# Patient Record
Sex: FEMALE | Race: WHITE | Employment: OTHER | URBAN - METROPOLITAN AREA
[De-identification: names, ages, dates, MRNs, and addresses within clinical notes are randomized per-mention and may not be internally consistent; named-entity substitution may affect disease eponyms.]

---

## 2017-06-19 PROBLEM — Z79.01 ON CONTINUOUS ORAL ANTICOAGULATION: Chronic | Status: ACTIVE | Noted: 2017-06-19

## 2017-06-19 PROBLEM — E55.9 HYPOVITAMINOSIS D: Status: ACTIVE | Noted: 2017-06-19

## 2017-06-19 PROBLEM — H81.10 BPPV (BENIGN PAROXYSMAL POSITIONAL VERTIGO): Chronic | Status: ACTIVE | Noted: 2017-06-19

## 2017-09-30 PROBLEM — J45.40 MODERATE PERSISTENT ASTHMA WITHOUT COMPLICATION: Chronic | Status: ACTIVE | Noted: 2017-09-30

## 2017-09-30 PROBLEM — Z91.09 ENVIRONMENTAL ALLERGIES: Chronic | Status: ACTIVE | Noted: 2017-09-30

## 2017-10-03 ENCOUNTER — HOSPITAL ENCOUNTER (OUTPATIENT)
Dept: MRI IMAGING | Age: 72
Discharge: HOME | End: 2017-10-03
Attending: PSYCHIATRY & NEUROLOGY

## 2017-10-03 DIAGNOSIS — R51.9 NONINTRACTABLE HEADACHE, UNSPECIFIED CHRONICITY PATTERN, UNSPECIFIED HEADACHE TYPE: ICD-10-CM

## 2017-10-03 NOTE — PROGRESS NOTES
Patient tried MRi. Unable to complete exam due to claustro. Patient has medication but could not take it because she did not have a .   She will reschedule for when she is able to take the medication

## 2017-12-09 PROBLEM — E66.9 OBESITY (BMI 30.0-34.9): Chronic | Status: ACTIVE | Noted: 2017-12-09

## 2017-12-26 ENCOUNTER — TELEPHONE (OUTPATIENT)
Dept: NUTRITION | Age: 72
End: 2017-12-26

## 2017-12-26 NOTE — TELEPHONE ENCOUNTER
Nutrition Counseling: Called pt and left voicemail with contact information regarding [facility]'s referral for nutrition counseling.     Jeovany Monet MS, [address] [place] [address], [address], [place]  W: [phone]  C: [phone]

## 2017-12-27 ENCOUNTER — TELEPHONE (OUTPATIENT)
Dept: NUTRITION | Age: 72
End: 2017-12-27

## 2017-12-27 NOTE — TELEPHONE ENCOUNTER
Nutrition Counseling: Pt referred by practitioner, Tolu Rivers. Spoke with pt regarding referral for nutrition counseling. Pt is interested and is advised that an insurance pre-cert request will be sent to registration on January 1st. Benefits information will be determined in 7-10 days after which pt will be contacted to attempt to schedule an appt.     Ridge Kearney MS, [address] [place] [address], [address] [place] [address] [place], [place]  W: [phone]  C: [phone]

## 2017-12-29 PROBLEM — F33.9 RECURRENT DEPRESSION (HCC): Status: ACTIVE | Noted: 2017-12-29

## 2018-01-09 ENCOUNTER — TELEPHONE (OUTPATIENT)
Dept: NUTRITION | Age: 73
End: 2018-01-09

## 2018-01-09 NOTE — TELEPHONE ENCOUNTER
Nutrition Counseling:  Pt referred by Dr. Luiz Mac's office. Spoke with pt regarding non-coverage by insurance. Gave self-pay rates and free group education class information. Pt states that she has recently lost her  and finds that she is coping some with the grief by eating more. Advised pt of the grief management class at the end of the month. Mailed class information to pt. Advised pt to call with questions and to f/u with MD regarding plans for any therapeutic counseling that may be needed.     Edouard Soria MS, [address] [place] [address], [address], [place]  W: [phone]  C: [phone]

## 2018-01-20 ENCOUNTER — HOSPITAL ENCOUNTER (OUTPATIENT)
Dept: MAMMOGRAPHY | Age: 73
Discharge: HOME | End: 2018-01-20
Attending: INTERNAL MEDICINE
Payer: COMMERCIAL

## 2018-01-20 DIAGNOSIS — Z12.31 VISIT FOR SCREENING MAMMOGRAM: ICD-10-CM

## 2018-01-20 PROCEDURE — 77067 SCR MAMMO BI INCL CAD: CPT

## 2018-07-06 ENCOUNTER — HOSPITAL ENCOUNTER (OUTPATIENT)
Dept: PHYSICAL THERAPY | Age: 73
Discharge: HOME | End: 2018-07-06
Payer: COMMERCIAL

## 2018-07-06 PROCEDURE — G8978 MOBILITY CURRENT STATUS: HCPCS

## 2018-07-06 PROCEDURE — G8979 MOBILITY GOAL STATUS: HCPCS

## 2018-07-06 PROCEDURE — 97110 THERAPEUTIC EXERCISES: CPT

## 2018-07-06 PROCEDURE — 97162 PT EVAL MOD COMPLEX 30 MIN: CPT

## 2018-07-06 NOTE — THERAPY EVALUATION
Mary Cordova  : 1945  Primary: Robin Linaresricardodaniela Of Venu Cardenas*  [place] [facility], [address], [facility], [address] [facility]  [phone]   [phone]         OUTPATIENT PHYSICAL THERAPY:Initial Assessment 2018    ICD-10: Treatment Diagnosis: Pain in right knee (M25.561); Pain in left knee (M25.562); Difficulty in walking, not elsewhere classified (R26.2)  Precautions/Allergies:   Betamethasone; Hydrochlorothiazide; Albuterol; Allegra-d 12 hour [fexofenadine-pseudoephedrine]; Bactrim [sulfamethoprim ds]; Ciprofloxacin; Claritin-d 12 hour [loratadine-pseudoephedrine]; Cortisone; Cymbalta [duloxetine]; Effexor [venlafaxine]; Lexapro [escitalopram]; Pristiq [desvenlafaxine]; Rozerem [ramelteon]; Zoloft [sertraline]; and Zyrtec-d [cetirizine-pseudoephedrine]   Fall Risk Score: 0 (? 5 = High Risk)  MD Orders: PT eval and treat MEDICAL/REFERRING DIAGNOSIS:  Unilateral primary osteoarthritis, right knee [M17.11]  Unilateral primary osteoarthritis, left knee [M17.12]   DATE OF ONSET: Progressive over the last several months   REFERRING PHYSICIAN: Jackeline Gerard MD  RETURN PHYSICIAN APPOINTMENT: As needed     ASSESSMENT (Date: 18):  Ms. Zell Meigs presents with B knee pain R>L due to progressive osteoarthritis. She has decreased B LE strength, decreased balance, reduced ambulation tolerance, and significant pain with mobility. According to her LEFS, she perceives great impairment to her quality of life. She requires skilled PT of strength/endurance training, balance training, and functional mobility training in order to address the goals and problems listed below. Discussed the option and benefits of aquatic therapy with the patient. She expressed interest in the idea, but stated that she was too busy with work at the moment. Will try and add to treatment plan as patient's work schedule frees up.  Patient is only able to attend therapy once a week due to having a $30 co-pay. PROBLEM LIST (Impacting functional limitations):  1. Decreased Strength  2. Decreased ADL/Functional Activities  3. Decreased Transfer Abilities  4. Decreased Ambulation Ability/Technique  5. Decreased Balance  6. Increased Pain  7. Decreased Activity Tolerance  8. Decreased [place] with Home Exercise Program INTERVENTIONS PLANNED:  1. Balance Exercise  2. Gait Training  3. Home Exercise Program (HEP)  4. Manual Therapy  5. Therapeutic Activites  6. Therapeutic Exercise/Strengthening  7. Aquatic Therapy   TREATMENT PLAN:  Effective Dates: 7/6/2018 TO 9/5/2018 (60 days). Frequency/Duration: 1 time a week for 60 Days  GOALS: (Goals have been discussed and agreed upon with patient.)  Discharge Goals: Time Frame: 60 days  1. Patient will be independent with HEP. 2. Patient will have an increase on the LEFS to 42/80 in order to demonstrate improved quality of life and reduced pain with activity. 3. Patient will demonstrate sit to stand transfer on first attempt without use of hands with <2/10 pain in B knees in order to demonstrate improved functional independence. Rehabilitation Potential For Stated Goals: Good  Regarding Daisy Chung's therapy, I certify that the treatment plan above will be carried out by a therapist or under their direction. Thank you for this referral,  PRASANTH Brown  Referring Physician Signature: Steven Watson MD              Date                    HISTORY:   Patient Stated Goal:        Patient states she wants to be able to get back to doing the things she was able to do before such as active walking for exercise. History of Present Injury/Illness (Reason for Referral):  Patient presents with B knee osteoarthritis. She reports that her L knee is \"bone on bone\", but she has had 5 rounds of gel shots in the L which has improved her pain. Her last shot was 2 weeks ago.  Her R knee pain is now worse than her L because she thinks that she has been compensating with it for so long. The plan is to eventually have a L TKA, but she would like to wait until next February because she is thinking about retiring from work at that point. Past Medical History/Comorbidities:   Ms. 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 (8/16/2011); Anemia (10/13/2010); Anticoagulant long-term use (6/8/2016); Anxiety and depression (6/23/2009); Atrial fibrillation ([facility] [address] Atrophic vaginitis (5/2/2012); B12 deficiency (11/17/2010); Blood glucose elevated (2/3/2009); Chest pain; Dysfunctional uterine bleeding (5/2/2012); Generalized anxiety disorder (10/21/2016); GERD (gastroesophageal reflux disease) (11/8/2012); High risk medication use (10/21/2016); HLD (hyperlipidemia) (2/3/2009); HTN (hypertension); Hypothyroidism (10/12/2009); IBS (irritable bowel syndrome) (7/5/2011); Insomnia (3/17/2010); Leukocytosis (1/15/2014); LVH (left ventricular hypertrophy) (1/13/2009); Microscopic hematuria (4/16/2014); Obesity; Osteopenia (1/15/2014); Pernicious anemia (1/15/2014); Pneumonia (12/30/2009); Positive occult stool blood test (10/13/2010); Skin lesion (8/16/2011); Thrombocytopenia ([facility] [address] (2/28/2013); Unspecified sleep apnea; Urinary frequency (3/28/2013); and Vertigo (12/9/2010). Ms. Getachew Givens  has a past surgical history that includes pr cardiac surg procedure unlist; hx colonoscopy (2010); us guided core breast biopsy (Right, 1966); and hx breast biopsy (1972). Social History/Living Environment:    Patient lives in a house with her . Prior Level of Function/Work/Activity:  Still works for the city of [place]. Previously independent with all mobility. Current Medications:    Current Outpatient Prescriptions:     fluticasone (FLONASE) 50 mcg/actuation nasal spray, 2 Sprays by Both Nostrils route daily. , Disp: 1 Bottle, Rfl: 5    flecainide (TAMBOCOR) 100 mg tablet, Take 200 mg by mouth., Disp: , Rfl:     zolpidem CR (AMBIEN CR) 6.25 mg tablet, Take 1 Tab by mouth nightly as needed for Sleep. Max Daily Amount: 6.25 mg., Disp: 30 Tab, Rfl: 5    potassium chloride (K-DUR, KLOR-CON) 20 mEq tablet, Take 1 Tab by mouth daily. , Disp: 90 Tab, Rfl: 3    omeprazole (PRILOSEC) 20 mg capsule, Take 1 Cap by mouth two (2) times a day., Disp: 180 Cap, Rfl: 3    cyanocobalamin (VITAMIN B-12) 1,000 mcg tablet, Take 1,000 mcg by mouth daily. , Disp: , Rfl:     OTHER, Please provide patient with custom orthotic and extra depth shoes  Dx.M20.11, M20.5x1, Disp: 1 Each, Rfl: 0    levothyroxine (SYNTHROID) 125 mcg tablet, Take 1 Tab by mouth Daily (before breakfast). , Disp: 90 Tab, Rfl: 3    dilTIAZem XR (DILACOR XR) 240 mg XR capsule, Take 1 Cap by mouth daily. Indications: hypertension, VENTRICULAR RATE CONTROL IN ATRIAL FIBRILLATION, Disp: 90 Cap, Rfl: 3    lisinopril (PRINIVIL, ZESTRIL) 20 mg tablet, Take 1 Tab by mouth two (2) times a day., Disp: 180 Tab, Rfl: 3    vitamin e 600 unit capsule, Take 400 Units by mouth daily. , Disp: , Rfl:     cholecalciferol (VITAMIN D3) 1,000 unit tablet, Take 1,000 Units by mouth daily. , Disp: , Rfl:     montelukast (SINGULAIR) 10 mg tablet, Take 1 Tab by mouth daily. , Disp: 90 Tab, Rfl: 3    rivaroxaban (XARELTO) 20 mg tab tablet, Take 1 Tab by mouth daily. , Disp: 90 Tab, Rfl: 3    furosemide (LASIX) 20 mg tablet, Furosemide 20 Mg, one PO daily, Disp: 90 Tab, Rfl: 3    CALCIUM CARBONATE/VITAMIN D3 (CALCIUM 500 + D, D3, PO), Take 1 Tab by mouth two (2) times a day., Disp: , Rfl:     albuterol (PROVENTIL HFA, VENTOLIN HFA, PROAIR HFA) 90 mcg/actuation inhaler, Take 2 Puffs by inhalation as needed. , Disp: , Rfl:     loperamide (IMMODIUM) 2 mg tablet, Take 2 mg by mouth four (4) times daily as needed for Diarrhea., Disp: , Rfl:     budesonide-formoterol (SYMBICORT) 160-4.5 mcg/actuation HFA inhaler, Take 2 Puffs by inhalation two (2) times a day., Disp: , Rfl:      Date Last Reviewed:  7/6/2018   Number of Personal Factors/Comorbidities that affect the Plan of Care: 1-2: MODERATE COMPLEXITY   EXAMINATION:   Observation/Orthostatic Postural Assessment:          Observed L foot arch collapse which placed her foot into excessive pronation during standing; genu valgus of L knee in standing. Patient has orthopedic shoes with custom insets made by [employer]. Mental Status:          Intact  Sensation:        Intact  Skin Integrity:           Intact   Balance:          Decreased dynamic balance. Lower Extremity: L LE AROM: 0-125    R LE AROM: 0-120   Strength PROM   Action R L R  L    Hip Flexion 4 4     Hip Extension       Hip Abduction 3+ 3+     Hip Adduction       Knee Flexion 5 5     Knee Extension 4 4     Dorsi Flexion 5 5     Plantar Flexion 5 5     Inversion       Eversion                   Functional Mobility:         Gait/Ambulation:  Ambulated with wide CATIA, L foot collapsed into eversion         Transfers:  Modified independent; occasionally required multiple attempts to stand. Bed Mobility:  Modified Independent        Stairs:  NT               Body Structures Involved:  1. Bones  2. Joints  3. Muscles Body Functions Affected:  1. Sensory/Pain  2. Neuromusculoskeletal  3. Movement Related Activities and Participation Affected:  1. Mobility  2. Domestic Life  3. Community, Social and [place]   Number of elements that affect the Plan of Care: 4+: HIGH COMPLEXITY   CLINICAL PRESENTATION:   Presentation: Evolving clinical presentation with changing clinical characteristics: MODERATE COMPLEXITY   CLINICAL DECISION MAKING:   Outcome Measure: Tool Used: Lower Extremity Functional Scale (LEFS)  Score:  Initial: 32/80 Most Recent: X/80 (Date: -- )   Interpretation of Score: 20 questions each scored on a 5 point scale with 0 representing \"extreme difficulty or unable to perform\" and 4 representing \"no difficulty\".   The lower the score, the greater the functional disability. 80/80 represents no disability. Minimal detectable change is 9 points. Score 80 79-63 62-48 47-32 31-16 15-1 0   Modifier CH CI CJ CK CL CM CN         Tool Used: Timed Up and Go (TUG)  Score:  Initial: 11.2 seconds Most Recent: X seconds (Date: -- )   Interpretation of Score: The test measures, in seconds, the time taken by an individual to stand up from a standard arm chair (seat height 46 cm [18 in], arm height 65 cm [25.6 in]), walk a distance of 3 meters (118 in, approx 10 ft), turn, walk back to the chair and sit down. If the individual takes longer than 14 seconds to complete TUG, this indicates risk for falls. Score 7 7.5-10.5 11-14 14.5-17.5 18-21 21.5-24.5 25+   Modifier CH CI CJ CK CL CM CN       Medical Necessity:   · Patient is expected to demonstrate progress in strength and balance to increase independence with functional mobility. · Patient demonstrates good rehab potential due to higher previous functional level. Reason for Services/Other Comments:  · Patient continues to require modification of therapeutic interventions to increase complexity of exercises. Use of outcome tool(s) and clinical judgement create a POC that gives a: Questionable prediction of patient's progress: MODERATE COMPLEXITY   TREATMENT:   (In addition to Assessment/Re-Assessment sessions the following treatments were rendered)  Pre Treatment Symptoms: See Assessment above    Evaluation: 0870-1956  THERAPEUTIC EXERCISE: (8 minutes):  Exercises per grid below to improve mobility, strength and balance. Required moderate verbal cues to promote proper body alignment and promote proper body mechanics. Progressed resistance, repetitions and complexity of movement as indicated.    Date:  7/6/18 Date:   Date:     Activity/Exercise Parameters Parameters Parameters   Supine bridges X 10 reps     Clamshells X 10 reps B                                         Treatment/Session Assessment:  See initial assessment above. · Pain/ Symptoms: Initial:   6/10 Post Session:  6/10 ·   Compliance with Program/Exercises: Will assess as treatment progresses. · Recommendations/Intent for next treatment session: \"Next visit will focus on advancements to more challenging activities\".   Total Treatment Duration:  PT Patient Time In/Time Out  Time In: 1115  Time Out: [address] [facility]

## 2018-07-07 NOTE — PROGRESS NOTES
Ambulatory/Rehab Services H2 Model Falls Risk Assessment    Risk Factor Pts. ·   Confusion/Disorientation/Impulsivity  []    4 ·   Symptomatic Depression  []   2 ·   Altered Elimination  []   1 ·   Dizziness/Vertigo  []   1 ·   Gender (Male)  []   1 ·   Any administered antiepileptics (anticonvulsants):  []   2 ·   Any administered benzodiazepines:  []   1 ·   Visual Impairment (specify):  []   1 ·   Portable Oxygen Use  []   1 ·   Orthostatic ? BP  []   1 ·   History of Recent Falls (within 3 mos.)  []   5     Ability to Rise from Chair (choose one) Pts. ·   Ability to rise in a single movement  []   0 ·   Pushes up, successful in one attempt  []   1 ·   Multiple attempts, but successful  []   3 ·   Unable to rise without assistance  []   4   Total: (5 or greater = High Risk) 0     Falls Prevention Plan:   []                Physical Limitations to Exercise (specify):   []                Mobility Assistance Device (type):   []                Exercise/Equipment Adaptation (specify):    ©2010 [facility] of [address] Patent #[id number].  Federal Law prohibits the replication, distribution or use without written permission from [facility] [employer]

## 2018-07-11 ENCOUNTER — HOSPITAL ENCOUNTER (OUTPATIENT)
Dept: PHYSICAL THERAPY | Age: 73
Discharge: HOME | End: 2018-07-11
Payer: COMMERCIAL

## 2018-07-16 ENCOUNTER — APPOINTMENT (OUTPATIENT)
Dept: PHYSICAL THERAPY | Age: 73
End: 2018-07-16
Payer: COMMERCIAL

## 2018-07-26 ENCOUNTER — HOSPITAL ENCOUNTER (OUTPATIENT)
Dept: PHYSICAL THERAPY | Age: 73
Discharge: HOME | End: 2018-07-26
Payer: COMMERCIAL

## 2018-07-26 NOTE — PROGRESS NOTES
Stephanie Santana  : 1945 [facility], [address], [address] [place] [address], [facility]  [phone]   [phone]          OUTPATIENT DAILY NOTE    NAME/AGE/GENDER: Stephanie Santana is a 67 y.o. female. DATE: 2018    Patient cancelled for appointment today due to getting an injection. Stated she would call to follow up. Will plan to follow up on next scheduled visit.     KAREL SalcidoT

## 2018-09-19 PROBLEM — Z79.899 ON POTASSIUM WASTING DIURETIC THERAPY: Chronic | Status: ACTIVE | Noted: 2018-09-19

## 2018-09-19 PROBLEM — E78.00 ELEVATED LDL CHOLESTEROL LEVEL: Chronic | Status: ACTIVE | Noted: 2018-09-19

## 2018-09-19 PROBLEM — R73.01 FASTING HYPERGLYCEMIA: Chronic | Status: ACTIVE | Noted: 2018-09-19

## 2019-01-16 PROBLEM — K58.2 IRRITABLE BOWEL SYNDROME WITH BOTH CONSTIPATION AND DIARRHEA: Status: ACTIVE | Noted: 2019-01-16

## 2019-04-13 ENCOUNTER — HOSPITAL ENCOUNTER (OUTPATIENT)
Dept: MAMMOGRAPHY | Age: 74
Discharge: HOME | End: 2019-04-13
Attending: INTERNAL MEDICINE
Payer: COMMERCIAL

## 2019-04-13 DIAGNOSIS — Z12.39 ENCOUNTER FOR SCREENING BREAST EXAMINATION: ICD-10-CM

## 2019-04-13 PROCEDURE — 77067 SCR MAMMO BI INCL CAD: CPT

## 2019-07-15 ENCOUNTER — HOSPITAL ENCOUNTER (OUTPATIENT)
Dept: SURGERY | Age: 74
Discharge: HOME | End: 2019-07-15
Payer: MEDICARE

## 2019-07-15 ENCOUNTER — HOSPITAL ENCOUNTER (OUTPATIENT)
Dept: PHYSICAL THERAPY | Age: 74
Discharge: HOME | End: 2019-07-15
Payer: MEDICARE

## 2019-07-15 LAB
ANION GAP SERPL CALC-SCNC: 10 MMOL/L (ref 7–16)
APPEARANCE UR: CLEAR
APTT PPP: 40.4 SEC (ref 24.7–39.8)
BACTERIA SPEC CULT: NORMAL
BASOPHILS # BLD: 0 K/UL (ref 0–0.2)
BASOPHILS NFR BLD: 0 % (ref 0–2)
BILIRUB UR QL: NEGATIVE
BUN SERPL-MCNC: 17 MG/DL (ref 8–23)
CALCIUM SERPL-MCNC: 9.3 MG/DL (ref 8.3–10.4)
CHLORIDE SERPL-SCNC: 103 MMOL/L (ref 98–107)
CO2 SERPL-SCNC: 27 MMOL/L (ref 21–32)
COLOR UR: YELLOW
CREAT SERPL-MCNC: 0.9 MG/DL (ref 0.6–1)
DIFFERENTIAL METHOD BLD: ABNORMAL
EOSINOPHIL # BLD: 0 K/UL (ref 0–0.8)
EOSINOPHIL NFR BLD: 0 % (ref 0.5–7.8)
ERYTHROCYTE [DISTWIDTH] IN BLOOD BY AUTOMATED COUNT: 14 % (ref 11.9–14.6)
GLUCOSE SERPL-MCNC: 114 MG/DL (ref 65–100)
GLUCOSE UR STRIP.AUTO-MCNC: NEGATIVE MG/DL
HCT VFR BLD AUTO: 39.8 % (ref 35.8–46.3)
HGB BLD-MCNC: 12.8 G/DL (ref 11.7–15.4)
HGB UR QL STRIP: NEGATIVE
IMM GRANULOCYTES # BLD AUTO: 0.1 K/UL (ref 0–0.5)
IMM GRANULOCYTES NFR BLD AUTO: 1 % (ref 0–5)
INR PPP: 1.4
KETONES UR QL STRIP.AUTO: NEGATIVE MG/DL
LEUKOCYTE ESTERASE UR QL STRIP.AUTO: NEGATIVE
LYMPHOCYTES # BLD: 2.8 K/UL (ref 0.5–4.6)
LYMPHOCYTES NFR BLD: 35 % (ref 13–44)
MCH RBC QN AUTO: 27.6 PG (ref 26.1–32.9)
MCHC RBC AUTO-ENTMCNC: 32.2 G/DL (ref 31.4–35)
MCV RBC AUTO: 86 FL (ref 79.6–97.8)
MONOCYTES # BLD: 2 K/UL (ref 0.1–1.3)
MONOCYTES NFR BLD: 25 % (ref 4–12)
NEUTS SEG # BLD: 3.2 K/UL (ref 1.7–8.2)
NEUTS SEG NFR BLD: 39 % (ref 43–78)
NITRITE UR QL STRIP.AUTO: NEGATIVE
NRBC # BLD: 0 K/UL (ref 0–0.2)
PH UR STRIP: 5 [PH] (ref 5–9)
PLATELET # BLD AUTO: 198 K/UL (ref 150–450)
PMV BLD AUTO: 11.1 FL (ref 9.4–12.3)
POTASSIUM SERPL-SCNC: 3.8 MMOL/L (ref 3.5–5.1)
PROT UR STRIP-MCNC: NEGATIVE MG/DL
PROTHROMBIN TIME: 16.9 SEC (ref 11.7–14.5)
RBC # BLD AUTO: 4.63 M/UL (ref 4.05–5.2)
SERVICE CMNT-IMP: NORMAL
SODIUM SERPL-SCNC: 140 MMOL/L (ref 136–145)
SP GR UR REFRACTOMETRY: 1.01 (ref 1–1.02)
UROBILINOGEN UR QL STRIP.AUTO: 0.2 EU/DL (ref 0.2–1)
WBC # BLD AUTO: 8 K/UL (ref 4.3–11.1)

## 2019-07-15 PROCEDURE — 81003 URINALYSIS AUTO W/O SCOPE: CPT

## 2019-07-15 PROCEDURE — 85610 PROTHROMBIN TIME: CPT

## 2019-07-15 PROCEDURE — 85025 COMPLETE CBC W/AUTO DIFF WBC: CPT

## 2019-07-15 PROCEDURE — 85730 THROMBOPLASTIN TIME PARTIAL: CPT

## 2019-07-15 PROCEDURE — 87641 MR-STAPH DNA AMP PROBE: CPT

## 2019-07-15 PROCEDURE — 77030027138 HC INCENT SPIROMETER -A

## 2019-07-15 PROCEDURE — 97161 PT EVAL LOW COMPLEX 20 MIN: CPT

## 2019-07-15 PROCEDURE — 80048 BASIC METABOLIC PNL TOTAL CA: CPT

## 2019-07-15 RX ORDER — ALBUTEROL SULFATE 90 UG/1
1 AEROSOL, METERED RESPIRATORY (INHALATION)
COMMUNITY

## 2019-07-15 NOTE — PERIOP NOTES
Patient verified name and . Order for consent found in EHR and matches case posting; patient verified. left total knee arthroplasty    Type 3 surgery, PAT Joint assessment complete. Labs per surgeon: cbc, bmp, UA, PT, PTT, MRSA nasal swab; results pending. Labs per anesthesia protocol: no additional lab work needed. EKG:Done 19 at [facility] cardiology. EKG, stress test, last heart cath report and last Echo requested via fax to [facility]. Per last cardiology office note dated 19- recommended that pt have a stress test due to chest pain. Pt states that she does still occassionally have chest pains and SOB but has not had stress test done per recommendations. Will have anesthesia review chart. MRSA/MSSA swab collected; pharmacy to review and dose antibiotic as appropriate. Ade Hex and instructions to return bottle on DOS given per hospital policy. Patient provided with handouts including Guide to Surgery, Pain Management, Hand Hygiene, Blood Transfusion Education, and [facility] Brochure. Patient answered medical/surgical history questions at their best of ability. All prior to admission medications documented in [facility]. Original medication prescription bottle was visualized during patient appointment. Patient instructed to hold all vitamins 7 days prior to surgery and NSAIDS 5 days prior to surgery. Prescription medications to hold: vitamins, supplements and xarelto. Left voicemail on nurse line at Dr. Madalyn Khan office requesting a 3 day hold on xarelto; charge nurse to follow up. Patient instructed to continue previous medications as prescribed prior to surgery and to take the following medications the day of surgery according to anesthesia guidelines with a small sip of water: albuterol inahler, symbicort inhaler, diltiazem, levothyroxine and omper. Patient teach back successful and patient demonstrates knowledge of instruction.

## 2019-07-15 NOTE — PERIOP NOTES
Note in EMR from [facility] with OK to hold Xarelto x 3 days prior to surgery. \"Telephone Encounter - Danny Todd RN - 07/15/2019 3:26 PM EDT  [facility]     Electronically signed by Nurys Nava RN at 07/15/2019 3:27 PM EDT    Back to top of Miscellaneous Notes  Telephone Encounter - Leonela Werner NP - 07/15/2019 2:33 PM EDT  Ok to [facility]   Electronically signed by Adalberto Manning NP at 07/15/2019 2:33 PM EDT    Back to top of Miscellaneous Notes  Telephone Encounter - Danny Todd RN - 07/15/2019 2:29 PM EDT  Received a call from Paty Oro ([phone]) with [place] requesting a 3 day hold on xarelto for a schedules knee replacement. \"

## 2019-07-15 NOTE — PERIOP NOTES
PT/PTT elevated; will have anesthesia review. All other lab results within anesthesia guidelines. Lab results sent to PCP per surgeon's request.       Recent Results (from the past 12 hour(s))   CBC WITH AUTOMATED DIFF    Collection Time: 07/15/19 11:25 AM   Result Value Ref Range    WBC 8.0 4.3 - 11.1 K/uL    RBC 4.63 4.05 - 5.2 M/uL    HGB 12.8 11.7 - 15.4 g/dL    HCT 39.8 35.8 - 46.3 %    MCV 86.0 79.6 - 97.8 FL    MCH 27.6 26.1 - 32.9 PG    MCHC 32.2 31.4 - 35.0 g/dL    RDW 14.0 11.9 - 14.6 %    PLATELET 670 033 - 381 K/uL    MPV 11.1 9.4 - 12.3 FL    ABSOLUTE NRBC 0.00 0.0 - 0.2 K/uL    DF AUTOMATED      NEUTROPHILS 39 (L) 43 - 78 %    LYMPHOCYTES 35 13 - 44 %    MONOCYTES 25 (H) 4.0 - 12.0 %    EOSINOPHILS 0 (L) 0.5 - 7.8 %    BASOPHILS 0 0.0 - 2.0 %    IMMATURE GRANULOCYTES 1 0.0 - 5.0 %    ABS. NEUTROPHILS 3.2 1.7 - 8.2 K/UL    ABS. LYMPHOCYTES 2.8 0.5 - 4.6 K/UL    ABS. MONOCYTES 2.0 (H) 0.1 - 1.3 K/UL    ABS. EOSINOPHILS 0.0 0.0 - 0.8 K/UL    ABS. BASOPHILS 0.0 0.0 - 0.2 K/UL    ABS. IMM.  GRANS. 0.1 0.0 - 0.5 K/UL   METABOLIC PANEL, BASIC    Collection Time: 07/15/19 11:25 AM   Result Value Ref Range    Sodium 140 136 - 145 mmol/L    Potassium 3.8 3.5 - 5.1 mmol/L    Chloride 103 98 - 107 mmol/L    CO2 27 21 - 32 mmol/L    Anion gap 10 7 - 16 mmol/L    Glucose 114 (H) 65 - 100 mg/dL    BUN 17 8 - 23 MG/DL    Creatinine 0.90 0.6 - 1.0 MG/DL    GFR est AA >60 >60 ml/min/1.73m2    GFR est non-AA >60 >60 ml/min/1.73m2    Calcium 9.3 8.3 - 10.4 MG/DL   PROTHROMBIN TIME + INR    Collection Time: 07/15/19 11:25 AM   Result Value Ref Range    Prothrombin time 16.9 (H) 11.7 - 14.5 sec    INR 1.4     PTT    Collection Time: 07/15/19 11:25 AM   Result Value Ref Range    aPTT 40.4 (H) 24.7 - 39.8 SEC   URINALYSIS W/ RFLX MICROSCOPIC    Collection Time: 07/15/19 11:30 AM   Result Value Ref Range    Color YELLOW      Appearance CLEAR      Specific gravity 1.008 1.001 - 1.023      pH (UA) 5.0 5.0 - 9.0      Protein NEGATIVE  NEG mg/dL    Glucose NEGATIVE  mg/dL    Ketone NEGATIVE  NEG mg/dL    Bilirubin NEGATIVE  NEG      Blood NEGATIVE  NEG      Urobilinogen 0.2 0.2 - 1.0 EU/dL    Nitrites NEGATIVE  NEG      Leukocyte Esterase NEGATIVE  NEG     MSSA/MRSA SC BY PCR, NASAL SWAB    Collection Time: 07/15/19  1:02 PM   Result Value Ref Range    Special Requests: NASAL      Culture result:        SA target not detected. A MRSA NEGATIVE, SA NEGATIVE test result does not preclude MRSA or SA nasal colonization.

## 2019-07-15 NOTE — PROGRESS NOTES
Angelita Bonilla  : [birth year](1 y.o.) [address] at [address], [place] [address].  [phone]       Physical Therapy Prehab Plan of Treatment and Evaluation Summary:7/15/2019    ICD-10: Treatment Diagnosis:   · Pain in Left Knee (M25.562)  · Stiffness of Left Knee, Not elsewhere classified (M25.662)  · Difficulty in walking, Not elsewhere classified (R26.2)  Precautions/Allergies:   Betamethasone; Hydrochlorothiazide; Albuterol; Allegra-d 12 hour [fexofenadine-pseudoephedrine]; Bactrim [sulfamethoprim ds]; Ciprofloxacin; Claritin-d 12 hour [loratadine-pseudoephedrine]; Cortisone; Cymbalta [duloxetine]; Effexor [venlafaxine]; Lexapro [escitalopram]; Pristiq [desvenlafaxine]; Rozerem [ramelteon]; Zoloft [sertraline]; and Zyrtec-d [cetirizine-pseudoephedrine]  MEDICAL/REFERRING DIAGNOSIS:  Unilateral primary osteoarthritis, left knee [M17.12]  REFERRING PHYSICIAN: Neymar Mendenhall MD  DATE OF SURGERY: 19    Assessment:   Comments:  Pt. Lives alone and plans to go home with support from her daughter who is here today. Pt. Is in a w/c for prehab due to limited ambulation tolerance. She was able to take some steps with hand held assist.  She reports she walks in her home using furniture for balance. She needs a right tka as well and also reports collapsed arches in her feet. She expressed some interest in rehab with MD but he wanted her to go home. Her daughter can be with her about a week and then we discussed getting some friends to assist her when her daughter has to go back to work. PROBLEM LIST (Impacting functional limitations):  Ms. Riaz Diop presents with the following left lower extremity(s) problems:  1. Strength  2. Range of Motion  3. Home Exercise Program  4. Pain   INTERVENTIONS PLANNED:  1. Home Exercise Program  2. Educational Discussion      TREATMENT PLAN: Effective Dates: 7/15/2019 TO 7/15/2019.   Frequency/Duration: Patient to continue to perform home exercise program at least twice per day up until her surgery. GOALS: (Goals have been discussed and agreed upon with patient.)  Discharge Goals: Time Frame: 1 Day  1. Patient will demonstrate independence with a home exercise program designed to increase strength, range of motion and pain control to minimize functional deficits and optimize patient for total joint replacement. Rehabilitation Potential For Stated Goals: Good  Regarding Angelita Ortega therapy, I certify that the treatment plan above will be carried out by a therapist or under their direction.   Thank you for this referral,  Rosalba Bird, PT               HISTORY:   Present Symptoms:  Pain Intensity 1: (3 at worst)  Pain Location 1: Knee   History of Present Injury/Illness (Reason for Referral):  Medical/Referring Diagnosis: Unilateral primary osteoarthritis, left knee [M17.12]   Past Medical History/Comorbidities:   Ms. Saba Thompson  has a past medical history of Acquired stenosis of external ear canal unspecified as to cause (8/16/2011), Anemia (10/13/2010), Anticoagulant long-term use (6/8/2016), Anxiety and depression (6/23/2009), Atrial fibrillation ([facility] [address] Atrophic vaginitis (5/2/2012), B12 deficiency (11/17/2010), Blood glucose elevated (2/3/2009), Chest pain, Dysfunctional uterine bleeding (5/2/2012), Generalized anxiety disorder (10/21/2016), GERD (gastroesophageal reflux disease) (11/08/2012), High risk medication use (10/21/2016), HLD (hyperlipidemia) (2/3/2009), HTN (hypertension), Hypothyroidism (10/12/2009), IBS (irritable bowel syndrome) (7/5/2011), Insomnia (3/17/2010), Leukocytosis (1/15/2014), LVH (left ventricular hypertrophy) (1/13/2009), Microscopic hematuria (4/16/2014), Obesity, Osteopenia (1/15/2014), Pernicious anemia (1/15/2014), Pneumonia (12/30/2009), Positive occult stool blood test (10/13/2010), Skin lesion (8/16/2011), Thrombocytopenia ([address] (2/28/2013), Unspecified sleep apnea, Urinary frequency (3/28/2013), and Vertigo (12/9/2010). Ms. Shani Coffman  has a past surgical history that includes pr cardiac surg procedure unlist; hx colonoscopy (2010); us guided core breast biopsy (Right, 1966); and hx breast biopsy (1972).   Social History/Living Environment:   Home Environment: Private residence  # Steps to Enter: 4  Rails to Enter: Yes  Hand Rails : Right  One/Two Story Residence: One story  Living Alone: Yes  Support Systems: Child(dani)  Patient Expects to be Discharged to[de-identified] Private residence  Current DME Used/Available at Home: [address] [facility], [place], straight, Shower chair  Tub or Shower Type: Shower  Work/Activity:  retired  Dominant Side:  RIGHT  Current Medications:  See [address] note   Number of Personal Factors/Comorbidities that affect the Plan of Care: 3+: HIGH COMPLEXITY   EXAMINATION:   ADLs (Current Functional Status):   Ambulation:  [x] Independent  [x] Walk Indoors Only  [] Walk Outdoors  [] Use Assistive Device  [] Use Wheelchair Only Dressing:  [x] [address] from Someone for:  [] Sock/Shoes  [] Pants  [] Everything   Bathing/Showering:   [x] Independent  [] Requires Assistance from Someone  [] [address]:  [] Routine house and yard work  [x] Light Housework Only  [] None   Observation/Orthostatic Postural Assessment:   Exceptions to WDLRounded shoulders, Pronated foot right, Pronated foot left, Genu valgus left, Genu valgus right  ROM/Flexibility:   Gross Assessment: Yes  AROM: Generally decreased, functional(right LE, right knee limited)                LLE Assessment  LLE Assessment (WDL): Exception to WDL  LLE AROM  L Knee Flexion: 103  L Knee Extension: 13          Strength:   Gross Assessment: Yes  Strength: Generally decreased, functional(right LE)       LLE Strength  L Knee Flexion: 3  L Knee Extension: 3          Functional Mobility:    Gross Assessment: Yes    Gait Description (WDL): Exceptions to WDL  Stand to Sit: Modified independent, Additional time  Sit to Stand: Modified independent, Additional time(using UE's a lot)  Distance (ft): 10 Feet (ft)  Ambulation - Level of Assistance: Contact guard assistance;Minimal assistance  Assistive Device: Other (comment)(hand held assist)  Speed/Krista: Delayed  Step Length: Left shortened;Right shortened  Gait Abnormalities: Decreased step clearance; Antalgic          Balance:    Sitting: Intact  Standing: With support   Body Structures Involved:  1. Bones  2. Joints  3. Muscles  4. Ligaments Body Functions Affected:  1. Movement Related Activities and Participation Affected:  1. Mobility   Number of elements that affect the Plan of Care: 3: MODERATE COMPLEXITY   CLINICAL PRESENTATION:   Presentation: Stable and uncomplicated: LOW COMPLEXITY   CLINICAL DECISION MAKING:   Outcome Measure: Tool Used: Lower Extremity Functional Scale (LEFS)  Score:  Initial: 20/80 Most Recent: X/80 (Date: -- )   Interpretation of Score: 20 questions each scored on a 5 point scale with 0 representing \"extreme difficulty or unable to perform\" and 4 representing \"no difficulty\". The lower the score, the greater the functional disability. 80/80 represents no disability. Minimal detectable change is 9 points. Medical Necessity:   · Ms. Facundo Dotson is expected to optimize her lower extremity strength and ROM in preparation for joint replacement surgery. Reason for Services/Other Comments:  · Achieve baseline assesment of musculoskeletal system, functional mobility and home environment. , educate in PT HEP in preparation for surgery, educate in hospital plan of care. Use of outcome tool(s) and clinical judgement create a POC that gives a: Clear prediction of patient's progress: LOW COMPLEXITY   TREATMENT:   Treatment/Session Assessment:  Patient was instructed in PT- HEP to increase strength and ROM in LEs. Answered all questions.   · Post session pain:  Knee pain  · Compliance with Program/Exercises: compliant most of the time.   Total Treatment Duration:  PT Patient Time In/Time Out  Time In: 1100  Time Out: 10 Praveen Jaramillo, PT

## 2019-07-16 VITALS
RESPIRATION RATE: 16 BRPM | SYSTOLIC BLOOD PRESSURE: 139 MMHG | WEIGHT: 216 LBS | OXYGEN SATURATION: 99 % | HEART RATE: 74 BPM | DIASTOLIC BLOOD PRESSURE: 61 MMHG | TEMPERATURE: 96.4 F | BODY MASS INDEX: 32.74 KG/M2 | HEIGHT: 68 IN

## 2019-07-16 NOTE — PERIOP NOTES
Dr. Mariola Alcala, anesthesia, reviewed chart including PT/PTT from today,  card note (4/22/19)), ekg (4/22/19), cath (10/16/17) and stress (10/6/17). Aware pt has not had stress that was recommended at 4/22/19 card appt and pt c/o of occasional CP and SOB. Per Dr. Mariola Alcala, pt to have stress before proceeding with surgery. Detailed message left for Ian Jade @ Dr. Alivia Serna office with above.

## 2019-07-16 NOTE — PROGRESS NOTES
07/15/19 1200   Oxygen Therapy   O2 Sat (%) 97 %   Pulse via Oximetry 75 beats per minute   O2 Device Room air   Pre-Treatment   Cough Non-productive   Breath Sounds Bilateral Diminished   Pre FEV1 (liters) 1.4 liters   % Predicted 56   Post-Treatment   Cough Non-productive   Post FEV1 (liters) 1.5 liters   % Predicted 60   Procedures   Delivery Source MDI   Aerosolized Medications Albuterol  (PT'S HOME MDI)   Incentive Spirometry Treatment   Actual Volume (ml) 1200 ml     Initial respiratory Assessment completed with pt. Pt was interviewed and evaluated in Joint camp prior to surgery. Patient ID:  Simon Iniguez  [id number]  1 y.o.  1945  Surgeon: Dr. Bev Cervantes  Date of Surgery: The linked surgery was not found. Please check manually. Procedure: Total Left Knee Arthroplasty  Primary Care Physician: Dano Jaquez [phone]  Specialists: Madan Cash- PULMONARY AT [address] [place] [address] 1.5 YEARS                                 Pt instructed in the use of Incentive Spirometry. Pt instructed to bring Incentive Spirometer back on date of surgery & to start using Is upon return to pt room.     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 Pt denies CP currrently.  Dysfunctional uterine bleeding 5/2/2012    Generalized anxiety disorder 10/21/2016    No meds     GERD (gastroesophageal reflux disease) 11/08/2012    managed with meds     High risk medication use 10/21/2016    HLD (hyperlipidemia) 2/3/2009    HTN (hypertension)     Normal renal US and 24hr urine 1/2016, Managed with meds     Hypothyroidism 10/12/2009    IBS (irritable bowel syndrome) 7/5/2011    Insomnia 3/17/2010    Leaky heart valve     Found on a Echo     Leukocytosis 1/15/2014    LVH (left ventricular hypertrophy) 1/13/2009    Microscopic hematuria 4/16/2014    Obesity     Osteopenia 1/15/2014    Pernicious anemia 1/15/2014    Pneumonia 12/30/2009    Positive occult stool blood test 10/13/2010    Skin lesion 8/16/2011    Thrombocytopenia ([address] 2/28/2013    Unspecified sleep apnea     Does not wear a c-pap     Urinary frequency 3/28/2013    Vertigo 12/9/2010                                            ASTHMA-                                                                                                         Respiratory history:DENIES SOB                                                                  Respiratory meds:  SYMBICORT BID, PROAIR PRN                                       FAMILY PRESENT:                                                        NO                                        PAST SLEEP STUDY:        YES                   HX OF PAOLO:                        YES                                                       PAOLO assessment:                                                        PHYSICAL EXAM   Body mass index is 32.84 kg/m².    Visit Vitals  /61 (BP 1 Location: Right arm, BP Patient Position: At rest;Sitting)   Pulse 74   Temp 96.4 °F (35.8 °C)   Resp 16   Ht 5' 8\" (1.727 m)   Wt 98 kg (216 lb)   SpO2 99%   BMI 32.84 kg/m²     Neck circumference:36      cm    Loud snoring: YES                                 Witnessed apnea or wakening gasping or choking:,                                                                                                          APNEA    Awakens with headaches:                                                  DENIES    Morning or daytime tiredness/ sleepiness:                                                                                                    TIRED   Dry mouth or sore throat in morning:                YES                                                                       Elder stage:  3    SACS score:5      Stop Bang   STOP-BANG  Does the patient snore loudly (louder than talking or loud enough to be heard through closed doors)?: Yes  Does the patient often feel tired, fatigued, or sleepy during the daytime, even after a \"good\" night's sleep?: Yes  Has anyone ever observed the patient stop breathing during their sleep? : Yes  Does the patient have or are they being treated for high blood pressure?: No  Is the patient's BMI greater than 35?: No  Is your neck circumference greater than 17 inches (Male) or 16 inches (Female)?: No  Is the patient older than 48?: Yes  Is the patient male?: No  PAOLO Score: 4  Has the patient been referred to Sleep Medicine?: No  Has the patient previously been diagnosed with Obstructive Sleep Apnea?: No                            CPAP:                       NONE                                        ALBUTEROL NEB Q6 PRN           O2 AT 3 HS       CONT SAT HS            Referrals:    Pt. Phone Number:

## 2019-08-01 PROBLEM — Z91.14 NONCOMPLIANCE WITH CPAP TREATMENT: Status: ACTIVE | Noted: 2019-08-01

## 2019-08-01 NOTE — ADVANCED PRACTICE NURSE
Total Joint Surgery Preoperative Chart Review      Patient ID:  Anni Cantor  [id number]  56 y.o.  1945  Surgeon: Dr. Haja Combs  Date of Surgery: The linked surgery was not found. Please check manually. Procedure: Total Left Knee Arthroplasty  Primary Care Physician: Melvena Boxer, [phone]  Specialty Physician(s):      Subjective:   Fransico Rodriguez is a 68 y.o. [place]ITE [place]  female who presents for preoperative evaluation for Total Left Knee arthroplasty. This is a preoperative chart review note based on data collected by the nurse at the surgical Pre-Assessment visit. Past Medical History:   Diagnosis Date    Acquired stenosis of external ear canal unspecified as to cause 8/16/2011    Anemia 10/13/2010    Anticoagulant long-term use 6/8/2016    Anxiety and depression 6/23/2009    Arthritis     Asthma     Symbicort daily and pro-air as needed, No hospitalizations     Atrial fibrillation ([address]     Diag 2012; AF despite flecainide and then amiodaone; AF ablation-2017    Atrophic vaginitis 5/2/2012    B12 deficiency 11/17/2010    Blood glucose elevated 2/3/2009    Chest pain     11/2008 stress nuclear: normal: 12/2011 stress nuclear: normal; 12/2014 cardiac CT: no significant calcium or obstructive CAD. Pt denies CP currrently.      Dysfunctional uterine bleeding 5/2/2012    Generalized anxiety disorder 10/21/2016    No meds     GERD (gastroesophageal reflux disease) 11/08/2012    managed with meds     High risk medication use 10/21/2016    HLD (hyperlipidemia) 2/3/2009    HTN (hypertension)     Normal renal US and 24hr urine 1/2016, Managed with meds     Hypothyroidism 10/12/2009    IBS (irritable bowel syndrome) 7/5/2011    Insomnia 3/17/2010    Leaky heart valve     Found on a Echo     Leukocytosis 1/15/2014    LVH (left ventricular hypertrophy) 1/13/2009    Microscopic hematuria 4/16/2014    Obesity     Osteopenia 1/15/2014    Pernicious anemia 1/15/2014    Pneumonia 12/30/2009    Positive occult stool blood test 10/13/2010    Skin lesion 8/16/2011    Thrombocytopenia ([address] 2/28/2013    Unspecified sleep apnea     Does not wear a c-pap     Urinary frequency 3/28/2013    Vertigo 12/9/2010      Past Surgical History:   Procedure Laterality Date    CARDIAC SURG PROCEDURE UNLIST      ablation    HX COLONOSCOPY  2010    polyps    US GUIDED CORE BREAST BIOPSY Right 1966     Family History   Problem Relation Age of Onset    Diabetes Mother     Hypertension Mother     Asthma Father     Diabetes Brother     Breast Cancer Neg Hx       Social History     Tobacco Use    Smoking status: Never Smoker    Smokeless tobacco: Never Used   Substance Use Topics    Alcohol use: No       Prior to Admission medications    Medication Sig Start Date End Date Taking? Authorizing Provider   albuterol (PROAIR HFA) 90 mcg/actuation inhaler Take 1 Puff by inhalation every four (4) hours as needed for Wheezing. Yes Provider, Historical   potassium chloride (K-DUR, KLOR-CON) 20 mEq tablet Take 1 Tab by mouth daily. 4/30/19  Yes Lissy Pascual MD   levothyroxine (SYNTHROID) 112 mcg tablet Take 1 Tab by mouth Daily (before breakfast). 4/30/19  Yes Lissy Pascual MD   lisinopril (PRINIVIL, ZESTRIL) 20 mg tablet Take 1 Tab by mouth two (2) times a day. Patient taking differently: Take 20 mg by mouth daily. 1/16/19  Yes Lissy Pascual MD   dilTIAZem XR (DILACOR XR) 240 mg XR capsule Take 1 Cap by mouth daily. 1/16/19  Yes Lissy Pascual MD   omeprazole (PRILOSEC) 20 mg capsule Take 1 Cap by mouth two (2) times a day. Patient taking differently: Take 20 mg by mouth daily. 1/16/19  Yes Lissy Pascual MD   furosemide (LASIX) 20 mg tablet Furosemide 20 Mg, one PO daily 9/18/18  Yes Lissy Pascual MD   montelukast (SINGULAIR) 10 mg tablet Take 1 Tab by mouth daily. Patient taking differently: Take 10 mg by mouth nightly.  9/18/18  Yes Gato Carroll MD CHRISTINA   cholecalciferol, vitamin D3, 5,000 unit TbDi Take 5,000 Units by mouth daily. Indications: VITAMIN D DEFICIENCY (HIGH DOSE THERAPY) 9/18/18  Yes Narayan Leiva MD   rivaroxaban (XARELTO) 20 mg tab tablet Take 1 Tab by mouth daily. Patient taking differently: Take 20 mg by mouth nightly. 8/8/18  Yes Narayan Leiva MD   flecainide (TAMBOCOR) 100 mg tablet Take 200 mg by mouth daily as needed. 3/12/18  Yes Provider, Historical   cyanocobalamin (VITAMIN B-12) 1,000 mcg tablet Take 1,000 mcg by mouth daily. Yes Provider, Historical   OTHER Please provide patient with custom orthotic and extra depth shoes  Dx.M20.11, M20.5x1 2/19/18  Yes Narayan Leiva MD   vitamin e 600 unit capsule Take 400 Units by mouth daily. Yes Provider, Historical   loperamide (IMMODIUM) 2 mg tablet Take 2 mg by mouth four (4) times daily as needed for Diarrhea. Yes Provider, Historical   budesonide-formoterol (SYMBICORT) 160-4.5 mcg/actuation HFA inhaler Take 2 Puffs by inhalation two (2) times a day. Yes Provider, Historical   zolpidem (AMBIEN) 5 mg tablet Take 1 Tab by mouth nightly as needed for Sleep.  Max Daily Amount: 5 mg. 7/19/19   Narayan Leiva MD     Allergies   Allergen Reactions    Betamethasone Anxiety     Jittery  Jittery    Hydrochlorothiazide Other (comments)     Interaction with Tikosyn    Albuterol Other (comments)     tachycardia/palpitations    Allegra-D 12 Hour [Fexofenadine-Pseudoephedrine] Other (comments)    Bactrim [Sulfamethoprim Ds] Other (comments)     Interaction with Tikosyn    Ciprofloxacin Other (comments)     Interaction with Tikosyn    Claritin-D 12 Hour [Loratadine-Pseudoephedrine] Other (comments)     Interaction with Tikosyn    Cortisone Other (comments)     Extreme redness to face    Cymbalta [Duloxetine] Other (comments)     FOGGY    Effexor [Venlafaxine] Nausea Only    Lexapro [Escitalopram] Nausea Only    Pristiq [Desvenlafaxine] Other (comments)     Affected her mental abilities    Rozerem [Ramelteon] Other (comments)     SPACEY      Zoloft [Sertraline] Shortness of Breath    Zyrtec-D [Cetirizine-Pseudoephedrine] Other (comments)     Interaction with Tikosyn          Objective:     Physical Exam:   No data found. ECG:    EKG Results     None          Data Review:   Labs:   Results for Mahnaz Ricci (MRN [id number]) as of 8/1/2019 15:17   Ref. Range 7/15/2019 11:25   Sodium Latest Ref Range: 136 - 145 mmol/L 140   Potassium Latest Ref Range: 3.5 - 5.1 mmol/L 3.8   Chloride Latest Ref Range: 98 - 107 mmol/L 103   CO2 Latest Ref Range: 21 - 32 mmol/L 27   Anion gap Latest Ref Range: 7 - 16 mmol/L 10   Glucose Latest Ref Range: 65 - 100 mg/dL 114 (H)   BUN Latest Ref Range: 8 - 23 MG/DL 17   Creatinine Latest Ref Range: 0.6 - 1.0 MG/DL 0.90   Calcium Latest Ref Range: 8.3 - 10.4 MG/DL 9.3   GFR est non-AA Latest Ref Range: >60 ml/min/1.73m2 >60   GFR est AA Latest Ref Range: >60 ml/min/1.73m2 >60   Results for Mahnaz Ricci (MRN [id number]) as of 8/1/2019 15:17   Ref.  Range 1/7/2019 07:36   Hemoglobin A1c, (calculated) Latest Ref Range: 4.8 - 5.6 % 5.4       Problem List:  )  Patient Active Problem List   Diagnosis Code    Microhematuria R31.29    Snoring R06.83    Atrial fibrillation (HCC) I48.91    Hypertension I10    Gastroesophageal reflux disease K21.9    Persistent disorder of initiating or maintaining sleep G47.00    Hypothyroidism E03.9    Bifascicular block I45.2    HLD (hyperlipidemia) E78.5    LVH (left ventricular hypertrophy) I51.7    Osteopenia M85.80    MR (mitral regurgitation) I34.0    Depression F32.9    GERD (gastroesophageal reflux disease) K21.9    TR (tricuspid regurgitation) I07.1    B12 deficiency E53.8    Asthma J45.909    Obstructive sleep apnea syndrome G47.33    Chest pain, unspecified R07.9    Palpitations R00.2    Syncope and collapse R55    Obesity E66.9    Anticoagulant long-term use Z79.01    Essential hypertension I10    High risk medication use Z79.899    Non morbid obesity with alveolar hypoventilation [id number]    Generalized anxiety disorder F41.1    On continuous oral anticoagulation Z79.01    BPPV (benign paroxysmal positional vertigo) H81.10    Hypovitaminosis D E55.9    Moderate persistent asthma without complication E73.36    Environmental allergies Z91.09    Obesity (BMI 30.0-34. 9) E66.9    Recurrent depression (HCC) F33.9    Fasting hyperglycemia R73.01    On potassium wasting diuretic therapy Z79.899    Elevated LDL cholesterol level E78.00    Irritable bowel syndrome with both constipation and diarrhea K58.2    Noncompliance with CPAP treatment Z91.14       Total Joint Surgery Pre-Assessment Recommendations: The patient is not compliant with wearing CPAP. Recommend oxygen saturation monitoring during hospitalization and oxygen at 3 liter via nc qhs. PEP therapy BID    Albuterol every 6 hours as need during hospitalization.        Signed By: GINGER Francois    August 1, 2019

## 2019-08-05 NOTE — PERIOP NOTES
Upon review of chart, surgery rescheduled for 10/22/19- original surgery dated 8/8/19. Chart placed in file cabinet for future assessment/surgery.

## 2019-10-09 ENCOUNTER — HOSPITAL ENCOUNTER (OUTPATIENT)
Dept: SURGERY | Age: 74
Discharge: HOME | End: 2019-10-09
Attending: ORTHOPAEDIC SURGERY
Payer: MEDICARE

## 2019-10-09 VITALS
HEIGHT: 68 IN | HEART RATE: 76 BPM | TEMPERATURE: 97.3 F | DIASTOLIC BLOOD PRESSURE: 72 MMHG | RESPIRATION RATE: 12 BRPM | SYSTOLIC BLOOD PRESSURE: 135 MMHG | BODY MASS INDEX: 34 KG/M2 | OXYGEN SATURATION: 97 % | WEIGHT: 224.31 LBS

## 2019-10-09 LAB
ATRIAL RATE: 74 BPM
BACTERIA SPEC CULT: NORMAL
CALCULATED P AXIS, ECG09: 91 DEGREES
CALCULATED R AXIS, ECG10: -48 DEGREES
CALCULATED T AXIS, ECG11: 42 DEGREES
DIAGNOSIS, 93000: NORMAL
P-R INTERVAL, ECG05: 142 MS
Q-T INTERVAL, ECG07: 394 MS
QRS DURATION, ECG06: 94 MS
QTC CALCULATION (BEZET), ECG08: 437 MS
SERVICE CMNT-IMP: NORMAL
VENTRICULAR RATE, ECG03: 74 BPM

## 2019-10-09 PROCEDURE — 87641 MR-STAPH DNA AMP PROBE: CPT

## 2019-10-09 PROCEDURE — 93005 ELECTROCARDIOGRAM TRACING: CPT | Performed by: ANESTHESIOLOGY

## 2019-10-09 PROCEDURE — 77030027138 HC INCENT SPIROMETER -A

## 2019-10-09 NOTE — PERIOP NOTES
Patient verified name and . Order for consent  found in EHR and matches case posting; patient verified. Type III surgery, walk in assessment complete. Labs per surgeon: none ; results from 9/10/19 by PCP in EMR. Results within limits  Labs per anesthesia protocol: none  EKG:performed. Abnormal results. Pt has scheduled cardiology appointment 10/17/19 with arrhythmia specialist.  Will have anesthesia review also. MRSA/MSSA swab collected; pharmacy to review and dose antibiotic as appropriate. Hospital approved surgical skin cleanser and instructions to return bottle on DOS given per hospital policy. Patient provided with handouts including Guide to Surgery, Pain Management, Hand Hygiene, Blood Transfusion Education, and [place] [facility] Brochure. Patient answered medical/surgical history questions at their best of ability. All prior to admission medications documented in [facility]. Original medication prescription bottle  visualized during patient appointment. Patient instructed to hold all vitamins 7 days prior to surgery and NSAIDS 5 days prior to surgery. Prescription medications to hold: xarelto (72 hrs prior to surgery). Patient instructed to continue previous medications as prescribed prior to surgery and to take the following medications the day of surgery according to anesthesia guidelines with a small sip of water: diltiazem,synthroid,omeprazole and potassium. Instructed to bring rescue inhaler DOS. Patient teach back successful and patient demonstrates knowledge of instruction.

## 2019-10-09 NOTE — PERIOP NOTES
[facility], [facility], left detailed voicemail message on nurse triage line asking for ok to hold xarelto 72 hr prior to surgery. Informed that pt has scheduled appointment with Dr. Maggi Howard on 10/17/19- pt will need last dose of xarelto to be 10/18/19. Please advise if ok to hold.

## 2019-10-09 NOTE — ADVANCED PRACTICE NURSE
Total Joint Surgery Preoperative Chart Review      Patient ID:  Demetrius Anderson  [id number]  56 y.o.  1945  Surgeon: Dr. Gurmeet Tran  Date of Surgery: 10/22/2019  Procedure: Total Left Knee Arthroplasty  Primary Care Physician: Donna High [phone]  Specialty Physician(s):      Subjective:   Seth Badillo is a 76 y.o. WHITE OR  female who presents for preoperative evaluation for Total Left Knee arthroplasty. This is a preoperative chart review note based on data collected by the nurse at the surgical Pre-Assessment visit. Past Medical History:   Diagnosis Date    Acquired stenosis of external ear canal unspecified as to cause 8/16/2011    Anemia 10/13/2010    Anticoagulant long-term use 06/08/2016    for AF, xarelto    Anxiety and depression 6/23/2009    Arthritis     osteo    Asthma     Symbicort daily and pro-air as needed, No hospitalizations     Atrial fibrillation ([address] 2012    AF despite flecainide and then amiodarone. AF ablation 2017    Atrophic vaginitis 5/2/2012    B12 deficiency 11/17/2010    Blood glucose elevated 2/3/2009    Chest pain     11/2008 stress nuclear: normal: 12/2011 stress nuclear: normal; 12/2014 cardiac CT: no significant calcium or obstructive CAD. Pt denies CP currrently.      Chronic pain     Dysfunctional uterine bleeding 5/2/2012    Generalized anxiety disorder 10/21/2016    GERD (gastroesophageal reflux disease) 11/08/2012    managed with meds     High risk medication use 10/21/2016    HLD (hyperlipidemia) 2/3/2009    HTN (hypertension)     controlled w/med    Hypothyroidism 10/12/2009    stable w/med daily    IBS (irritable bowel syndrome) 7/5/2011    Insomnia 3/17/2010    Leaky heart valve     Found on a Echo     Leukocytosis 1/15/2014    LVH (left ventricular hypertrophy) 1/13/2009    Microscopic hematuria 4/16/2014    Obesity     Osteopenia 1/15/2014    Pernicious anemia 1/15/2014    Pneumonia 12/30/2009    Positive occult stool blood test 10/13/2010    Skin lesion 8/16/2011    Thrombocytopenia ([address] 2/28/2013    Unspecified sleep apnea     Does not wear a c-pap     Urinary frequency 3/28/2013    Vertigo 12/9/2010      Past Surgical History:   Procedure Laterality Date    CARDIAC SURG PROCEDURE UNLIST      ablation    HX COLONOSCOPY  2010    polyps    US GUIDED CORE BREAST BIOPSY Right 1966     Family History   Problem Relation Age of Onset    Diabetes Mother     Hypertension Mother     Asthma Father     Diabetes Brother     Breast Cancer Neg Hx       Social History     Tobacco Use    Smoking status: Never Smoker    Smokeless tobacco: Never Used   Substance Use Topics    Alcohol use: No       Prior to Admission medications    Medication Sig Start Date End Date Taking? Authorizing Provider   furosemide (LASIX) 20 mg tablet Furosemide 20 Mg, one PO daily 9/17/19  Yes Melina Diamond MD   lisinopril (PRINIVIL, ZESTRIL) 20 mg tablet Take 1 Tab by mouth daily. 9/17/19  Yes Melina Diamond MD   montelukast (SINGULAIR) 10 mg tablet Take 1 Tab by mouth daily. Patient taking differently: Take 10 mg by mouth nightly. 9/17/19  Yes Melina Diamond MD   omeprazole (PRILOSEC) 20 mg capsule Take 1 Cap by mouth two (2) times a day. Patient taking differently: Take 20 mg by mouth two (2) times a day. Per anesthesia protocol:instructed to take am of surgery. 9/17/19  Yes Melina Diamond MD   budesonide-formoterol [facility]) 160-4.5 mcg/actuation HFAA Take 2 Puffs by inhalation two (2) times a day. 9/17/19  Yes Melina Diamond MD   zolpidem (AMBIEN) 5 mg tablet Take 1 Tab by mouth nightly as needed for Sleep. Max Daily Amount: 5 mg. 9/17/19  Yes Melina Diamond MD   rivaroxaban (XARELTO) 20 mg tab tablet Take 1 Tab by mouth daily. Patient taking differently: Take 20 mg by mouth nightly. Per anesthesia protocol: pt instructed to hold 72 hrs prior to day of surgery.  9/17/19  Yes Melina Diamond MD levothyroxine (SYNTHROID) 112 mcg tablet Take 1 Tab by mouth Daily (before breakfast). Patient taking differently: Take 112 mcg by mouth Daily (before breakfast). Per anesthesia protocol:instructed to take am of surgery. 9/17/19  Yes Yolanda Carrion MD   [facility]) 90 mcg/actuation inhaler Take 1 Puff by inhalation every four (4) hours as needed for Wheezing. Patient instructed to bring med (in Rx bottle)to hospital   Yes Provider, Historical   potassium chloride (K-DUR, KLOR-CON) 20 mEq tablet Take 1 Tab by mouth daily. Patient taking differently: Take 20 mEq by mouth daily. Per anesthesia protocol:instructed to take am of surgery. 4/30/19  Yes Yolanda Carrion MD   dilTIAZem XR (DILACOR XR) 240 mg XR capsule Take 1 Cap by mouth daily. Patient taking differently: Take 240 mg by mouth daily. Per anesthesia protocol:instructed to take am of surgery. Indications: high blood pressure, Ventricular Rate Control in Atrial Fibrillation 1/16/19  Yes Yolanda Carrion MD   cholecalciferol, vitamin D3, 5,000 unit TbDi Take 5,000 Units by mouth daily. Indications: VITAMIN D DEFICIENCY (HIGH DOSE THERAPY) 9/18/18  Yes Yolanda Carrion MD   flecainide [facility] [place]) 100 mg tablet Take 200 mg by mouth daily as needed. 3/12/18  Yes Provider, Historical   cyanocobalamin (VITAMIN B-12) 1,000 mcg tablet Take 1,000 mcg by mouth daily. Yes Provider, Historical   vitamin e 600 unit capsule Take 400 Units by mouth daily. Yes Provider, Historical   loperamide (IMMODIUM) 2 mg tablet Take 2 mg by mouth four (4) times daily as needed for Diarrhea.    Yes Provider, Historical   OTHER Please provide patient with custom orthotic and extra depth shoes  Dx.M20.11, M20.5x1 2/19/18   Yolanda Carrion MD     Allergies   Allergen Reactions    Betamethasone Anxiety     Jittery  Jittery    Hydrochlorothiazide Other (comments)     Interaction with Tikosyn    Albuterol Other (comments)     tachycardia/palpitations    Allegra-D 12 Hour [Fexofenadine-Pseudoephedrine] Other (comments)    Bactrim [Sulfamethoprim Ds] Other (comments)     Interaction with Tikosyn    Ciprofloxacin Other (comments)     Interaction with Tikosyn    Claritin-D 12 Hour [Loratadine-Pseudoephedrine] Other (comments)     Interaction with Tikosyn    Cortisone Other (comments)     Extreme redness to face    Cymbalta [Duloxetine] Other (comments)     FOGGY    Effexor [Venlafaxine] Nausea Only    Lexapro [Escitalopram] Nausea Only    Pristiq [Desvenlafaxine] Other (comments)     Affected her mental abilities    Rozerem [Ramelteon] Other (comments)     SPACEY      Zoloft [Sertraline] Shortness of Breath    Zyrtec-D [Cetirizine-Pseudoephedrine] Other (comments)     Interaction with Tikosyn          Objective:     Physical Exam:   Patient Vitals for the past 24 hrs:   Temp Pulse Resp BP SpO2   10/09/19 1020 97.3 °F (36.3 °C) 76 12 135/72 97 %       ECG:    EKG Results     Procedure 720 Value Units Date/Time    EKG, 12 LEAD, INITIAL [[id number]]     Order Status:  Sent           Data Review:   Labs:   Recent Results (from the past 24 hour(s))   MSSA/MRSA SC BY PCR, NASAL SWAB    Collection Time: 10/09/19 11:13 AM   Result Value Ref Range    Special Requests: NO SPECIAL REQUESTS      Culture result:        SA target not detected. A MRSA NEGATIVE, SA NEGATIVE test result does not preclude MRSA or SA nasal colonization. Results for Paul Vance (MRN [id number]) as of 10/9/2019 15:06   Ref.  Range 9/10/2019 10:32   Sodium Latest Ref Range: 134 - 144 mmol/L 140   Potassium Latest Ref Range: 3.5 - 5.2 mmol/L 4.3   Chloride Latest Ref Range: 96 - 106 mmol/L 100   CO2 Latest Ref Range: 20 - 29 mmol/L 23   Glucose Latest Ref Range: 65 - 99 mg/dL 104 (H)   BUN Latest Ref Range: 8 - 27 mg/dL 21   Creatinine Latest Ref Range: 0.57 - 1.00 mg/dL 0.81   BUN/Creatinine ratio Latest Ref Range: 12 - 28  26   Calcium Latest Ref Range: 8.7 - 10.3 mg/dL 9.5   Magnesium Latest Ref Range: 1.6 - 2.3 mg/dL 2.1   GFR est non-AA Latest Ref Range: >59 mL/min/1.73 72   GFR est AA Latest Ref Range: >59 mL/min/1.73 83     Results for Rody Elena (MRN [id number]) as of 10/9/2019 15:06   Ref. Range 9/10/2019 10:32   WBC Latest Ref Range: 3.4 - 10.8 x10E3/uL 7.3   RBC Latest Ref Range: 3.77 - 5.28 x10E6/uL 4.50   HGB Latest Ref Range: 11.1 - 15.9 g/dL 12.9   HCT Latest Ref Range: 34.0 - 46.6 % 37.4   MCV Latest Ref Range: 79 - 97 fL 83   MCH Latest Ref Range: 26.6 - 33.0 pg 28.7   MCHC Latest Ref Range: 31.5 - 35.7 g/dL 34.5   RDW Latest Ref Range: 12.3 - 15.4 % 14.6   PLATELET Latest Ref Range: 150 - 450 x10E3/uL 209     Problem List:  )  Patient Active Problem List   Diagnosis Code    Microhematuria R31.29    Snoring R06.83    Atrial fibrillation (HCC) I48.91    Hypertension I10    Gastroesophageal reflux disease K21.9    Persistent disorder of initiating or maintaining sleep G47.00    Hypothyroidism E03.9    Bifascicular block I45.2    HLD (hyperlipidemia) E78.5    LVH (left ventricular hypertrophy) I51.7    Osteopenia M85.80    MR (mitral regurgitation) I34.0    Depression F32.9    GERD (gastroesophageal reflux disease) K21.9    TR (tricuspid regurgitation) I07.1    B12 deficiency E53.8    Asthma J45.909    Obstructive sleep apnea syndrome G47.33    Chest pain, unspecified R07.9    Palpitations R00.2    Syncope and collapse R55    Obesity E66.9    Anticoagulant long-term use Z79.01    Essential hypertension I10    High risk medication use Z79.899    Non morbid obesity with alveolar hypoventilation [id number]    Generalized anxiety disorder F41.1    On continuous oral anticoagulation Z79.01    BPPV (benign paroxysmal positional vertigo) H81.10    Hypovitaminosis D E55.9    Moderate persistent asthma without complication W20.32    Environmental allergies Z91.09    Obesity (BMI 30.0-34. 9) E66.9    [facility] F33.9    Fasting hyperglycemia R73.01    On potassium wasting diuretic therapy Z79.899    Elevated LDL cholesterol level E78.00    Irritable bowel syndrome with both constipation and diarrhea K58.2    Noncompliance with CPAP treatment Z91.14       Total Joint Surgery Pre-Assessment Recommendations: The patient is not compliant with wearing CPAP. Recommend oxygen saturation monitoring during hospitalization and oxygen at 3 liter via nc qhs.   PEP therapy BID        Signed By: GINGER Ronquillo    October 9, 2019

## 2019-10-17 NOTE — PERIOP NOTES
Received faxed note from cardiology, OK to hold xarelto 72 hrs prior to surgery. Note placed on hard chart for reference.

## 2019-10-18 NOTE — H&P
H&P    Patient ID:  Radha Cooney  [id number]  76 y.o.  1945  Surgeon:  Ryan Pedraza MD  Date of Surgery: * No surgery date entered *  Procedure: Left Knee Total Arthroplasty  Primary Care Physician: Bryan Forman MD        Subjective:  Radha Cooney is a 76 y.o. WHITE OR  female who presents with Left Knee pain. She has history of Left Knee pain for several months. Symptoms worse with walking long distances and relieved with rest. Conservative treatment consisting of  activity modification and injections have not helped. The patient lives with their family. The patients goal after surgery is improved pain and function. Past Medical History:   Diagnosis Date    Acquired stenosis of external ear canal unspecified as to cause 8/16/2011    Anemia 10/13/2010    Anticoagulant long-term use 06/08/2016    for AF, xarelto    Anxiety and depression 6/23/2009    Arthritis     osteo    Asthma     Symbicort daily and pro-air as needed, No hospitalizations     Atrial fibrillation ([address] 2012    AF despite flecainide and then amiodarone. AF ablation 2017    Atrophic vaginitis 5/2/2012    B12 deficiency 11/17/2010    Blood glucose elevated 2/3/2009    Chest pain     11/2008 stress nuclear: normal: 12/2011 stress nuclear: normal; 12/2014 cardiac CT: no significant calcium or obstructive CAD. Pt denies CP currrently.      Chronic pain     Dysfunctional uterine bleeding 5/2/2012    Generalized anxiety disorder 10/21/2016    GERD (gastroesophageal reflux disease) 11/08/2012    managed with meds     High risk medication use 10/21/2016    HLD (hyperlipidemia) 2/3/2009    HTN (hypertension)     controlled w/med    Hypothyroidism 10/12/2009    stable w/med daily    IBS (irritable bowel syndrome) 7/5/2011    Insomnia 3/17/2010    Leaky heart valve     Found on a Echo     Leukocytosis 1/15/2014    LVH (left ventricular hypertrophy) 1/13/2009    Microscopic hematuria 4/16/2014    Obesity     Osteopenia 1/15/2014    Pernicious anemia 1/15/2014    Pneumonia 12/30/2009    Positive occult stool blood test 10/13/2010    Skin lesion 8/16/2011    Thrombocytopenia ([address] 2/28/2013    Unspecified sleep apnea     Does not wear a c-pap     Urinary frequency 3/28/2013    Vertigo 12/9/2010      Past Surgical History:   Procedure Laterality Date    CARDIAC SURG PROCEDURE UNLIST      ablation    HX COLONOSCOPY  2010    polyps    US GUIDED CORE BREAST BIOPSY Right 1966     Family History   Problem Relation Age of Onset    Diabetes Mother     Hypertension Mother     Asthma Father     Diabetes Brother     Breast Cancer Neg Hx       Social History     Tobacco Use    Smoking status: Never Smoker    Smokeless tobacco: Never Used   Substance Use Topics    Alcohol use: No       Prior to Admission medications    Medication Sig Start Date End Date Taking? Authorizing Provider   furosemide (LASIX) 20 mg tablet Furosemide 20 Mg, one PO daily 9/17/19   Jin Kline MD   lisinopril (PRINIVIL, ZESTRIL) 20 mg tablet Take 1 Tab by mouth daily. 9/17/19   Jin Kline MD   montelukast (SINGULAIR) 10 mg tablet Take 1 Tab by mouth daily. Patient taking differently: Take 10 mg by mouth nightly. 9/17/19   Jin Kline MD   omeprazole (PRILOSEC) 20 mg capsule Take 1 Cap by mouth two (2) times a day. Patient taking differently: Take 20 mg by mouth two (2) times a day. Per anesthesia protocol:instructed to take am of surgery. 9/17/19   Jin Kline MD   budesonide-formoterol [facility]) 160-4.5 mcg/actuation HFAA Take 2 Puffs by inhalation two (2) times a day. 9/17/19   Jin Kline MD   zolpidem (AMBIEN) 5 mg tablet Take 1 Tab by mouth nightly as needed for Sleep. Max Daily Amount: 5 mg. 9/17/19   Jin Kline MD   rivaroxaban (XARELTO) 20 mg tab tablet Take 1 Tab by mouth daily. Patient taking differently: Take 20 mg by mouth nightly.  Per anesthesia protocol: pt instructed to hold 72 hrs prior to day of surgery. 9/17/19   Felice Ferrera MD   levothyroxine (SYNTHROID) 112 mcg tablet Take 1 Tab by mouth Daily (before breakfast). Patient taking differently: Take 112 mcg by mouth Daily (before breakfast). Per anesthesia protocol:instructed to take am of surgery. 9/17/19   Felice Ferrera MD   albuterol [facility]) 90 mcg/actuation inhaler Take 1 Puff by inhalation every four (4) hours as needed for Wheezing. Patient instructed to bring med (in Rx bottle)to hospital    Provider, Historical   potassium chloride (K-DUR, KLOR-CON) 20 mEq tablet Take 1 Tab by mouth daily. Patient taking differently: Take 20 mEq by mouth daily. Per anesthesia protocol:instructed to take am of surgery. 4/30/19   Felice Ferrera MD   dilTIAZem XR (DILACOR XR) 240 mg XR capsule Take 1 Cap by mouth daily. Patient taking differently: Take 240 mg by mouth daily. Per anesthesia protocol:instructed to take am of surgery. Indications: high blood pressure, Ventricular Rate Control in Atrial Fibrillation 1/16/19   Felice Ferrera MD   cholecalciferol, vitamin D3, 5,000 unit TbDi Take 5,000 Units by mouth daily. Indications: VITAMIN D DEFICIENCY (HIGH DOSE THERAPY) 9/18/18   Felice Ferrera MD   flecainide [facility] [place]) 100 mg tablet Take 200 mg by mouth daily as needed. 3/12/18   Provider, Historical   cyanocobalamin (VITAMIN B-12) 1,000 mcg tablet Take 1,000 mcg by mouth daily. Provider, Historical   OTHER Please provide patient with custom orthotic and extra depth shoes  Dx.M20.11, M20.5x1 2/19/18   Felice Ferrera MD   vitamin e 600 unit capsule Take 400 Units by mouth daily. Provider, Historical   loperamide (IMMODIUM) 2 mg tablet Take 2 mg by mouth four (4) times daily as needed for Diarrhea.     Provider, Historical     Allergies   Allergen Reactions    Betamethasone Anxiety     Jittery  Jittery    Hydrochlorothiazide Other (comments)     Interaction with Tikosyn  Albuterol Other (comments)     tachycardia/palpitations    Allegra-D 12 Hour [Fexofenadine-Pseudoephedrine] Other (comments)    Bactrim [Sulfamethoprim Ds] Other (comments)     Interaction with Tikosyn    Ciprofloxacin Other (comments)     Interaction with Tikosyn    Claritin-D 12 Hour [Loratadine-Pseudoephedrine] Other (comments)     Interaction with Tikosyn    Cortisone Other (comments)     Extreme redness to face    Cymbalta [Duloxetine] Other (comments)     FOGGY    Effexor [Venlafaxine] Nausea Only    Lexapro [Escitalopram] Nausea Only    Pristiq [Desvenlafaxine] Other (comments)     Affected her mental abilities    Rozerem [Ramelteon] Other (comments)     SPACEY      Zoloft [Sertraline] Shortness of Breath    Zyrtec-D [Cetirizine-Pseudoephedrine] Other (comments)     Interaction with Tikosyn        REVIEW OF SYSTEMS:  CONSTITUTIONAL: Denies fever, decreased appetite, weight loss/gain, night sweats or fatigue. HEENT: Denies vision or hearing changes. denies glasses. denies hearing aids. CARDIAC: Denies CP, palpitations, rheumatic fever, murmur, peripheral edema, carotid artery disease or syncopal episodes. RESPIRATORY: Denies dyspnea on exertion, asthma, COPD or orthopnea. GI: Denies GERD, history of GI bleed or melena, PUD, hepatitis or cirrhosis. : Denies dysuria, hematuria. denies BPH symptoms. HEMATOLOGIC: Denies anemia or blood disorders. ENDOCRINE: Denies thyroid disease. MUSCULOSKELETAL: See HPI. NEUROLOGIC: Denies seizure, peripheral neuropathy or memory loss. PSYCH: Denies depression, anxiety or insomnia. SKIN: Denies rash or open sores. Objective:    PHYSICAL EXAM  GENERAL: No data found. EYES: PERRL. EOM intact. MOUTH:Teeth and Gums normal. NECK: Full ROM. Trachea midline. No thyromegaly or JVD. CARDIOVASCULAR: Regular rate and rhythm. No murmur or gallops. No carotid bruits. Peripheral pulses: radial 2 +, PT 2+, DP 2+ bilaterally. LUNGS: CTA bilaterally.  No wheezes, rhonchi or rales. GI: positive BS. Abdomen nontender. NEUROLOGIC: Alert and oriented x 3. Bilateral equal strong had grasp and bilateral equal strong plantar flexion and dorsiflexion. GAIT: abnormal MUSCULOSKELETAL: ROM: full with pain. Tenderness: over the medial and lateral joint lines. Crepitus: present. SKIN: No rash, bruising, swelling, redness or warmth. Labs:  No results found for this or any previous visit (from the past 24 hour(s)). Xray Left Knee: joint space narrowing    Assessment:  Advanced Left Knee Osteoarthritis. Total Left Knee Arthroplasty Indicated. Patient Active Problem List   Diagnosis Code    Microhematuria R31.29    Snoring R06.83    Atrial fibrillation (HCC) I48.91    Hypertension I10    Gastroesophageal reflux disease K21.9    Persistent disorder of initiating or maintaining sleep G47.00    Hypothyroidism E03.9    Bifascicular block I45.2    HLD (hyperlipidemia) E78.5    LVH (left ventricular hypertrophy) I51.7    Osteopenia M85.80    MR (mitral regurgitation) I34.0    Depression F32.9    GERD (gastroesophageal reflux disease) K21.9    TR (tricuspid regurgitation) I07.1    B12 deficiency E53.8    Asthma J45.909    Obstructive sleep apnea syndrome G47.33    Chest pain, unspecified R07.9    Palpitations R00.2    Syncope and collapse R55    Obesity E66.9    Anticoagulant long-term use Z79.01    Essential hypertension I10    High risk medication use Z79.899    Non morbid obesity with alveolar hypoventilation [id number]    Generalized anxiety disorder F41.1    On continuous oral anticoagulation Z79.01    BPPV (benign paroxysmal positional vertigo) H81.10    Hypovitaminosis D E55.9    Moderate persistent asthma without complication J76.76    Environmental allergies Z91.09    Obesity (BMI 30.0-34. 9) E66.9    Recurrent depression (HCC) F33.9    Fasting hyperglycemia R73.01    On potassium wasting diuretic therapy Z79.899    Elevated LDL cholesterol level E78.00    Irritable bowel syndrome with both constipation and diarrhea K58.2    Noncompliance with CPAP treatment Z91.14       Plan:  I have advised the patient of the risks and consequences, including possible complications of performing total joint replacement, as well as not doing this operation. The patient had the opportunity to ask questions and have them answered to their satisfaction.      Signed:  BISHNU Mcgregor 10/18/2019

## 2019-10-21 ENCOUNTER — ANESTHESIA EVENT (OUTPATIENT)
Dept: SURGERY | Age: 74
DRG: 470 | End: 2019-10-21
Payer: MEDICARE

## 2019-10-22 ENCOUNTER — ANESTHESIA (OUTPATIENT)
Dept: SURGERY | Age: 74
DRG: 470 | End: 2019-10-22
Payer: MEDICARE

## 2019-10-22 ENCOUNTER — HOME HEALTH ADMISSION (OUTPATIENT)
Dept: HOME HEALTH SERVICES | Facility: HOME HEALTH | Age: 74
End: 2019-10-22
Payer: MEDICARE

## 2019-10-22 ENCOUNTER — HOSPITAL ENCOUNTER (INPATIENT)
Age: 74
LOS: 2 days | Discharge: HOME HEALTH | DRG: 470 | End: 2019-10-24
Attending: ORTHOPAEDIC SURGERY | Admitting: ORTHOPAEDIC SURGERY
Payer: MEDICARE

## 2019-10-22 DIAGNOSIS — Z96.652 TOTAL KNEE REPLACEMENT STATUS, LEFT: Primary | ICD-10-CM

## 2019-10-22 DIAGNOSIS — I48.0 PAROXYSMAL ATRIAL FIBRILLATION (HCC): Chronic | ICD-10-CM

## 2019-10-22 PROBLEM — M17.12 ARTHRITIS OF KNEE, LEFT: Status: ACTIVE | Noted: 2019-10-22

## 2019-10-22 LAB
APTT PPP: 30.5 SEC (ref 24.7–39.8)
GLUCOSE BLD STRIP.AUTO-MCNC: 111 MG/DL (ref 65–100)
HGB BLD-MCNC: 10.6 G/DL (ref 11.7–15.4)

## 2019-10-22 PROCEDURE — C1776 JOINT DEVICE (IMPLANTABLE): HCPCS | Performed by: ORTHOPAEDIC SURGERY

## 2019-10-22 PROCEDURE — 85730 THROMBOPLASTIN TIME PARTIAL: CPT

## 2019-10-22 PROCEDURE — 77030006720 HC BLD PAT RMR ZIMM -B: Performed by: ORTHOPAEDIC SURGERY

## 2019-10-22 PROCEDURE — 82962 GLUCOSE BLOOD TEST: CPT

## 2019-10-22 PROCEDURE — 74011250637 HC RX REV CODE- 250/637: Performed by: ORTHOPAEDIC SURGERY

## 2019-10-22 PROCEDURE — 74011000250 HC RX REV CODE- 250: Performed by: ORTHOPAEDIC SURGERY

## 2019-10-22 PROCEDURE — 85018 HEMOGLOBIN: CPT

## 2019-10-22 PROCEDURE — 76010010054 HC POST OP PAIN BLOCK: Performed by: ORTHOPAEDIC SURGERY

## 2019-10-22 PROCEDURE — 97110 THERAPEUTIC EXERCISES: CPT

## 2019-10-22 PROCEDURE — 77010033678 HC OXYGEN DAILY

## 2019-10-22 PROCEDURE — 74011250636 HC RX REV CODE- 250/636

## 2019-10-22 PROCEDURE — 74011250636 HC RX REV CODE- 250/636: Performed by: ANESTHESIOLOGY

## 2019-10-22 PROCEDURE — 77030037715 HC DRSG ZIP STRY -B: Performed by: ORTHOPAEDIC SURGERY

## 2019-10-22 PROCEDURE — 76942 ECHO GUIDE FOR BIOPSY: CPT | Performed by: ORTHOPAEDIC SURGERY

## 2019-10-22 PROCEDURE — 77030007880 HC KT SPN EPDRL BBMI -B: Performed by: ANESTHESIOLOGY

## 2019-10-22 PROCEDURE — 77030012935 HC DRSG AQUACEL BMS -B: Performed by: ORTHOPAEDIC SURGERY

## 2019-10-22 PROCEDURE — 76060000035 HC ANESTHESIA 2 TO 2.5 HR: Performed by: ORTHOPAEDIC SURGERY

## 2019-10-22 PROCEDURE — 74011000250 HC RX REV CODE- 250

## 2019-10-22 PROCEDURE — 94760 N-INVAS EAR/PLS OXIMETRY 1: CPT

## 2019-10-22 PROCEDURE — 77030003665 HC NDL SPN BBMI -A: Performed by: ANESTHESIOLOGY

## 2019-10-22 PROCEDURE — 0SRD0J9 REPLACEMENT OF LEFT KNEE JOINT WITH SYNTHETIC SUBSTITUTE, CEMENTED, OPEN APPROACH: ICD-10-PCS | Performed by: ORTHOPAEDIC SURGERY

## 2019-10-22 PROCEDURE — 74011000258 HC RX REV CODE- 258: Performed by: ORTHOPAEDIC SURGERY

## 2019-10-22 PROCEDURE — 36415 COLL VENOUS BLD VENIPUNCTURE: CPT

## 2019-10-22 PROCEDURE — 74011250637 HC RX REV CODE- 250/637: Performed by: ANESTHESIOLOGY

## 2019-10-22 PROCEDURE — 77030012341 HC CHMB SPCR OPTC MDI VYRM -A

## 2019-10-22 PROCEDURE — 65270000029 HC RM PRIVATE

## 2019-10-22 PROCEDURE — 77030020256 HC SOL INJ NACL 0.9%  500ML: Performed by: ORTHOPAEDIC SURGERY

## 2019-10-22 PROCEDURE — 77030018836 HC SOL IRR NACL ICUM -A: Performed by: ORTHOPAEDIC SURGERY

## 2019-10-22 PROCEDURE — 76210000016 HC OR PH I REC 1 TO 1.5 HR: Performed by: ORTHOPAEDIC SURGERY

## 2019-10-22 PROCEDURE — 77030020782 HC GWN BAIR PAWS FLX 3M -B: Performed by: ANESTHESIOLOGY

## 2019-10-22 PROCEDURE — C1713 ANCHOR/SCREW BN/BN,TIS/BN: HCPCS | Performed by: ORTHOPAEDIC SURGERY

## 2019-10-22 PROCEDURE — 76010000162 HC OR TIME 1.5 TO 2 HR INTENSV-TIER 1: Performed by: ORTHOPAEDIC SURGERY

## 2019-10-22 PROCEDURE — 97165 OT EVAL LOW COMPLEX 30 MIN: CPT

## 2019-10-22 PROCEDURE — 77030031139 HC SUT VCRL2 J&J -A: Performed by: ORTHOPAEDIC SURGERY

## 2019-10-22 PROCEDURE — 77030035236 HC SUT PDS STRATFX BARB J&J -B: Performed by: ORTHOPAEDIC SURGERY

## 2019-10-22 PROCEDURE — 77030003602 HC NDL NRV BLK BBMI -B: Performed by: ANESTHESIOLOGY

## 2019-10-22 PROCEDURE — 74011250636 HC RX REV CODE- 250/636: Performed by: ORTHOPAEDIC SURGERY

## 2019-10-22 PROCEDURE — 94664 DEMO&/EVAL PT USE INHALER: CPT

## 2019-10-22 PROCEDURE — 94762 N-INVAS EAR/PLS OXIMTRY CONT: CPT

## 2019-10-22 PROCEDURE — 97161 PT EVAL LOW COMPLEX 20 MIN: CPT

## 2019-10-22 PROCEDURE — 77030037714 HC CLOSR DEV INCIS ZIP STRY -C: Performed by: ORTHOPAEDIC SURGERY

## 2019-10-22 DEVICE — IMPLANTABLE DEVICE: Type: IMPLANTABLE DEVICE | Site: KNEE | Status: FUNCTIONAL

## 2019-10-22 DEVICE — CEMENT BNE SIMPLEX W/O GENT -- PK/10 ONLY: Type: IMPLANTABLE DEVICE | Site: KNEE | Status: FUNCTIONAL

## 2019-10-22 DEVICE — COMPONENT FEM SZ 5 L KNEE POST STBL CEM ATTUNE: Type: IMPLANTABLE DEVICE | Site: KNEE | Status: FUNCTIONAL

## 2019-10-22 DEVICE — STEM FEM L50MM DIA14MM KNEE CEM REV ATTUNE: Type: IMPLANTABLE DEVICE | Site: KNEE | Status: FUNCTIONAL

## 2019-10-22 DEVICE — BASEPLATE TIB SZ 5 FIX BEAR CO CHROM MOLYBDENUM TI ALLY END: Type: IMPLANTABLE DEVICE | Site: KNEE | Status: FUNCTIONAL

## 2019-10-22 RX ORDER — CELECOXIB 200 MG/1
200 CAPSULE ORAL EVERY 12 HOURS
Status: DISCONTINUED | OUTPATIENT
Start: 2019-10-22 | End: 2019-10-24 | Stop reason: HOSPADM

## 2019-10-22 RX ORDER — PROMETHAZINE HYDROCHLORIDE 25 MG/1
25 TABLET ORAL
Status: DISCONTINUED | OUTPATIENT
Start: 2019-10-22 | End: 2019-10-24 | Stop reason: HOSPADM

## 2019-10-22 RX ORDER — DIPHENHYDRAMINE HCL 25 MG
25 CAPSULE ORAL
Status: DISCONTINUED | OUTPATIENT
Start: 2019-10-22 | End: 2019-10-24 | Stop reason: HOSPADM

## 2019-10-22 RX ORDER — HYDROMORPHONE HYDROCHLORIDE 2 MG/ML
0.5 INJECTION, SOLUTION INTRAMUSCULAR; INTRAVENOUS; SUBCUTANEOUS
Status: DISCONTINUED | OUTPATIENT
Start: 2019-10-22 | End: 2019-10-22 | Stop reason: HOSPADM

## 2019-10-22 RX ORDER — ACETAMINOPHEN 500 MG
1000 TABLET ORAL
Status: COMPLETED | OUTPATIENT
Start: 2019-10-22 | End: 2019-10-22

## 2019-10-22 RX ORDER — CEFAZOLIN SODIUM/WATER 2 G/20 ML
2 SYRINGE (ML) INTRAVENOUS ONCE
Status: COMPLETED | OUTPATIENT
Start: 2019-10-22 | End: 2019-10-22

## 2019-10-22 RX ORDER — LEVOTHYROXINE SODIUM 112 UG/1
112 TABLET ORAL
Status: DISCONTINUED | OUTPATIENT
Start: 2019-10-23 | End: 2019-10-24 | Stop reason: HOSPADM

## 2019-10-22 RX ORDER — DIPHENHYDRAMINE HYDROCHLORIDE 50 MG/ML
12.5 INJECTION, SOLUTION INTRAMUSCULAR; INTRAVENOUS
Status: DISCONTINUED | OUTPATIENT
Start: 2019-10-22 | End: 2019-10-22 | Stop reason: HOSPADM

## 2019-10-22 RX ORDER — MIDAZOLAM HYDROCHLORIDE 1 MG/ML
2 INJECTION, SOLUTION INTRAMUSCULAR; INTRAVENOUS
Status: COMPLETED | OUTPATIENT
Start: 2019-10-22 | End: 2019-10-22

## 2019-10-22 RX ORDER — CEFAZOLIN SODIUM/WATER 2 G/20 ML
2 SYRINGE (ML) INTRAVENOUS EVERY 8 HOURS
Status: COMPLETED | OUTPATIENT
Start: 2019-10-22 | End: 2019-10-23

## 2019-10-22 RX ORDER — ONDANSETRON 4 MG/1
8 TABLET, ORALLY DISINTEGRATING ORAL
Status: DISCONTINUED | OUTPATIENT
Start: 2019-10-22 | End: 2019-10-24 | Stop reason: HOSPADM

## 2019-10-22 RX ORDER — ZOLPIDEM TARTRATE 5 MG/1
5 TABLET ORAL
Status: DISCONTINUED | OUTPATIENT
Start: 2019-10-22 | End: 2019-10-24 | Stop reason: HOSPADM

## 2019-10-22 RX ORDER — ALBUTEROL SULFATE 0.83 MG/ML
2.5 SOLUTION RESPIRATORY (INHALATION)
Status: DISCONTINUED | OUTPATIENT
Start: 2019-10-22 | End: 2019-10-24 | Stop reason: HOSPADM

## 2019-10-22 RX ORDER — DILTIAZEM HYDROCHLORIDE 240 MG/1
240 CAPSULE, COATED, EXTENDED RELEASE ORAL DAILY
Status: DISCONTINUED | OUTPATIENT
Start: 2019-10-23 | End: 2019-10-24 | Stop reason: HOSPADM

## 2019-10-22 RX ORDER — SODIUM CHLORIDE 9 MG/ML
100 INJECTION, SOLUTION INTRAVENOUS CONTINUOUS
Status: DISPENSED | OUTPATIENT
Start: 2019-10-22 | End: 2019-10-24

## 2019-10-22 RX ORDER — ONDANSETRON 2 MG/ML
INJECTION INTRAMUSCULAR; INTRAVENOUS AS NEEDED
Status: DISCONTINUED | OUTPATIENT
Start: 2019-10-22 | End: 2019-10-22 | Stop reason: HOSPADM

## 2019-10-22 RX ORDER — OXYCODONE HYDROCHLORIDE 5 MG/1
5 TABLET ORAL
Status: DISCONTINUED | OUTPATIENT
Start: 2019-10-22 | End: 2019-10-22 | Stop reason: HOSPADM

## 2019-10-22 RX ORDER — SODIUM CHLORIDE 0.9 % (FLUSH) 0.9 %
5-40 SYRINGE (ML) INJECTION AS NEEDED
Status: DISCONTINUED | OUTPATIENT
Start: 2019-10-22 | End: 2019-10-24 | Stop reason: HOSPADM

## 2019-10-22 RX ORDER — ACETAMINOPHEN 500 MG
1000 TABLET ORAL EVERY 6 HOURS
Status: DISCONTINUED | OUTPATIENT
Start: 2019-10-23 | End: 2019-10-23

## 2019-10-22 RX ORDER — OXYCODONE HYDROCHLORIDE 5 MG/1
5-10 TABLET ORAL
Status: DISCONTINUED | OUTPATIENT
Start: 2019-10-22 | End: 2019-10-23

## 2019-10-22 RX ORDER — SODIUM CHLORIDE, SODIUM LACTATE, POTASSIUM CHLORIDE, CALCIUM CHLORIDE 600; 310; 30; 20 MG/100ML; MG/100ML; MG/100ML; MG/100ML
100 INJECTION, SOLUTION INTRAVENOUS CONTINUOUS
Status: DISCONTINUED | OUTPATIENT
Start: 2019-10-22 | End: 2019-10-22 | Stop reason: HOSPADM

## 2019-10-22 RX ORDER — CELECOXIB 200 MG/1
200 CAPSULE ORAL ONCE
Status: COMPLETED | OUTPATIENT
Start: 2019-10-22 | End: 2019-10-22

## 2019-10-22 RX ORDER — FLECAINIDE ACETATE 100 MG/1
200 TABLET ORAL
Status: DISCONTINUED | OUTPATIENT
Start: 2019-10-22 | End: 2019-10-22

## 2019-10-22 RX ORDER — FENTANYL CITRATE 50 UG/ML
INJECTION, SOLUTION INTRAMUSCULAR; INTRAVENOUS AS NEEDED
Status: DISCONTINUED | OUTPATIENT
Start: 2019-10-22 | End: 2019-10-22 | Stop reason: HOSPADM

## 2019-10-22 RX ORDER — ROPIVACAINE HYDROCHLORIDE 2 MG/ML
INJECTION, SOLUTION EPIDURAL; INFILTRATION; PERINEURAL
Status: COMPLETED | OUTPATIENT
Start: 2019-10-22 | End: 2019-10-22

## 2019-10-22 RX ORDER — LIDOCAINE HYDROCHLORIDE 10 MG/ML
0.1 INJECTION INFILTRATION; PERINEURAL AS NEEDED
Status: DISCONTINUED | OUTPATIENT
Start: 2019-10-22 | End: 2019-10-22 | Stop reason: HOSPADM

## 2019-10-22 RX ORDER — PANTOPRAZOLE SODIUM 40 MG/1
40 TABLET, DELAYED RELEASE ORAL
Status: DISCONTINUED | OUTPATIENT
Start: 2019-10-23 | End: 2019-10-24 | Stop reason: HOSPADM

## 2019-10-22 RX ORDER — MIDAZOLAM HYDROCHLORIDE 1 MG/ML
2 INJECTION, SOLUTION INTRAMUSCULAR; INTRAVENOUS
Status: DISCONTINUED | OUTPATIENT
Start: 2019-10-22 | End: 2019-10-22 | Stop reason: HOSPADM

## 2019-10-22 RX ORDER — BUDESONIDE AND FORMOTEROL FUMARATE DIHYDRATE 160; 4.5 UG/1; UG/1
2 AEROSOL RESPIRATORY (INHALATION)
Status: DISCONTINUED | OUTPATIENT
Start: 2019-10-22 | End: 2019-10-24 | Stop reason: HOSPADM

## 2019-10-22 RX ORDER — ASPIRIN 81 MG/1
81 TABLET ORAL EVERY 12 HOURS
Status: DISCONTINUED | OUTPATIENT
Start: 2019-10-22 | End: 2019-10-24 | Stop reason: HOSPADM

## 2019-10-22 RX ORDER — FENTANYL CITRATE 50 UG/ML
100 INJECTION, SOLUTION INTRAMUSCULAR; INTRAVENOUS
Status: COMPLETED | OUTPATIENT
Start: 2019-10-22 | End: 2019-10-22

## 2019-10-22 RX ORDER — HYDROMORPHONE HYDROCHLORIDE 1 MG/ML
1 INJECTION, SOLUTION INTRAMUSCULAR; INTRAVENOUS; SUBCUTANEOUS
Status: DISCONTINUED | OUTPATIENT
Start: 2019-10-22 | End: 2019-10-24 | Stop reason: HOSPADM

## 2019-10-22 RX ORDER — PROPOFOL 10 MG/ML
INJECTION, EMULSION INTRAVENOUS
Status: DISCONTINUED | OUTPATIENT
Start: 2019-10-22 | End: 2019-10-22 | Stop reason: HOSPADM

## 2019-10-22 RX ORDER — NALOXONE HYDROCHLORIDE 0.4 MG/ML
.2-.4 INJECTION, SOLUTION INTRAMUSCULAR; INTRAVENOUS; SUBCUTANEOUS
Status: DISCONTINUED | OUTPATIENT
Start: 2019-10-22 | End: 2019-10-24 | Stop reason: HOSPADM

## 2019-10-22 RX ORDER — SODIUM CHLORIDE, SODIUM LACTATE, POTASSIUM CHLORIDE, CALCIUM CHLORIDE 600; 310; 30; 20 MG/100ML; MG/100ML; MG/100ML; MG/100ML
75 INJECTION, SOLUTION INTRAVENOUS CONTINUOUS
Status: DISCONTINUED | OUTPATIENT
Start: 2019-10-22 | End: 2019-10-22 | Stop reason: HOSPADM

## 2019-10-22 RX ORDER — ROPIVACAINE HYDROCHLORIDE 2 MG/ML
INJECTION, SOLUTION EPIDURAL; INFILTRATION; PERINEURAL AS NEEDED
Status: DISCONTINUED | OUTPATIENT
Start: 2019-10-22 | End: 2019-10-22 | Stop reason: HOSPADM

## 2019-10-22 RX ORDER — EPHEDRINE SULFATE 50 MG/ML
INJECTION, SOLUTION INTRAVENOUS AS NEEDED
Status: DISCONTINUED | OUTPATIENT
Start: 2019-10-22 | End: 2019-10-22 | Stop reason: HOSPADM

## 2019-10-22 RX ORDER — TRANEXAMIC ACID 100 MG/ML
INJECTION, SOLUTION INTRAVENOUS AS NEEDED
Status: DISCONTINUED | OUTPATIENT
Start: 2019-10-22 | End: 2019-10-22 | Stop reason: HOSPADM

## 2019-10-22 RX ORDER — SODIUM CHLORIDE 0.9 % (FLUSH) 0.9 %
5-40 SYRINGE (ML) INJECTION EVERY 8 HOURS
Status: DISCONTINUED | OUTPATIENT
Start: 2019-10-22 | End: 2019-10-24 | Stop reason: HOSPADM

## 2019-10-22 RX ORDER — DEXAMETHASONE SODIUM PHOSPHATE 4 MG/ML
INJECTION, SOLUTION INTRA-ARTICULAR; INTRALESIONAL; INTRAMUSCULAR; INTRAVENOUS; SOFT TISSUE
Status: COMPLETED | OUTPATIENT
Start: 2019-10-22 | End: 2019-10-22

## 2019-10-22 RX ORDER — ACETAMINOPHEN 10 MG/ML
1000 INJECTION, SOLUTION INTRAVENOUS ONCE
Status: COMPLETED | OUTPATIENT
Start: 2019-10-22 | End: 2019-10-22

## 2019-10-22 RX ORDER — FAMOTIDINE 20 MG/1
20 TABLET, FILM COATED ORAL
Status: COMPLETED | OUTPATIENT
Start: 2019-10-22 | End: 2019-10-22

## 2019-10-22 RX ORDER — NALOXONE HYDROCHLORIDE 0.4 MG/ML
0.1 INJECTION, SOLUTION INTRAMUSCULAR; INTRAVENOUS; SUBCUTANEOUS
Status: DISCONTINUED | OUTPATIENT
Start: 2019-10-22 | End: 2019-10-22 | Stop reason: HOSPADM

## 2019-10-22 RX ORDER — MONTELUKAST SODIUM 10 MG/1
10 TABLET ORAL
Status: DISCONTINUED | OUTPATIENT
Start: 2019-10-22 | End: 2019-10-24 | Stop reason: HOSPADM

## 2019-10-22 RX ORDER — FUROSEMIDE 20 MG/1
20 TABLET ORAL DAILY
Status: DISCONTINUED | OUTPATIENT
Start: 2019-10-23 | End: 2019-10-24 | Stop reason: HOSPADM

## 2019-10-22 RX ORDER — FLUMAZENIL 0.1 MG/ML
0.2 INJECTION INTRAVENOUS
Status: DISCONTINUED | OUTPATIENT
Start: 2019-10-22 | End: 2019-10-22 | Stop reason: HOSPADM

## 2019-10-22 RX ORDER — LISINOPRIL 20 MG/1
20 TABLET ORAL DAILY
Status: DISCONTINUED | OUTPATIENT
Start: 2019-10-23 | End: 2019-10-24 | Stop reason: HOSPADM

## 2019-10-22 RX ORDER — ONDANSETRON 2 MG/ML
4 INJECTION INTRAMUSCULAR; INTRAVENOUS ONCE
Status: COMPLETED | OUTPATIENT
Start: 2019-10-22 | End: 2019-10-22

## 2019-10-22 RX ORDER — AMOXICILLIN 250 MG
2 CAPSULE ORAL DAILY
Status: DISCONTINUED | OUTPATIENT
Start: 2019-10-23 | End: 2019-10-24 | Stop reason: HOSPADM

## 2019-10-22 RX ADMIN — ONDANSETRON 4 MG: 2 INJECTION INTRAMUSCULAR; INTRAVENOUS at 09:43

## 2019-10-22 RX ADMIN — HYDROMORPHONE HYDROCHLORIDE 0.5 MG: 2 INJECTION, SOLUTION INTRAMUSCULAR; INTRAVENOUS; SUBCUTANEOUS at 11:30

## 2019-10-22 RX ADMIN — ZOLPIDEM TARTRATE 5 MG: 5 TABLET ORAL at 23:29

## 2019-10-22 RX ADMIN — MIDAZOLAM 1 MG: 1 INJECTION INTRAMUSCULAR; INTRAVENOUS at 08:54

## 2019-10-22 RX ADMIN — Medication 2 G: at 17:44

## 2019-10-22 RX ADMIN — TRANEXAMIC ACID 1 G: 100 INJECTION, SOLUTION INTRAVENOUS at 09:17

## 2019-10-22 RX ADMIN — EPHEDRINE SULFATE 10 MG: 50 INJECTION, SOLUTION INTRAVENOUS at 09:42

## 2019-10-22 RX ADMIN — Medication 2 G: at 09:29

## 2019-10-22 RX ADMIN — DEXAMETHASONE SODIUM PHOSPHATE 4 MG: 4 INJECTION, SOLUTION INTRA-ARTICULAR; INTRALESIONAL; INTRAMUSCULAR; INTRAVENOUS; SOFT TISSUE at 08:57

## 2019-10-22 RX ADMIN — SODIUM CHLORIDE 100 ML/HR: 900 INJECTION, SOLUTION INTRAVENOUS at 13:00

## 2019-10-22 RX ADMIN — CELECOXIB 200 MG: 200 CAPSULE ORAL at 08:32

## 2019-10-22 RX ADMIN — OXYCODONE HYDROCHLORIDE 10 MG: 5 TABLET ORAL at 15:57

## 2019-10-22 RX ADMIN — EPHEDRINE SULFATE 10 MG: 50 INJECTION, SOLUTION INTRAVENOUS at 09:36

## 2019-10-22 RX ADMIN — BUDESONIDE AND FORMOTEROL FUMARATE DIHYDRATE 2 PUFF: 160; 4.5 AEROSOL RESPIRATORY (INHALATION) at 21:23

## 2019-10-22 RX ADMIN — Medication 3 AMPULE: at 08:10

## 2019-10-22 RX ADMIN — FENTANYL CITRATE 50 MCG: 50 INJECTION, SOLUTION INTRAMUSCULAR; INTRAVENOUS at 09:12

## 2019-10-22 RX ADMIN — ASPIRIN 81 MG: 81 TABLET, COATED ORAL at 20:07

## 2019-10-22 RX ADMIN — SODIUM CHLORIDE, SODIUM LACTATE, POTASSIUM CHLORIDE, AND CALCIUM CHLORIDE: 600; 310; 30; 20 INJECTION, SOLUTION INTRAVENOUS at 09:59

## 2019-10-22 RX ADMIN — ACETAMINOPHEN 1000 MG: 10 INJECTION, SOLUTION INTRAVENOUS at 18:00

## 2019-10-22 RX ADMIN — SODIUM CHLORIDE, SODIUM LACTATE, POTASSIUM CHLORIDE, AND CALCIUM CHLORIDE 100 ML/HR: 600; 310; 30; 20 INJECTION, SOLUTION INTRAVENOUS at 08:12

## 2019-10-22 RX ADMIN — Medication 1 AMPULE: at 20:07

## 2019-10-22 RX ADMIN — ONDANSETRON 4 MG: 2 INJECTION INTRAMUSCULAR; INTRAVENOUS at 08:32

## 2019-10-22 RX ADMIN — ACETAMINOPHEN 1000 MG: 500 TABLET, FILM COATED ORAL at 23:29

## 2019-10-22 RX ADMIN — EPHEDRINE SULFATE 15 MG: 50 INJECTION, SOLUTION INTRAVENOUS at 09:54

## 2019-10-22 RX ADMIN — PROPOFOL 50 MCG/KG/MIN: 10 INJECTION, EMULSION INTRAVENOUS at 09:30

## 2019-10-22 RX ADMIN — Medication 10 ML: at 22:00

## 2019-10-22 RX ADMIN — ROPIVACAINE HYDROCHLORIDE 2 MG: 2 INJECTION, SOLUTION EPIDURAL; INFILTRATION; PERINEURAL at 08:57

## 2019-10-22 RX ADMIN — HYDROMORPHONE HYDROCHLORIDE 0.5 MG: 2 INJECTION, SOLUTION INTRAMUSCULAR; INTRAVENOUS; SUBCUTANEOUS at 11:25

## 2019-10-22 RX ADMIN — OXYCODONE HYDROCHLORIDE 10 MG: 5 TABLET ORAL at 20:10

## 2019-10-22 RX ADMIN — ACETAMINOPHEN 1000 MG: 500 TABLET, FILM COATED ORAL at 08:32

## 2019-10-22 RX ADMIN — MONTELUKAST 10 MG: 10 TABLET, FILM COATED ORAL at 20:07

## 2019-10-22 RX ADMIN — FENTANYL CITRATE 50 MCG: 50 INJECTION INTRAMUSCULAR; INTRAVENOUS at 08:54

## 2019-10-22 RX ADMIN — CELECOXIB 200 MG: 200 CAPSULE ORAL at 20:07

## 2019-10-22 RX ADMIN — FAMOTIDINE 20 MG: 20 TABLET ORAL at 08:32

## 2019-10-22 RX ADMIN — FENTANYL CITRATE 50 MCG: 50 INJECTION, SOLUTION INTRAMUSCULAR; INTRAVENOUS at 09:16

## 2019-10-22 NOTE — PROGRESS NOTES
10/22/19 1404   Oxygen Therapy   O2 Sat (%) 95 %   Pulse via Oximetry 73 beats per minute   O2 Device Nasal cannula   O2 Flow Rate (L/min) 3 l/min  (weaned to room air)   Incentive Spirometry Treatment   Actual Volume (ml) 1250 ml   Number of Attempts 1    No shortness of breath or distress noted. BS are clear b/l.    [facility] notes reviewed- continuous sat # 21 ordered HS

## 2019-10-22 NOTE — OP NOTES
[facility] [place]  Cemented Total Knee Arthroplasty  Patient:Angelita Manning   : 1945  Medical Record [id number]  Pre-operative Diagnosis:  Unilateral primary osteoarthritis, left knee [M17.12]  Post-operative Diagnosis: Unilateral primary osteoarthritis, left knee [M17.12]    Surgeon: Geovanna Rojas MD  Assistant: Alexa Nava PA-C    Anesthesia: Spinal    Procedure: Total Knee Arthroplasty with use of Bone Cement  The complexity of the total joint surgery requires the use of a first assistant for positioning, retraction and assistance in closure. The patient's Body mass index is 34.11 kg/m²., BMI's greater then 40 make surgical exposure and retraction extremely difficult and increase operative time. Tourniquet Time: none  EBL: 150cc  Additional Findings: Severe Valgus DJD/ Severe Osteoporosis   Releases none    Angelita Manning was brought to the operating room and positioned on the operating table. She was anethestized  IV antibiotics were administered per CMS protocol. Prior to the incision being made a timeout was called identifying the patient, procedure ,operative side and surgeon. The left leg was prepped and draped in the usual sterile manner  An anterior longitudinal incision was accomplished just medial to the tibial tubercle and extending approximal 6 centimeters proximal to the superior pole of the patella. A medial parapatellar capsular incision was performed. The medial capsular flap was elevated around to the insertion of the semimembranous tendon. The patella was everted and the knee flexed and externally rotated. The medial and external menisci were excised. The lateral half of the fat pad excised and the patella femoral ligament was released. The anterior cruciate ligament was resected and the posterior cruciate ligament was substituted. Using extramedullary instrumentation, the tibial cut was accomplished with appropriate posterior slope. Approxiamately 2 mm of bone was removed from the low side of the tibia. The distal femur was next addressed. A drill hole was made above the intracondylar notch. Using appropriate intramedullary instrumentation,a 5 degree valgus distal cut was accomplished. A femur was sized. The anterior and posterior cuts were then made about the distal femur. The osteophytes were removed from the tibial and femoral surfaces. The flexion and extension gaps were assessed with the appropriate spacer blocks. Additional surgical procedures included none. The flexion and extension gaps were deemed appropriately balanced. The appropriate cutting blocks were then utilized to perform the anterior chamfer, posterior chamfer and notch cuts, with appropriate lateral tranlation accomplished for the patellofemoral groove. The tibia was sized. The tibial base plate was pinned into place with the appropriate external rotation and stem site prepared. Given the patient's poor bone quality, a short tibial stem extension was placed to augment fixation. A preliminary range of motion was accomplished with the above size trial components. A polyethylene insert allowed the patient to obtain full extension as well as appropriate flexion. The patient's ligaments were stable in flexion and extension to medial and lateral stressing and the alignment was through the appropriate mechanical axis. The patella was then everted. The bone was resected appropriately for a pegged patella button. A trial reduction revealed appropriate tracking through the patellofemoral groove. All trial components were removed and the cut surfaces prepared for cementing with irrigation and debridement of the bone interstices. The implants were cemented into position and pressurized in the usual fashion. Bone and cement debris were meticulously removed. The betadine lavage protocol was used.     [facility] knee was placed through range of motion and noted to be stable as mentioned above with the trail components. The wound was dry, therefore no drain was used. The operative knee was injected with 60cc of Naropin, 10 cc's of morphine and 1 cc of 30mg of Toradol. The capsular layer was closed using a #1 vicryl suture, while subcutaneous layers were closed using 2-0 Vicryl interrupted sutures and a #1 Stratofix. Finally the skin was closed using 3-0 Vicryl and a Zipline closure. A sterile sterile bandage was applied. An Iceman cryo pad was applied on the operative leg. Sponge count and needle counts were correct. Angelita Mandujano left the operating room     Implants:   Implant Name Type Inv.  Item Serial No.  Lot No. LRB No. Used   CEMENT BNE SIMPLEX W/O GENT -- PK/10 ONLY - [id number]  CEMENT BNE SIMPLEX W/O GENT -- PK/10 ONLY [id number] [employer] [facility] [id number] Left 2   FEM PS SZ 5 LT MARY -- ATTUNE - [id number]  FEM PS SZ 5 LT MARY -- ATTUNE [id number] [facility] [employer] [id number] Left 1   STEM MARY [id number] -- ATTUNE - [id number]  STEM MARY [id number] -- ATTUNE [id number] [facility] [employer] [id number] Left 1   TIB CRS FB BASE SZ 5 MARY -- ATTUNE - [id number]  TIB CRS FB BASE SZ 5 MARY -- ATTUNE [id number] [facility] [employer] [id number] Left 1   attune patella medialized anatomic   [id number] [employer] [id number] Left 1   INSERT TIB FB PS SZ 5 6MM -- ATTUNE - [id number]  INSERT TIB FB PS SZ 5 6MM -- ATTUNE [id number] [facility] [employer] [id number] Left 1     Signed By: Jamie Peters MD

## 2019-10-22 NOTE — PROGRESS NOTES
Problem: Self Care Deficits Care Plan (Adult)  Goal: *Acute Goals and Plan of Care (Insert Text)  Description  GOALS:   DISCHARGE GOALS (in preparation for going home/rehab):  3 days  1. Ms. Camilla Armstrong will perform one lower body dressing activity with minimal assistance required to demonstrate improved functional mobility and safety. 2.  Ms. Camilla Armstrong will perform one lower body bathing activity with minimal assistance required to demonstrate improved functional mobility and safety. 3.  Ms. Camilla Armstrong will perform toileting/toilet transfer with contact guard assistance to demonstrate improved functional mobility and safety. 4.  Ms. Camilla Armstrong will perform shower transfer with contact guard assistance to demonstrate improved functional mobility and safety. Outcome: Progressing Towards Goal     JOINT CAMP OCCUPATIONAL THERAPY TKA: Initial Assessment and PM 10/22/2019  INPATIENT: Hospital Day: 1  Payor: Roseline [place] / Plan: Via T[employer] 21 / Product Type: Let Care Medicare /      NAME/AGE/GENDER: Tony Gerard is a 76 y.o. female   PRIMARY DIAGNOSIS:  Unilateral primary osteoarthritis, left knee [M17.12]   Procedure(s) and Anesthesia Type:     * LEFT KNEE ARTHROPLASTY TOTAL / Meliza Fiddler - Spinal (Left)  ICD-10: Treatment Diagnosis:    Pain in Left Knee (M25.562)  Stiffness of Left Knee, Not elsewhere classified (M25.662)  Generalized Muscle Weakness (M62.81)  Other lack of cordination (R27.8)      ASSESSMENT:     Ms. Camilla Armstrong is s/p left TKA and presents with decreased weight bearing on left LE and decreased independence with functional mobility and activities of daily living as compared to baseline level of function and safety. Patient would benefit from skilled Occupational Therapy to maximize independence and safety with self-care task and functional mobility. Pt would also benefit from education on adaptive equipment and safety precautions in preparation for going home.       This section established at most recent assessment   PROBLEM LIST (Impairments causing functional limitations):  Decreased Strength  Decreased ADL/Functional Activities  Decreased Transfer Abilities  Increased Pain  Increased Fatigue  Decreased Flexibility/Joint Mobility  Decreased Knowledge of Precautions   INTERVENTIONS PLANNED: (Benefits and precautions of occupational therapy have been discussed with the patient.)  Activities of daily living training  Adaptive equipment training  Balance training  Clothing management  Donning&doffing training  Theraputic activity     TREATMENT PLAN: Frequency/Duration: Follow patient 1-2 times to address above goals. Rehabilitation Potential For Stated Goals: Good     RECOMMENDED REHABILITATION/EQUIPMENT: (at time of discharge pending progress): Continue Skilled Therapy. OCCUPATIONAL PROFILE AND HISTORY:   History of Present Injury/Illness (Reason for Referral): Pt presents this date s/p (left) TKA.     Past Medical History/Comorbidities:   Ms. 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 (8/16/2011), Anemia (10/13/2010), Anticoagulant long-term use (06/08/2016), Anxiety and depression (6/23/2009), Arthritis, Asthma, [facility]rial [facility] (2012), Atrophic vaginitis (5/2/2012), B12 deficiency (11/17/2010), Blood glucose elevated (2/3/2009), Chest pain, Chronic pain, Dysfunctional uterine bleeding (5/2/2012), Generalized anxiety disorder (10/21/2016), GERD (gastroesophageal reflux disease) (11/08/2012), High risk medication use (10/21/2016), HLD (hyperlipidemia) (2/3/2009), HTN (hypertension), Hypothyroidism (10/12/2009), IBS (irritable bowel syndrome) (7/5/2011), Insomnia (3/17/2010), Leaky heart valve, Leukocytosis (1/15/2014), LVH (left ventricular hypertrophy) (1/13/2009), Microscopic hematuria (4/16/2014), Obesity, Osteopenia (1/15/2014), Pernicious anemia (1/15/2014), Pneumonia (12/30/2009), Positive occult stool blood test (10/13/2010), Skin lesion (8/16/2011), Thrombocytopenia ([address] (2/28/2013), Unspecified sleep apnea, Urinary frequency (3/28/2013), and Vertigo (12/9/2010). She also has no past medical history of Aneurysm ([address] Autoimmune disease ([address] CAD (coronary artery disease), Cancer ([address] Chronic kidney disease, Chronic obstructive pulmonary disease ([address] Coagulation disorder ([address] Diabetes ([address] Difficult intubation, Endocarditis, Heart failure ([address] Liver disease, Malignant hyperthermia due to anesthesia, Nausea & vomiting, Nicotine vapor product user, Non-nicotine vapor product user, Pseudocholinesterase deficiency, PUD (peptic ulcer disease), Rheumatic fever, Seizures ([address] Stroke ([address] or Thromboembolus ([address] Ms. Angel Bond  has a past surgical history that includes pr cardiac surg procedure unlist; hx colonoscopy (2010); and us guided core breast biopsy (Right, 1966). Social History/Living Environment:   Home Environment: Private residence  Patient Expects to be Discharged to[de-identified] Other (comment)(OR)  Tub or Shower Type: Shower  Prior Level of Function/Work/Activity:  Mod I with ADLS lives alone     Number of Personal Factors/Comorbidities that affect the Plan of Care: Brief history (0):  LOW COMPLEXITY   ASSESSMENT OF OCCUPATIONAL PERFORMANCE[de-identified]   Most Recent Physical Functioning:   Balance  Sitting: Intact  Standing: Pull to stand; With support                    Coordination  Fine Motor Skills-Upper: Left Intact; Right Intact  Gross Motor Skills-Upper: Left Intact; Right Intact         Mental Status  Neurologic State: Alert; Appropriate for age  Orientation Level: Appropriate for age  Cognition: Appropriate decision making; Appropriate for age attention/concentration; Appropriate safety awareness; Follows commands  Perception: Appears intact  Perseveration: No perseveration noted  Safety/Judgement: Awareness of environment; Fall prevention                Basic ADLs (From Assessment) Complex ADLs (From Assessment)   Basic ADL  Feeding: Supervision  Oral Facial Hygiene/Grooming: Supervision  Bathing: Moderate assistance  Upper Body Dressing: Supervision  Lower Body Dressing: Moderate assistance  Toileting: Moderate assistance     Grooming/Bathing/Dressing Activities of Daily Living     Cognitive Retraining  Safety/Judgement: Awareness of environment; Fall prevention                 Functional Transfers  Toilet Transfer : Minimum assistance; Moderate assistance  Shower Transfer: Minimum assistance; Moderate assistance     Bed/Mat Mobility  Supine to Sit: Minimum assistance  Sit to Stand: Minimum assistance  Stand to Sit: Minimum assistance  Bed to Chair: Minimum assistance  Scooting: Minimum assistance         Physical Skills Involved:  Range of Motion  Balance  Strength Cognitive Skills Affected (resulting in the inability to perform in a timely and safe manner):  [facility]  Psychosocial Skills Affected:  none    Number of elements that affect the Plan of Care: 1-3:  LOW COMPLEXITY   CLINICAL DECISION MAKING:   [facility] [facility] 6 Clicks   Daily Activity Inpatient Short Form  How much help from another person does the patient currently need. .. Total A Lot A Little None   1. Putting on and taking off regular lower body clothing? ? 1   ? 2   ? 3   ? 4   2. Bathing (including washing, rinsing, drying)? ? 1   ? 2   ? 3   ? 4   3. Toileting, which includes using toilet, bedpan or urinal?   ? 1   ? 2   ? 3   ? 4   4. Putting on and taking off regular upper body clothing? ? 1   ? 2   ? 3   ? 4   5. Taking care of personal grooming such as brushing teeth? ? 1   ? 2   ? 3   ? 4   6. Eating meals? ? 1   ? 2   ? 3   ? 4   © 2007, Trustees of [employer], under license to [employer]. All rights reserved     Score:  Initial: 18 Most Recent: X (Date: -- )    Interpretation of Tool:  Represents activities that are increasingly more difficult (i.e. Bed mobility, Transfers, Gait).     Medical Necessity:     Patient is expected to demonstrate progress in balance, coordination and functional technique   to decrease assistance required with self care skills and transfers   . Reason for Services/Other Comments:  Patient continues to require skilled intervention due to s/p L TKA   . Use of outcome tool(s) and clinical judgement create a POC that gives a: LOW COMPLEXITY            TREATMENT:   (In addition to Assessment/Re-Assessment sessions the following treatments were rendered)     Pre-treatment Symptoms/Complaints:    Pain: Initial:   Pain Intensity 1: 3  Pain Location 1: Knee  Pain Orientation 1: Left  Pain Intervention(s) 1: Ambulation/Increased Activity  Post Session:  3/10 rest, iceman in place     Assessment/Reassessment only, no treatment provided today    Treatment/Session Assessment:     Response to Treatment:  tolerated well, reported she needs a lot of encouragement. Education:  ? Home Exercises  ? Fall Precautions  ? Hip Precautions ? Going Home Video  ? Knee/Hip Prosthesis Review  ? Walker Management/Safety ? Adaptive Equipment as Needed       Interdisciplinary Collaboration:   Physical Therapist  Occupational Therapist  Registered Nurse    After treatment position/precautions:   Up in chair  Bed/Chair-wheels locked  Caregiver at bedside  Call light within reach  RN notified  Family at bedside     Compliance with Program/Exercises: Compliant all of the time. Recommendations/Intent for next treatment session:  Treatment next visit will focus on increasing Ms. Chung's independence with bed mobility, transfers, self care, functional mobility, modalities for pain, and patient education.       Total Treatment Duration:  OT Patient Time In/Time Out  Time In: 1255  Time Out: Ivette Silver 76, OT

## 2019-10-22 NOTE — PERIOP NOTES
TRANSFER - OUT REPORT:    Verbal report given to Tyrel Blunt RN (name) on Gaby Frizzle  being transferred to room 314 (unit) for routine post - op       Report consisted of patients Situation, Background, Assessment and   Recommendations(SBAR). Information from the following report(s) SBAR, Kardex, OR Summary, Intake/Output and MAR was reviewed with the receiving nurse. Lines:   Peripheral IV 10/22/19 Right Hand (Active)   Site Assessment Clean, dry, & intact 10/22/2019  7:43 AM   Phlebitis Assessment 0 10/22/2019  7:43 AM   Infiltration Assessment 0 10/22/2019  7:43 AM   Dressing Status Clean, dry, & intact 10/22/2019  7:43 AM   Dressing Type Tape;Transparent 10/22/2019  7:43 AM   Hub Color/Line Status Pink; Infusing 10/22/2019  7:43 AM   Action Taken Blood drawn 10/22/2019  7:43 AM        Opportunity for questions and clarification was provided.       Patient transported with:   [employer]2 @ 3 liters  [employer]

## 2019-10-22 NOTE — PERIOP NOTES
TRANSFER - OUT REPORT:    Verbal report given to HUBER RN(name) on Eugenia Gomez  being transferred to PRE-OP(unit) for routine progression of care       Report consisted of patients Situation, Background, Assessment and   Recommendations(SBAR). Information from the following report(s) SBAR, MAR and Recent Results was reviewed with the receiving nurse. Lines:   Peripheral IV 10/22/19 Right Hand (Active)   Site Assessment Clean, dry, & intact 10/22/2019  7:43 AM   Phlebitis Assessment 0 10/22/2019  7:43 AM   Infiltration Assessment 0 10/22/2019  7:43 AM   Dressing Status Clean, dry, & intact 10/22/2019  7:43 AM   Dressing Type Tape;Transparent 10/22/2019  7:43 AM   Hub Color/Line Status Pink; Infusing 10/22/2019  7:43 AM   Action Taken Blood drawn 10/22/2019  7:43 AM        Opportunity for questions and clarification was provided.       Patient transported with:   [employer]

## 2019-10-22 NOTE — PROGRESS NOTES
Patient arrived via bed into room. Patient alert and oriented. Patient able to move dosi/flex extremities due to surgery. Pedal pulses present 2+. SCD applied. Neurovascular status WNL. Has not voided at this time. Admission assessment complete. Discuss diet and pain. Bed is low and locked. Call light within reach. Instructed to call for assistance. Family at bedside.

## 2019-10-22 NOTE — INTERVAL H&P NOTE
H&P Update: 
Angelita Hong was seen and examined. History and physical has been reviewed. The patient has been examined.  There have been no significant clinical changes since the completion of the originally dated History and Physical.

## 2019-10-22 NOTE — CONSULTS
Consult    Patient: Denia Platt MRN: [id number]  SSN: [id number]    YOB: 1945  Age: 76 y.o. Sex: female      Subjective:      Angelita Sexton is a 76 y.o. female who is being seen for medical management. The patient Is sp TKA secondary to knee OA. She had no complications. She does have a hx of a fib on xarelto, sp ablation, asthma, HTN, hypothyroidism. She has been compliant to all her medications. Her pain is controlled. Denies sob, chest pain. Past Medical History:   Diagnosis Date    Acquired stenosis of external ear canal unspecified as to cause 8/16/2011    Anemia 10/13/2010    Anticoagulant long-term use 06/08/2016    for AF, xarelto    Anxiety and depression 6/23/2009    Arthritis     osteo    Asthma     Symbicort daily and pro-air as needed, No hospitalizations     Atrial fibrillation ([address] 2012    AF despite flecainide and then amiodarone. AF ablation 2017    Atrophic vaginitis 5/2/2012    B12 deficiency 11/17/2010    Blood glucose elevated 2/3/2009    Chest pain     11/2008 stress nuclear: normal: 12/2011 stress nuclear: normal; 12/2014 cardiac CT: no significant calcium or obstructive CAD. Pt denies CP currrently.      Chronic pain     Dysfunctional uterine bleeding 5/2/2012    Generalized anxiety disorder 10/21/2016    GERD (gastroesophageal reflux disease) 11/08/2012    managed with meds     High risk medication use 10/21/2016    HLD (hyperlipidemia) 2/3/2009    HTN (hypertension)     controlled w/med    Hypothyroidism 10/12/2009    stable w/med daily    IBS (irritable bowel syndrome) 7/5/2011    Insomnia 3/17/2010    Leaky heart valve     Found on a Echo     Leukocytosis 1/15/2014    LVH (left ventricular hypertrophy) 1/13/2009    Microscopic hematuria 4/16/2014    Obesity     Osteopenia 1/15/2014    Pernicious anemia 1/15/2014    Pneumonia 12/30/2009    Positive occult stool blood test 10/13/2010    Skin lesion 8/16/2011  Thrombocytopenia ([address] 2/28/2013    Unspecified sleep apnea     Does not wear a c-pap     Urinary frequency 3/28/2013    Vertigo 12/9/2010     Past Surgical History:   Procedure Laterality Date    CARDIAC SURG PROCEDURE UNLIST      ablation    HX COLONOSCOPY  2010    polyps    US GUIDED CORE BREAST BIOPSY Right 1966      Family History   Problem Relation Age of Onset    Diabetes Mother     Hypertension Mother     Asthma Father     Diabetes Brother     Breast Cancer Neg Hx      Social History     Tobacco Use    Smoking status: Never Smoker    Smokeless tobacco: Never Used   Substance Use Topics    Alcohol use: No      Current Facility-Administered Medications   Medication Dose Route Frequency Provider Last Rate Last Dose    albuterol (PROVENTIL VENTOLIN) nebulizer solution 2.5 mg  2.5 mg Nebulization Q4H PRN Deon Almanza MD        budesonide-formoterol (SYMBICORT) 160-4.5 mcg/actuation HFA inhaler 2 Puff   (Patient Supplied)  2 Puff Inhalation BID RT MD Teresa Costa ON 10/23/2019] dilTIAZem CD (CARDIZEM CD) capsule 240 mg  240 mg Oral DAILY Deon Almanza MD        [START ON 10/23/2019] furosemide (LASIX) tablet 20 mg  20 mg Oral DAILY Deon Almanza MD        [START ON 10/23/2019] levothyroxine (SYNTHROID) tablet 112 mcg  112 mcg Oral MACKENZIE Almanza MD        [START ON 10/23/2019] lisinopril (PRINIVIL, ZESTRIL) tablet 20 mg  20 mg Oral DAILY Deon Almanza MD        montelukast (SINGULAIR) tablet 10 mg  10 mg Oral QHS Deon Almanza MD        [START ON 10/23/2019] pantoprazole (PROTONIX) tablet 40 mg  40 mg Oral ACB Deon Almanza MD        0.9% sodium chloride infusion  100 mL/hr IntraVENous CONTINUOUS Deon Almanza  mL/hr at 10/22/19 1300 100 mL/hr at 10/22/19 1300    sodium chloride (NS) flush 5-40 mL  5-40 mL IntraVENous Q8H Deon Almanza MD        sodium chloride (NS) flush 5-40 mL  5-40 mL IntraVENous PRN Miladis Lennon MD        ceFAZolin (ANCEF) 2 g/20 mL in sterile water IV syringe  2 g IntraVENous Q8H Miladis Lennon MD        acetaminophen (OFIRMEV) infusion 1,000 mg  1,000 mg IntraVENous ONCE Miladis Lennon MD        [START ON 10/23/2019] acetaminophen (TYLENOL) tablet 1,000 mg  1,000 mg Oral Q6H Miladis Lennon MD        celecoxib (CELEBREX) capsule 200 mg  200 mg Oral Q12H Miladis Lennon MD        oxyCODONE IR (ROXICODONE) tablet 5-10 mg  5-10 mg Oral Q4H PRN Miladis Lennon MD   10 mg at 10/22/19 1557    HYDROmorphone (PF) (DILAUDID) injection 1 mg  1 mg IntraVENous Q3H PRN Miladis Lennon MD        naloxone [facility]) injection 0.2-0.4 mg  0.2-0.4 mg IntraVENous Q10MIN PRN Miladis Lennon MD        promethazine (PHENERGAN) tablet 25 mg  25 mg Oral Q6H PRN Miladis Lennon MD        diphenhydrAMINE (BENADRYL) capsule 25 mg  25 mg Oral Q4H PRN Miladis Lennon MD        [START ON 10/23/2019] senna-docusate (PERICOLACE) 8.6-50 mg per tablet 2 Tab  2 Tab Oral DAILY Miladis Lennon MD        aspirin delayed-release tablet 81 mg  81 mg Oral Q12H Miladis Lennon MD        ondansetron (ZOFRAN ODT) tablet 8 mg  8 mg Oral Q8H PRN Miladis Lennon MD        alcohol 62% (NOZIN) nasal  1 Ampule  1 Ampule Topical Q12H Miladis Lennon MD        zolpidem (AMBIEN) tablet 5 mg  5 mg Oral QHS PRN Miladis Lennon MD            Allergies   Allergen Reactions    Betamethasone Anxiety     Jittery  Jittery    Hydrochlorothiazide Other (comments)     Interaction with Tikosyn    Albuterol Other (comments)     tachycardia/palpitations    Allegra-D 12 Hour [Fexofenadine-Pseudoephedrine] Other (comments)    Bactrim [Sulfamethoprim Ds] Other (comments)     Interaction with Tikosyn    Ciprofloxacin Other (comments)     Interaction with Tikosyn    Claritin-D 12 Hour [Loratadine-Pseudoephedrine] Other (comments) Interaction with Tikosyn    Cortisone Other (comments)     Extreme redness to face    Cymbalta [Duloxetine] Other (comments)     FOGGY    Effexor [Venlafaxine] Nausea Only    Lexapro [Escitalopram] Nausea Only    Pristiq [Desvenlafaxine] Other (comments)     Affected her mental abilities    Rozerem [Ramelteon] Other (comments)     SPACEY      Zoloft [Sertraline] Shortness of Breath    Zyrtec-D [Cetirizine-Pseudoephedrine] Other (comments)     Interaction with Tikosyn       Review of Systems:  A comprehensive review of systems was negative except for that written in the History of Present Illness. Objective:     Vitals:    10/22/19 1225 10/22/19 1404 10/22/19 1539 10/22/19 1653   BP: 103/55  123/74    Pulse: 74  72    Resp: 16  16    Temp: 97.6 °F (36.4 °C)  97.4 °F (36.3 °C)    SpO2: 96% 95% 94% 94%   Weight:       Height:            Physical Exam:  GENERAL: alert, cooperative, no distress, appears stated age  EYE: negative  LYMPHATIC: Cervical, supraclavicular, and axillary nodes normal.   THROAT & NECK: normal and no erythema or exudates noted. LUNG: clear to auscultation bilaterally  HEART: regular rate and rhythm, S1, S2 normal, no murmur, click, rub or gallop  ABDOMEN: soft, non-tender. Bowel sounds normal. No masses,  no organomegaly  Extremities: left knee with brace, mild edema   SKIN: Normal.  NEUROLOGIC: negative  PSYCHIATRIC: non focal    Assessment:     Hospital Problems  Date Reviewed: 9/17/2019          Codes Class Noted POA    Arthritis of knee, left ICD-10-CM: M17.12  ICD-9-CM: 716.96  10/22/2019 Unknown        Anticoagulant long-term use ICD-10-CM: Z79.01  ICD-9-CM: V58.61  6/8/2016 Yes        Asthma ICD-10-CM: J45.909  ICD-9-CM: 493.90  1/7/2016 Yes    Overview Addendum 1/9/2017  4:31 PM by Ozzy Kelsey NP     Last Assessment & Plan:   Asthma appears to be doing well with control of her triggers. She has  Understanding of her triggers in her medications.  I will see her in 15 months with a complete pulmonary function test  Last Assessment & Plan: We had another educational session about her asthma. She has done a good job of removing triggers from her environment. She still has the cat but is keeping it out of the bedroom. I instructed her to obtain a good air-filled her. She no longer wears perfume and is noticed a benefit. She is using her maintenance medication Qvar correctly. I instructed her to use her rescue inhaler more often. Hypothyroidism ICD-10-CM: E03.9  ICD-9-CM: 244.9  12/30/2014 Yes        Atrial fibrillation ([address] ICD-10-CM: I48.91  ICD-9-CM: 427.31  12/4/2014 Yes    Overview Signed 3/19/2015  5:37 PM by Kosta Churchill NP     Due to Tikosyn. Multiple med interactions. Can take PCN, cephalosporins, plain claritin and plain zyrtec. Hypertension ICD-10-CM: I10  ICD-9-CM: 401.9  12/4/2014 Yes        GERD (gastroesophageal reflux disease) ICD-10-CM: K21.9  ICD-9-CM: 530.81  11/8/2012 Yes              Plan:   -post operative management:  Hx asthma: duonebs prn, symbicort bid   Hx hypothyroid: resume levothyroxine  Hx A fib: on diltiazem. Holding [address] due to recent surgery. Defer to orthopedics when to resume. Hx HTN: already on home meds: lisinopril. Holding lasix until tomorrow am  Insomnia: resume ambien     -sp left TKA  Continue pain control by orthopedics  Stool softeners  Already on room air  Continue incentive spirometer  PT OT    DVT ppx: compression stockings    Disposition: STR  CM on board    Thank you for allowing us to participate in the care of this patient. We will follow along with you.      Signed By: Mario Leiva MD     October 22, 2019

## 2019-10-22 NOTE — PROGRESS NOTES
[facility]  Face to Face Encounter    Patients Name: Caridad Feng    YOB: 1945    Ordering Physician: Rashad Farfan    Primary Diagnosis: Unilateral primary osteoarthritis, left knee [M17.12]  Arthritis of knee, left [M17.12]  S/p left TKA    Date of Face to Face:   10/22/2019                                  Face to Face Encounter findings are related to primary reason for home care:   yes. 1. I certify that the patient needs intermittent care as follows: physical therapy: gait/stair training    2. I certify that this patient is homebound, that is: 1) patient requires the use of a walker device, special transportation, or assistance of another to leave the home; or 2) patient's condition makes leaving the home medically contraindicated; and 3) patient has a normal inability to leave the home and leaving the home requires considerable and taxing effort. Patient may leave the home for infrequent and short duration for medical reasons, and occasional absences for non-medical reasons. Homebound status is due to the following functional limitations: Patient's ambulation limited secondary to severe pain and requires the use of an assistive device and the assistance of a caregiver for safe completion. Patient with strength and ROM deficits limiting ambulation endurance requiring the use of an assistive device and the assistance of a caregiver. Patient deemed temporarily homebound secondary to increased risk for infection when leaving home and going out into the community. 3. I certify that this patient is under my care and that I, or a nurse practitioner or [phone], or clinical nurse specialist, or certified nurse midwife, working with me, had a Face-to-Face Encounter that meets the physician Face-to-Face Encounter requirements.   The following are the clinical findings from the [address] [place] [address] encounter that support the need for skilled services and is a summary of the encounter: see hospital chart        Marcelle Arroyoam, FELICITAS  10/22/2019      THE FOLLOWING TO BE COMPLETED BY THE COMMUNITY PHYSICIAN:    I concur with the findings described above from the F2F encounter that this patient is homebound and in need of a skilled service.     Certifying Physician: _____________________________________      Printed Certifying Physician Name: _____________________________________    Date: _________________

## 2019-10-22 NOTE — ANESTHESIA PREPROCEDURE EVALUATION
Anesthetic History               Review of Systems / Medical History  Patient summary reviewed and pertinent labs reviewed    Pulmonary        Sleep apnea: No treatment           Neuro/Psych         Psychiatric history (Anxiety and Depression)     Cardiovascular    Hypertension: well controlled        Dysrhythmias (Refractory atrial fibrillation. Taken off Impermium. Now on Flecanide.)       Exercise tolerance: <4 METS: Limited by movement more than cardiopulmonary fitness. Comments: Pt had stress test 9/2019 showing evidence of 2 reversible defects (one moderate in basal/mid inferior region and one mild in mid anterior location) with EF 76%. Subsequently followed up with cardiologist earlier this month how thought no other cardiac tests needed to be performed prior to her TKA. On Xarelto for stroke prophylaxis secondary to her AFib, which she has held for over 72 hours per recommendation of her cardiologist.   GI/Hepatic/Renal     GERD: well controlled           Endo/Other      Hypothyroidism: well controlled  Obesity     Other Findings              Physical Exam    Airway  Mallampati: II  TM Distance: 4 - 6 cm  Neck ROM: normal range of motion   Mouth opening: Normal     Cardiovascular    Rhythm: regular  Rate: normal        Comments: Presently in [facility]         Pulmonary  Breath sounds clear to auscultation               Abdominal  GI exam deferred       Other Findings            Anesthetic Plan    ASA: 3  Anesthesia type: spinal      Post-op pain plan if not by surgeon: peripheral nerve block single      Anesthetic plan and risks discussed with: Patient      Her multiple drug allergies listed in chart are drug interactions with Tikosyn, which she is no longer taking.

## 2019-10-22 NOTE — PERIOP NOTES
Teach back method used in review of Hibiclens usage preop/postop, TB screening, pain management goals, falls precautions and use of Nozin for prevention of staph infections. Incentive spirometer reviewed and pt reached TOTAL TIDAL VOLUME 1750 ML OBSERVED   in preop holding.

## 2019-10-22 NOTE — ANESTHESIA POSTPROCEDURE EVALUATION
Procedure(s):  LEFT KNEE ARTHROPLASTY TOTAL / Kinga Hitesh.     spinal    Anesthesia Post Evaluation      Multimodal analgesia: multimodal analgesia used between 6 hours prior to anesthesia start to PACU discharge  Patient location during evaluation: PACU  Patient participation: complete - patient participated  Level of consciousness: awake  Pain management: adequate  Airway patency: patent  Anesthetic complications: no  Cardiovascular status: acceptable  Respiratory status: spontaneous ventilation and acceptable  Hydration status: acceptable  Post anesthesia nausea and vomiting:  none      Vitals Value Taken Time   BP 95/52 10/22/2019 11:55 AM   Temp 36.8 °C (98.3 °F) 10/22/2019 11:10 AM   Pulse 82 10/22/2019 11:55 AM   Resp 16 10/22/2019 11:55 AM   SpO2 92 % 10/22/2019 11:55 AM

## 2019-10-22 NOTE — ANESTHESIA PROCEDURE NOTES
Spinal Block    Start time: 10/22/2019 9:15 AM  End time: 10/22/2019 9:24 AM  Performed by: Timothy Rosas MD  Authorized by: Timothy Rosas MD     Pre-procedure:   Indications: primary anesthetic  Preanesthetic Checklist: patient identified, risks and benefits discussed, anesthesia consent, patient being monitored and timeout performed    Timeout Time: 09:14          Spinal Block:   Patient Position:  Seated  Prep Region:  Lumbar  Prep: chlorhexidine and patient draped      Location:  L3-4  Technique:  Single shot    Local Dose (mL):  2    Needle:   Needle Type:  Pencan  Needle Gauge:  25 G  Attempts:  1      Events: CSF confirmed, no blood with aspiration and no paresthesia        Assessment:  Insertion:  Uncomplicated  Patient tolerance:  Patient tolerated the procedure well with no immediate complications

## 2019-10-22 NOTE — PROGRESS NOTES
Problem: Mobility Impaired (Adult and Pediatric)  Goal: *Acute Goals and Plan of Care (Insert Text)  Description  GOALS (1-4 days):  (1.)Ms. Karly Granger will move from supine to sit and sit to supine  in bed with INDEPENDENT. (2.)Ms. Karly Granger will transfer from bed to chair and chair to bed with INDEPENDENT using the least restrictive device. (3.)Ms. Chung will ambulate with INDEPENDENT for 250 feet with the least restrictive device. (4.)Ms. Chung will ambulate up/down 3 steps with bilateral  railing with MINIMAL ASSIST with no device. (5.)Ms. Chung will increase left knee ROM to 5°-80°.   ________________________________________________________________________________________________     Outcome: Progressing Towards Goal       PHYSICAL THERAPY JOINT CAMP TKA: Initial Assessment 10/22/2019  INPATIENT: Hospital Day: 1  Payor: Cesar Guerrero / Plan: Woody Schirmer / Product Type: Managed Care Medicare /      NAME/AGE/GENDER: Ilan Elmore is a 76 y.o. female   PRIMARY DIAGNOSIS:  Unilateral primary osteoarthritis, left knee [M17.12]   Procedure(s) and Anesthesia Type:     * LEFT KNEE ARTHROPLASTY TOTAL / Humaria Burton - Spinal (Left)  ICD-10: Treatment Diagnosis:    Pain in Left Knee (M25.562)  Stiffness of Left Knee, Not elsewhere classified (M25.662)  Difficulty in walking, Not elsewhere classified (R26.2)  Other abnormalities of gait and mobility (R26.89)      ASSESSMENT:     Ms. Karly Granger presents with decreased independence with functional mobility. Pt performed exercises in supine. Pt performed supine to sit to stand. Pt took steps to bedside chair.      This section established at most recent assessment   PROBLEM LIST (Impairments causing functional limitations):  Decreased Strength  Decreased Transfer Abilities  Decreased Ambulation Ability/Technique  Decreased Balance  Increased Pain  Decreased Activity Tolerance  Decreased Flexibility/Joint Mobility   INTERVENTIONS PLANNED: (Benefits and precautions of physical therapy have been discussed with the patient.)  bed mobility  gait training  home exercise program (HEP)  Range of Motion: active/assisted/passive  Therapeutic Activities  therapeutic exercise/strengthening  transfer training  Group Therapy     TREATMENT PLAN: Frequency/Duration: Follow patient BID for duration of hospital stay to address above goals. Rehabilitation Potential For Stated Goals: Good     RECOMMENDED REHABILITATION/EQUIPMENT: (at time of discharge pending progress): Continue Skilled Therapy and Home Health: Physical Therapy. HISTORY:   History of Present Injury/Illness (Reason for Referral):  Pt is status post left total knee replacement. Past Medical History/Comorbidities:   Ms. Eliza Wolf  has a past medical history of Acquired stenosis of external ear canal unspecified as to cause (8/16/2011), Anemia (10/13/2010), Anticoagulant long-term use (06/08/2016), Anxiety and depression (6/23/2009), Arthritis, Asthma, Atrial fibrillation ([facility] (2012), Atrophic vaginitis (5/2/2012), B12 deficiency (11/17/2010), Blood glucose elevated (2/3/2009), Chest pain, Chronic pain, Dysfunctional uterine bleeding (5/2/2012), Generalized anxiety disorder (10/21/2016), GERD (gastroesophageal reflux disease) (11/08/2012), High risk medication use (10/21/2016), HLD (hyperlipidemia) (2/3/2009), HTN (hypertension), Hypothyroidism (10/12/2009), IBS (irritable bowel syndrome) (7/5/2011), Insomnia (3/17/2010), Leaky heart valve, Leukocytosis (1/15/2014), LVH (left ventricular hypertrophy) (1/13/2009), Microscopic hematuria (4/16/2014), Obesity, Osteopenia (1/15/2014), Pernicious anemia (1/15/2014), Pneumonia (12/30/2009), Positive occult stool blood test (10/13/2010), Skin lesion (8/16/2011), Thrombocytopenia ([address] (2/28/2013), Unspecified sleep apnea, Urinary frequency (3/28/2013), and Vertigo (12/9/2010).  She also has no past medical history of Aneurysm ([address] Autoimmune disease ([facility] [address] CAD (coronary artery disease), Cancer ([facility] [address] Chronic kidney disease, Chronic obstructive pulmonary disease ([facility] [address] [facility] Coagulation disorder ([facility] [address] Diabetes ([facility] [address] Difficult intubation, Endocarditis, Heart failure ([facility] [address] Liver disease, Malignant hyperthermia due to anesthesia, Nausea & vomiting, Nicotine vapor product user, Non-nicotine vapor product user, Pseudocholinesterase deficiency, PUD (peptic ulcer disease), Rheumatic fever, Seizures ([facility] [address] Stroke ([facility] [address] or Thromboembolus ([facility] [address] Ms. Harish Pack  has a past surgical history that includes pr cardiac surg procedure unlist; hx colonoscopy (2010); and us guided core breast biopsy (Right, 1966). Social History/Living Environment:   Home Environment: Private residence  One/Two Story Residence: One story  Living Alone: No  Support Systems: Family member(s)  Patient Expects to be Discharged to[de-identified] Private residence  Current DME Used/Available at Home: Cane, straight, Raised toilet seat  Tub or Shower Type: Shower  Prior Level of Function/Work/Activity:  Pt is independent with ambulation. Number of Personal Factors/Comorbidities that affect the Plan of Care: 0: LOW COMPLEXITY   EXAMINATION:   Most Recent Physical Functioning:   Gross Assessment: Yes  Gross Assessment  AROM: Generally decreased, functional  Coordination: Generally decreased, functional          LLE AROM  L Knee Flexion: 60  L Knee Extension: 30          Bed Mobility  Supine to Sit: Minimum assistance  Scooting: Minimum assistance    Transfers  Sit to Stand: Minimum assistance  Stand to Sit: Minimum assistance  Bed to Chair: Minimum assistance    Balance  Sitting: Intact  Standing: Pull to stand; With support    Posture  Posture (WDL): Within defined limits            Distance (ft): 10 Feet (ft)  Ambulation - Level of Assistance: Minimal assistance  Assistive Device: Walker, rolling  Gait Abnormalities: Antalgic        Braces/Orthotics: none    Left Knee Cold  Type: Cryocuff      Body Structures Involved:  Bones  Joints  Muscles  Ligaments Body Functions Affected:  Neuromusculoskeletal  Movement Related Activities and Participation Affected: Mobility   Number of elements that affect the Plan of Care: 4+: HIGH COMPLEXITY   CLINICAL PRESENTATION:   Presentation: Stable and uncomplicated: LOW COMPLEXITY   CLINICAL DECISION MAKIN[address] [place] [address] AM-PAC 6 Clicks   Basic Mobility Inpatient Short Form  How much difficulty does the patient currently have. .. Unable A Lot A Little None   1. Turning over in bed (including adjusting bedclothes, sheets and blankets)? ? 1   ? 2   ? 3   ? 4   2. Sitting down on and standing up from a chair with arms ( e.g., wheelchair, bedside commode, etc.)   ? 1   ? 2   ? 3   ? 4   3. Moving from lying on back to sitting on the side of the bed?   ? 1   ? 2   ? 3   ? 4   How much help from another person does the patient currently need. .. Total A Lot A Little None   4. Moving to and from a bed to a chair (including a wheelchair)? ? 1   ? 2   ? 3   ? 4   5. Need to walk in hospital room? ? 1   ? 2   ? 3   ? 4   6. Climbing 3-5 steps with a railing? ? 1   ? 2   ? 3   ? 4   © , Trustees of [address] [place] [address], under license to [employer]. All rights reserved     Score:  Initial: 18 Most Recent: X (Date: -- )    Interpretation of Tool:  Represents activities that are increasingly more difficult (i.e. Bed mobility, Transfers, Gait). Medical Necessity:     Skilled intervention continues to be required due to decreased mobility ability. Reason for Services/Other Comments:  Patient continues to require skilled intervention due to decreased mobility ability.    .   Use of outcome tool(s) and clinical judgement create a POC that gives a: Clear prediction of patient's progress: LOW COMPLEXITY            TREATMENT:   (In addition to Assessment/Re-Assessment sessions the following treatments were rendered)     Pre-treatment Symptoms/Complaints:    Pain Initial:      Post Session:  pt complained of dizziness     Therapeutic Exercise: ( 15 minutes):  Exercises per grid below to improve mobility. Assessment/Reassessment only, no treatment provided today      Date:  10/22 Date:   Date:     ACTIVITY/EXERCISE AM PM AM PM AM PM   GROUP THERAPY  ?  ?  ?  ?  ?  ? Ankle Pumps  10       Quad Sets  10       Gluteal Sets  10       Hip ABd/ADduction  10       Straight Leg Raises  10       Knee Slides  10       Short Arc Quads         Long Arc Quads         Chair Slides                  B = bilateral; AA = active assistive; A = active; P = passive      Treatment/Session Assessment:     Response to Treatment:  Pt agreeable to therapy despite dizziness. Education:  ? Home Exercises  ? Fall Precautions  ? ? D/C Instruction Review  ? Knee Prosthesis Review  ? Walker Management/Safety ? Adaptive Equipment as Needed       Interdisciplinary Collaboration:   Physical Therapist  Occupational Therapist  Registered Nurse    After treatment position/precautions:   Up in chair  Bed/Chair-wheels locked  Bed in low position  Caregiver at bedside  Call light within reach  RN notified  Family at bedside    Compliance with Program/Exercises: Compliant most of the time, Will assess as treatment progresses. Recommendations/Intent for next treatment session:  Treatment next visit will focus on increasing Ms. Chung's independence with bed mobility, transfers, gait training, strength/ROM exercises, modalities for pain, and patient education.       Total Treatment Duration:  PT Patient Time In/Time Out  Time In: 1305  Time Out: [address], PT

## 2019-10-22 NOTE — PERIOP NOTES
PEPCID 20 MG PO, TYLENOL 1000 MG PO FOR H/E 1/10 RATING AND ZOFRAN 4 MG SIVP GIVEN AS ORDERED.  PT JUST IN BED FROM GOING TO [facility].

## 2019-10-22 NOTE — PROGRESS NOTES
TRANSFER - IN REPORT:    Verbal report received from Nury(name) on Angelita Moe  being received from [facility]) for routine post - op      Report consisted of patients Situation, Background, Assessment and   Recommendations(SBAR). Information from the following report(s) SBAR, Kardex, OR Summary, Procedure Summary, Intake/Output and MAR was reviewed with the receiving nurse. Opportunity for questions and clarification was provided. Assessment completed upon patients arrival to unit and care assumed.

## 2019-10-22 NOTE — ANESTHESIA PROCEDURE NOTES
Peripheral Block    Start time: 10/22/2019 8:56 AM  End time: 10/22/2019 8:57 AM  Performed by: Jaja Webb MD  Authorized by: Jaja Webb MD       Pre-procedure: Indications: at surgeon's request and post-op pain management    Preanesthetic Checklist: patient identified, risks and benefits discussed, site marked, timeout performed, anesthesia consent given and patient being monitored    Timeout Time: 08:54          Block Type:   Block Type:   Adductor canal  Laterality:  Left  Monitoring:  Standard ASA monitoring, continuous pulse ox, frequent vital sign checks, heart rate, responsive to questions and oxygen  Injection Technique:  Single shot  Procedures: ultrasound guided    Patient Position: supine  Prep: chlorhexidine    Location:  Mid thigh  Needle Type:  Stimuplex  Needle Gauge:  22 G  Needle Localization:  Ultrasound guidance    Assessment:  Number of attempts:  1  Injection Assessment:  Incremental injection every 5 mL, local visualized surrounding nerve on ultrasound, negative aspiration for CSF, negative aspiration for blood, no paresthesia, no intravascular symptoms and ultrasound image on chart  Patient tolerance:  Patient tolerated the procedure well with no immediate complications

## 2019-10-23 PROBLEM — Z96.652 TOTAL KNEE REPLACEMENT STATUS, LEFT: Status: ACTIVE | Noted: 2019-10-23

## 2019-10-23 LAB
ANION GAP SERPL CALC-SCNC: 5 MMOL/L (ref 7–16)
BUN SERPL-MCNC: 20 MG/DL (ref 8–23)
CALCIUM SERPL-MCNC: 8.8 MG/DL (ref 8.3–10.4)
CHLORIDE SERPL-SCNC: 105 MMOL/L (ref 98–107)
CO2 SERPL-SCNC: 26 MMOL/L (ref 21–32)
CREAT SERPL-MCNC: 0.92 MG/DL (ref 0.6–1)
GLUCOSE SERPL-MCNC: 141 MG/DL (ref 65–100)
POTASSIUM SERPL-SCNC: 4.1 MMOL/L (ref 3.5–5.1)
SODIUM SERPL-SCNC: 136 MMOL/L (ref 136–145)

## 2019-10-23 PROCEDURE — 74011250636 HC RX REV CODE- 250/636: Performed by: ORTHOPAEDIC SURGERY

## 2019-10-23 PROCEDURE — 94760 N-INVAS EAR/PLS OXIMETRY 1: CPT

## 2019-10-23 PROCEDURE — 36415 COLL VENOUS BLD VENIPUNCTURE: CPT

## 2019-10-23 PROCEDURE — 97116 GAIT TRAINING THERAPY: CPT

## 2019-10-23 PROCEDURE — 97535 SELF CARE MNGMENT TRAINING: CPT

## 2019-10-23 PROCEDURE — 74011250637 HC RX REV CODE- 250/637: Performed by: ORTHOPAEDIC SURGERY

## 2019-10-23 PROCEDURE — 65270000029 HC RM PRIVATE

## 2019-10-23 PROCEDURE — 97150 GROUP THERAPEUTIC PROCEDURES: CPT

## 2019-10-23 PROCEDURE — 80048 BASIC METABOLIC PNL TOTAL CA: CPT

## 2019-10-23 PROCEDURE — 97110 THERAPEUTIC EXERCISES: CPT

## 2019-10-23 RX ORDER — HYDROCODONE BITARTRATE AND ACETAMINOPHEN 7.5; 325 MG/1; MG/1
1-2 TABLET ORAL
Qty: 60 TAB | Refills: 0 | Status: SHIPPED | OUTPATIENT
Start: 2019-10-23 | End: 2019-10-30

## 2019-10-23 RX ORDER — PROMETHAZINE HYDROCHLORIDE 25 MG/1
25 TABLET ORAL
Qty: 40 TAB | Refills: 0 | Status: SHIPPED | OUTPATIENT
Start: 2019-10-23 | End: 2020-01-10

## 2019-10-23 RX ORDER — ASPIRIN 81 MG/1
81 TABLET ORAL EVERY 12 HOURS
Qty: 6 TAB | Refills: 0 | Status: SHIPPED | OUTPATIENT
Start: 2019-10-23 | End: 2019-10-26

## 2019-10-23 RX ORDER — METHOCARBAMOL 750 MG/1
750 TABLET, FILM COATED ORAL
Status: DISCONTINUED | OUTPATIENT
Start: 2019-10-23 | End: 2019-10-24 | Stop reason: HOSPADM

## 2019-10-23 RX ORDER — HYDROCODONE BITARTRATE AND ACETAMINOPHEN 7.5; 325 MG/1; MG/1
1-2 TABLET ORAL
Status: DISCONTINUED | OUTPATIENT
Start: 2019-10-23 | End: 2019-10-24 | Stop reason: HOSPADM

## 2019-10-23 RX ORDER — METHOCARBAMOL 750 MG/1
750 TABLET, FILM COATED ORAL
Qty: 40 TAB | Refills: 0 | Status: SHIPPED | OUTPATIENT
Start: 2019-10-23 | End: 2020-01-10

## 2019-10-23 RX ADMIN — PANTOPRAZOLE SODIUM 40 MG: 40 TABLET, DELAYED RELEASE ORAL at 05:21

## 2019-10-23 RX ADMIN — OXYCODONE HYDROCHLORIDE 5 MG: 5 TABLET ORAL at 01:31

## 2019-10-23 RX ADMIN — HYDROCODONE BITARTRATE AND ACETAMINOPHEN 1 TABLET: 7.5; 325 TABLET ORAL at 12:25

## 2019-10-23 RX ADMIN — ACETAMINOPHEN 1000 MG: 500 TABLET, FILM COATED ORAL at 05:20

## 2019-10-23 RX ADMIN — Medication 1 AMPULE: at 09:07

## 2019-10-23 RX ADMIN — FUROSEMIDE 20 MG: 20 TABLET ORAL at 09:06

## 2019-10-23 RX ADMIN — DILTIAZEM HYDROCHLORIDE 240 MG: 240 CAPSULE, COATED, EXTENDED RELEASE ORAL at 09:07

## 2019-10-23 RX ADMIN — HYDROCODONE BITARTRATE AND ACETAMINOPHEN 1 TABLET: 7.5; 325 TABLET ORAL at 09:06

## 2019-10-23 RX ADMIN — HYDROCODONE BITARTRATE AND ACETAMINOPHEN 1 TABLET: 7.5; 325 TABLET ORAL at 14:39

## 2019-10-23 RX ADMIN — LEVOTHYROXINE SODIUM 112 MCG: 112 TABLET ORAL at 05:21

## 2019-10-23 RX ADMIN — HYDROCODONE BITARTRATE AND ACETAMINOPHEN 1 TABLET: 7.5; 325 TABLET ORAL at 22:08

## 2019-10-23 RX ADMIN — MONTELUKAST 10 MG: 10 TABLET, FILM COATED ORAL at 20:43

## 2019-10-23 RX ADMIN — Medication 1 AMPULE: at 20:42

## 2019-10-23 RX ADMIN — OXYCODONE HYDROCHLORIDE 5 MG: 5 TABLET ORAL at 05:21

## 2019-10-23 RX ADMIN — ASPIRIN 81 MG: 81 TABLET, COATED ORAL at 09:07

## 2019-10-23 RX ADMIN — SENNOSIDES AND DOCUSATE SODIUM 2 TABLET: 8.6; 5 TABLET ORAL at 09:07

## 2019-10-23 RX ADMIN — Medication 2 G: at 01:32

## 2019-10-23 RX ADMIN — ZOLPIDEM TARTRATE 5 MG: 5 TABLET ORAL at 20:43

## 2019-10-23 RX ADMIN — LISINOPRIL 20 MG: 20 TABLET ORAL at 09:07

## 2019-10-23 RX ADMIN — HYDROCODONE BITARTRATE AND ACETAMINOPHEN 1 TABLET: 7.5; 325 TABLET ORAL at 18:04

## 2019-10-23 RX ADMIN — BUDESONIDE AND FORMOTEROL FUMARATE DIHYDRATE 2 PUFF: 160; 4.5 AEROSOL RESPIRATORY (INHALATION) at 09:22

## 2019-10-23 RX ADMIN — CELECOXIB 200 MG: 200 CAPSULE ORAL at 20:43

## 2019-10-23 RX ADMIN — ASPIRIN 81 MG: 81 TABLET, COATED ORAL at 20:42

## 2019-10-23 NOTE — PROGRESS NOTES
Progress Note    Patient: Yulisa Adam MRN: [id number]  SSN: [id number]    YOB: 1945  Age: 76 y.o. Sex: female      Admit Date: 10/22/2019    LOS: 1 day     Subjective:   She was found sitting in a chair. Her son was present and all questions were answered. She had a poor night sleep. Her pain is better today. She might stay 1 more night according to what she stated. Objective:     Vitals:    10/22/19 2126 10/22/19 2336 10/23/19 0457 10/23/19 0704   BP:  135/67 111/73 134/73   Pulse:  92 96 78   Resp:  18 18 16   Temp:  98.2 °F (36.8 °C) 98 °F (36.7 °C) 97.6 °F (36.4 °C)   SpO2: 93% 90% 98% 96%   Weight:       Height:            Intake and Output:  Current Shift: No intake/output data recorded. Last three shifts: 10/21 1901 - 10/23 0700  In: 1150 [I.V.:1150]  Out: 150     Physical Exam:   GENERAL: alert, cooperative, no distress, appears stated age  EYE: negative  LYMPHATIC: Cervical, supraclavicular, and axillary nodes normal.   THROAT & NECK: normal and no erythema or exudates noted. LUNG: clear to auscultation bilaterally  HEART: regular rate and rhythm, S1, S2 normal, no murmur, click, rub or gallop  ABDOMEN: soft, non-tender. Bowel sounds normal. No masses,  no organomegaly  EXTREMITIES: Left knee with brace, mild edema   SKIN: Normal.  NEUROLOGIC: negative  PSYCHIATRIC: non focal    Lab/Data Review: All lab results for the last 24 hours reviewed. Assessment:     Principal Problem: Total knee replacement status, left (10/23/2019)    Active Problems:    Atrial fibrillation ([address] (12/4/2014)      Overview: Due to Tikosyn. Multiple med interactions. Can take PCN, cephalosporins,       plain claritin and plain zyrtec. Hypertension (12/4/2014)      Hypothyroidism (12/30/2014)      GERD (gastroesophageal reflux disease) (11/8/2012)      Asthma (1/7/2016)      Overview: Last Assessment & Plan:       Asthma appears to be doing well with control of her triggers.  She has      Understanding of her triggers in her medications. I will see her in 12       months with a complete pulmonary function test      Last Assessment & Plan: We had another educational session about her asthma. She has done a good       job of removing triggers from her environment. She still has the cat but       is keeping it out of the bedroom. I instructed her to obtain a good       air-filled her. She no longer wears perfume and is noticed a benefit. She       is using her maintenance medication Qvar correctly. I instructed her to       use her rescue inhaler more often. Anticoagulant long-term use (6/8/2016)      Arthritis of knee, left (10/22/2019)        Plan:   -post operative management:  Hx asthma: duonebs prn, symbicort bid   Hx hypothyroid: levothyroxine  Hx A fib: on diltiazem. Holding [address] due to recent surgery. Defer to orthopedics when to resume. Hx HTN: lisinopril. Resume lasix   Insomnia: resume ambien      -sp left TKA  Continue pain control by orthopedics  Stool softeners  on room air  Continue incentive spirometer  PT OT     DVT ppx: compression stockings     Disposition: STR versus home     CM on board     Thank you for allowing us to participate in the care of this patient. I am signing off. Call us prn .     Signed By: Mumtaz Long MD     October 23, 2019

## 2019-10-23 NOTE — DISCHARGE SUMMARY
[address] [place] [address]  Total Joint Discharge Summary      Patient ID:  Leroy Iverson  [id number]  27 y.o.  1945    Admit date: 10/22/2019  Discharge date and time: 10-23-19  Admitting Physician: Yoseph Hernandez MD  Surgeon: Same  Admission Diagnoses: Unilateral primary osteoarthritis, left knee [M17.12]  Arthritis of knee, left [M17.12]  Discharge Diagnoses: Principal Problem: Total knee replacement status, left (10/23/2019)    Active Problems:    Atrial fibrillation ([address] (12/4/2014)      Overview: Due to Tikosyn. Multiple med interactions. Can take PCN, cephalosporins,       plain claritin and plain zyrtec. Hypertension (12/4/2014)      Hypothyroidism (12/30/2014)      GERD (gastroesophageal reflux disease) (11/8/2012)      Asthma (1/7/2016)      Overview: Last Assessment & Plan:       Asthma appears to be doing well with control of her triggers. She has      Understanding of her triggers in her medications. I will see her in 12       months with a complete pulmonary function test      Last Assessment & Plan: We had another educational session about her asthma. She has done a good       job of removing triggers from her environment. She still has the cat but       is keeping it out of the bedroom. I instructed her to obtain a good       air-filled her. She no longer wears perfume and is noticed a benefit. She       is using her maintenance medication Qvar correctly. I instructed her to       use her rescue inhaler more often. Anticoagulant long-term use (6/8/2016)      Arthritis of knee, left (10/22/2019)                                Perioperative Antibiotics: Ancef 1 to 2 mg was given depending on patient's weight. If allergic to Ancef or due to other indications, patient was given Vancomycin.       Hospital Medications given:   [unfilled]  [unfilled]  [unfilled]    Discharge Medications given:  Current Discharge Medication List      START taking these medications Details   aspirin delayed-release 81 mg tablet Take 1 Tab by mouth every twelve (12) hours every twelve (12) hours for 3 days. Qty: 6 Tab, Refills: 0      HYDROcodone-acetaminophen (NORCO) 7.5-325 mg per tablet Take 1-2 Tabs by mouth every four (4) hours as needed for Pain for up to 7 days. Max Daily Amount: 12 Tabs. Qty: 60 Tab, Refills: 0    Associated Diagnoses: Total knee replacement status, left      methocarbamol (ROBAXIN) 750 mg tablet Take 1 Tab by mouth four (4) times daily as needed for Other (muscle spasms). Qty: 40 Tab, Refills: 0      promethazine (PHENERGAN) 25 mg tablet Take 1 Tab by mouth every six (6) hours as needed for Nausea. Qty: 40 Tab, Refills: 0         CONTINUE these medications which have CHANGED    Details   rivaroxaban (XARELTO) 20 mg tab tablet Take 1 Tab by mouth daily. Qty: 90 Tab, Refills: 3    Associated Diagnoses: Paroxysmal atrial fibrillation (HCC)         CONTINUE these medications which have NOT CHANGED    Details   furosemide (LASIX) 20 mg tablet Furosemide 20 Mg, one PO daily  Qty: 90 Tab, Refills: 3    Associated Diagnoses: Essential hypertension; Benign paroxysmal positional vertigo, unspecified laterality; On potassium wasting diuretic therapy      lisinopril (PRINIVIL, ZESTRIL) 20 mg tablet Take 1 Tab by mouth daily. Qty: 90 Tab, Refills: 3    Associated Diagnoses: Essential hypertension; Atherosclerosis of both carotid arteries      montelukast (SINGULAIR) 10 mg tablet Take 1 Tab by mouth daily. Qty: 90 Tab, Refills: 3    Associated Diagnoses: Moderate persistent asthma without complication; Environmental allergies      omeprazole (PRILOSEC) 20 mg capsule Take 1 Cap by mouth two (2) times a day. Qty: 180 Cap, Refills: 3    Associated Diagnoses: Gastroesophageal reflux disease with esophagitis; Medication refill      budesonide-formoterol (SYMBICORT) 160-4.5 mcg/actuation HFAA Take 2 Puffs by inhalation two (2) times a day.   Qty: 1 Inhaler, Refills: 11 Associated Diagnoses: Moderate persistent asthma without complication      zolpidem (AMBIEN) 5 mg tablet Take 1 Tab by mouth nightly as needed for Sleep. Max Daily Amount: 5 mg. Qty: 30 Tab, Refills: 5    Associated Diagnoses: Primary insomnia      levothyroxine (SYNTHROID) 112 mcg tablet Take 1 Tab by mouth Daily (before breakfast). Qty: 90 Tab, Refills: 3    Associated Diagnoses: Acquired hypothyroidism      potassium chloride (K-DUR, KLOR-CON) 20 mEq tablet Take 1 Tab by mouth daily. Qty: 90 Tab, Refills: 3    Associated Diagnoses: Essential hypertension; Medication refill      dilTIAZem XR (DILACOR XR) 240 mg XR capsule Take 1 Cap by mouth daily. Qty: 90 Cap, Refills: 3    Associated Diagnoses: Paroxysmal atrial fibrillation ([address] Medication refill; Essential hypertension      cholecalciferol, vitamin D3, 5,000 unit TbDi Take 5,000 Units by mouth daily. Indications: VITAMIN D DEFICIENCY (HIGH DOSE THERAPY)  Qty: 30 Tab, Refills: 11    Associated Diagnoses: Hypovitaminosis D      cyanocobalamin (VITAMIN B-12) 1,000 mcg tablet Take 1,000 mcg by mouth daily. Associated Diagnoses: Medication refill      albuterol (PROAIR HFA) 90 mcg/actuation inhaler Take 1 Puff by inhalation every four (4) hours as needed for Wheezing. Patient instructed to bring med (in Rx bottle)to hospital      flecainide (TAMBOCOR) 100 mg tablet Take 200 mg by mouth daily as needed.       OTHER Please provide patient with custom orthotic and extra depth shoes  Dx.M20.11, M20.5x1  Qty: 1 Each, Refills: 0    Associated Diagnoses: Deformity of both feet         STOP taking these medications       vitamin e 600 unit capsule Comments:   Reason for Stopping:                Additional DVT Prophylaxis:  JESSICA Hose,Plexi-Pulse    Postoperative transfusions:   none  Post Op complications: none    Hemoglobin at discharge:   Lab Results   Component Value Date/Time    HGB 10.6 (L) 10/22/2019 07:27 PM       Wound appears to be healing without any evidence of infection. Physical Therapy started on the day following surgery and progressed to independent ambulation with the aid of a walker. At the time of discharge, able to go up and down stairs and had understanding of precautions needed following surgery. PT/OT:         Activity Response: Tolerated well  Assistive Device: Walker (comment)        LLE AROM  L Knee Flexion: 60  L Knee Extension: 30       Discharged to: home    Discharge instructions:  -Rx pain medication given  - Anticoagulate with: Ecotrin 81 mg PO BID x 3 days, then resume Xarelto as already prescribed.   -Resume pre hospital diet             -Resume home medications per medical continuation form     -Ambulate with walker, appropriate total joint protocol  -Follow up in office as scheduled       Signed:  BISHNU Moreno  10/23/2019  7:58 AM

## 2019-10-23 NOTE — PROGRESS NOTES
Problem: Mobility Impaired (Adult and Pediatric)  Goal: *Acute Goals and Plan of Care (Insert Text)  Description  GOALS (1-4 days):  (1.)Ms. Martha Meyers will move from supine to sit and sit to supine  in bed with INDEPENDENT. (2.)Ms. Martha Meyers will transfer from bed to chair and chair to bed with INDEPENDENT using the least restrictive device. (3.)Ms. Chung will ambulate with INDEPENDENT for 250 feet with the least restrictive device. (4.)Ms. Chung will ambulate up/down 3 steps with bilateral  railing with MINIMAL ASSIST with no device. (5.)Ms. Martha Meyers will increase left knee ROM to 5°-80°.   ________________________________________________________________________________________________     Outcome: Progressing Towards Goal       PHYSICAL THERAPY JOINT CAMP TKA: Daily Note, Treatment Day: 1st and PM 10/23/2019  INPATIENT: Hospital Day: 2  Payor: Mark Fonseca / Plan: Melba Sage / Product Type: Managed [employer] Medicare /      NAME/AGE/GENDER: Leroy Iverson is a 76 y.o. female   PRIMARY DIAGNOSIS:  Unilateral primary osteoarthritis, left knee [M17.12]   Procedure(s) and Anesthesia Type:     * LEFT KNEE ARTHROPLASTY TOTAL / Dulce Coon - Spinal (Left)  ICD-10: Treatment Diagnosis:    · Pain in Left Knee (M25.562)  · Stiffness of Left Knee, Not elsewhere classified (M25.662)  · Difficulty in walking, Not elsewhere classified (R26.2)  · Other abnormalities of gait and mobility (R26.89)      ASSESSMENT:     Ms. Martha Meyers presents with decreased independence with functional mobility. Pt performed exercises in supine. Pt performed supine to sit to stand. Pt took steps to bedside chair. 10/23--Pt performed sit to stand. Pt ambulated in room. Pt performed exercises in bedside chair. Pt's daughter and son present and supportive. PM--Pt performed sit to stand. Pt ambulated in hallway. Pt performed exercises in rehab room. Pt ambulated to room. Pt in bedside chair with needs in reach.  Pt's sister and niece present. Pt progressing with ambulation. This section established at most recent assessment   PROBLEM LIST (Impairments causing functional limitations):  1. Decreased Strength  2. Decreased Transfer Abilities  3. Decreased Ambulation Ability/Technique  4. Decreased Balance  5. Increased Pain  6. Decreased Activity Tolerance  7. Decreased Flexibility/Joint Mobility   INTERVENTIONS PLANNED: (Benefits and precautions of physical therapy have been discussed with the patient.)  1. bed mobility  2. gait training  3. home exercise program (HEP)  4. Range of Motion: active/assisted/passive  5. Therapeutic Activities  6. therapeutic exercise/strengthening  7. transfer training  8. Group Therapy     TREATMENT PLAN: Frequency/Duration: Follow patient BID for duration of hospital stay to address above goals. Rehabilitation Potential For Stated Goals: Good     RECOMMENDED REHABILITATION/EQUIPMENT: (at time of discharge pending progress): Continue Skilled Therapy and Home Health: Physical Therapy. HISTORY:   History of Present Injury/Illness (Reason for Referral):  Pt is status post left total knee replacement.   Past Medical History/Comorbidities:   Ms. 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 (8/16/2011), Anemia (10/13/2010), Anticoagulant long-term use (06/08/2016), Anxiety and depression (6/23/2009), Arthritis, Asthma, Atrial fibrillation ([facility] [address] (2012), Atrophic vaginitis (5/2/2012), B12 deficiency (11/17/2010), Blood glucose elevated (2/3/2009), Chest pain, Chronic pain, Dysfunctional uterine bleeding (5/2/2012), Generalized anxiety disorder (10/21/2016), GERD (gastroesophageal reflux disease) (11/08/2012), High risk medication use (10/21/2016), HLD (hyperlipidemia) (2/3/2009), HTN (hypertension), Hypothyroidism (10/12/2009), IBS (irritable bowel syndrome) (7/5/2011), Insomnia (3/17/2010), Leaky heart valve, Leukocytosis (1/15/2014), LVH (left ventricular hypertrophy) (1/13/2009), Microscopic hematuria (4/16/2014), Obesity, Osteopenia (1/15/2014), Pernicious anemia (1/15/2014), Pneumonia (12/30/2009), Positive occult stool blood test (10/13/2010), Skin lesion (8/16/2011), Thrombocytopenia ([address] (2/28/2013), Unspecified sleep apnea, Urinary frequency (3/28/2013), and Vertigo (12/9/2010). She also has no past medical history of Aneurysm ([address] Autoimmune disease ([address] CAD (coronary artery disease), Cancer ([address] Chronic kidney disease, Chronic obstructive pulmonary disease ([address] Coagulation disorder ([address] Diabetes ([address] Difficult intubation, Endocarditis, Heart failure ([address] Liver disease, Malignant hyperthermia due to anesthesia, Nausea & vomiting, Nicotine vapor product user, Non-nicotine vapor product user, Pseudocholinesterase deficiency, PUD (peptic ulcer disease), Rheumatic fever, Seizures ([address] Stroke ([address] or Thromboembolus ([address] Ms. Angel Bond  has a past surgical history that includes pr cardiac surg procedure unlist; hx colonoscopy (2010); and us guided core breast biopsy (Right, 1966). Social History/Living Environment:   Home Environment: Private residence  One/Two Story Residence: One story  Living Alone: No  Support Systems: Family member(s)  Patient Expects to be Discharged to[de-identified] Private residence  Current DME Used/Available at Home: Cane, straight, Raised toilet seat  Tub or Shower Type: Shower  Prior Level of Function/Work/Activity:  Pt is independent with ambulation. Number of Personal Factors/Comorbidities that affect the Plan of Care: 0: LOW COMPLEXITY   EXAMINATION:   Most Recent Physical Functioning:                 LLE AROM  L Knee Flexion: 95  L Knee Extension: 7               Transfers  Sit to Stand: Minimum assistance  Stand to Sit: Minimum assistance    Balance  Sitting: Intact  Standing: Pull to stand; With support              Weight Bearing Status  Left Side Weight Bearing: As tolerated  Distance (ft): 274 Feet (ft)(walked distance twice)  Ambulation - Level of Assistance: Contact guard assistance  Assistive Device: Walker, rolling  Gait Abnormalities: Antalgic        Braces/Orthotics: none    Left Knee Cold  Type: Cryocuff      Body Structures Involved:  1. Bones  2. Joints  3. Muscles  4. Ligaments Body Functions Affected:  1. Neuromusculoskeletal  2. Movement Related Activities and Participation Affected:  1. Mobility   Number of elements that affect the Plan of Care: 4+: HIGH COMPLEXITY   CLINICAL PRESENTATION:   Presentation: Stable and uncomplicated: LOW COMPLEXITY   CLINICAL DECISION MAKIN[address] [place] [address] AM-PAC 6 Clicks   Basic Mobility Inpatient Short Form  How much difficulty does the patient currently have. .. Unable A Lot A Little None   1. Turning over in bed (including adjusting bedclothes, sheets and blankets)? ? 1   ? 2   ? 3   ? 4   2. Sitting down on and standing up from a chair with arms ( e.g., wheelchair, bedside commode, etc.)   ? 1   ? 2   ? 3   ? 4   3. Moving from lying on back to sitting on the side of the bed?   ? 1   ? 2   ? 3   ? 4   How much help from another person does the patient currently need. .. Total A Lot A Little None   4. Moving to and from a bed to a chair (including a wheelchair)? ? 1   ? 2   ? 3   ? 4   5. Need to walk in hospital room? ? 1   ? 2   ? 3   ? 4   6. Climbing 3-5 steps with a railing? ? 1   ? 2   ? 3   ? 4   © , Trustees of [address] [place] [address], under license to [employer]. All rights reserved     Score:  Initial: 18 Most Recent: X (Date: -- )    Interpretation of Tool:  Represents activities that are increasingly more difficult (i.e. Bed mobility, Transfers, Gait). Medical Necessity:     · Skilled intervention continues to be required due to decreased mobility ability. Reason for Services/Other Comments:  · Patient continues to require skilled intervention due to decreased mobility ability.    · .   Use of outcome tool(s) and clinical judgement create a POC that gives a: Clear prediction of patient's progress: LOW COMPLEXITY            TREATMENT:   (In addition to Assessment/Re-Assessment sessions the following treatments were rendered)     Pre-treatment Symptoms/Complaints:    Pain Initial:   Pain Intensity 1: 5  Pain Location 1: Knee  Pain Orientation 1: Left  Post Session:  Pain in left knee     Therapeutic Exercise: ( 45 minutes--group):  Exercises per grid below to improve mobility. Gait Training (  15 minutes):  Gait training to improve and/or restore physical functioning as related to mobility. Assistive Device: Walker, rolling  Ambulation - Level of Assistance: Contact guard assistance  Distance (ft): 274 Feet (ft)(walked distance twice)       Date:  10/22 Date:  10/23 Date:     ACTIVITY/EXERCISE AM PM AM PM AM PM   GROUP THERAPY  ? ?  ?  x  ?  ? Ankle Pumps  10 10 15     Quad Sets  10 10 15     Gluteal Sets  10 10 15     Hip ABd/ADduction  10 10 15     Straight Leg Raises  10 10 15     Knee Slides  10 10 15     Short Arc Quads    15     Long Arc Quads         Chair Slides    15              B = bilateral; AA = active assistive; A = active; P = passive      Treatment/Session Assessment:     Response to Treatment:  Pt agreeable to exercise and ambulate in rehab room. Education:  ? Home Exercises  ? Fall Precautions  ? ? D/C Instruction Review  x Knee Prosthesis Review  ? Walker Management/Safety ? Adaptive Equipment as Needed       Interdisciplinary Collaboration:   o Physical Therapist  o Registered Nurse  o Rehabilitation Attendant    After treatment position/precautions:   o Up in chair  o Bed/Chair-wheels locked  o Bed in low position  o Caregiver at bedside  o Call light within reach  o RN notified  o Family at bedside    Compliance with Program/Exercises: Compliant most of the time, Will assess as treatment progresses.     Recommendations/Intent for next treatment session:  Treatment next visit will focus on increasing Ms. Chung's independence with bed mobility, transfers, gait training, strength/ROM exercises, modalities for pain, and patient education.       Total Treatment Duration:  PT Patient Time In/Time Out  Time In: 1300  Time Out: [address], PT

## 2019-10-23 NOTE — PROGRESS NOTES
Problem: Self Care Deficits Care Plan (Adult)  Goal: *Acute Goals and Plan of Care (Insert Text)  Description  GOALS:   DISCHARGE GOALS (in preparation for going home/rehab):  3 days  1. Ms. Martha Meyers will perform one lower body dressing activity with minimal assistance required to demonstrate improved functional mobility and safety. GOAL MET 10/23/2019    2. Ms. Martha Meyers will perform one lower body bathing activity with minimal assistance required to demonstrate improved functional mobility and safety. GOAL MET 10/23/2019    3. Ms. Martha Meyers will perform toileting/toilet transfer with contact guard assistance to demonstrate improved functional mobility and safety. GOAL MET 10/23/2019    4. Ms. Martha Meyers will perform shower transfer with contact guard assistance to demonstrate improved functional mobility and safety. GOAL MET 10/23/2019       Outcome: Progressing Towards Goal     JOINT CAMP OCCUPATIONAL THERAPY TKA: Daily Note, Treatment Day: 1st and AM 10/23/2019  INPATIENT: Hospital Day: 2  Payor: Mark Fonseca / Plan: Melba Sage / Product Type: [employer] /      NAME/AGE/GENDER: Leroy Iverson is a 76 y.o. female   PRIMARY DIAGNOSIS:  Unilateral primary osteoarthritis, left knee [M17.12]   Procedure(s) and Anesthesia Type:     * LEFT KNEE ARTHROPLASTY TOTAL / Dulce Coon - Spinal (Left)  ICD-10: Treatment Diagnosis:    · Pain in Left Knee (M25.562)  · Stiffness of Left Knee, Not elsewhere classified (J54.385)  · Generalized Muscle Weakness (M62.81)  · Other lack of cordination (R27.8)      ASSESSMENT:    Ms. Martha Meyers is s/p left TKA and presents with decreased weight bearing on left LE and decreased independence with functional mobility and activities of daily living. Patient completed shower and dressing as charter below in ADL grid and is ambulating with rolling walker and CGA/min assist.  Patient has met 4/4 goals and plans to return home with good family support. Family able to provide patient with appropriate level of assistance at this time. OT reviewed safety precautions throughout session and therapy schedule for the remainder of today. Patient instructed to call for assistance when needing to get up from recliner and all needs in reach. Patient verbalized understanding of call light. This section established at most recent assessment   PROBLEM LIST (Impairments causing functional limitations):  1. Decreased Strength  2. Decreased ADL/Functional Activities  3. Decreased Transfer Abilities  4. Increased Pain  5. Increased Fatigue  6. Decreased Flexibility/Joint Mobility  7. Decreased Knowledge of Precautions   INTERVENTIONS PLANNED: (Benefits and precautions of occupational therapy have been discussed with the patient.)  1. Activities of daily living training  2. Adaptive equipment training  3. Balance training  4. Clothing management  5. Donning&doffing training  6. Theraputic activity     TREATMENT PLAN: Frequency/Duration: Follow patient 1-2 times to address above goals. Rehabilitation Potential For Stated Goals: Good     RECOMMENDED REHABILITATION/EQUIPMENT: (at time of discharge pending progress): Continue Skilled Therapy. OCCUPATIONAL PROFILE AND HISTORY:   History of Present Injury/Illness (Reason for Referral): Pt presents this date s/p (left) TKA.     Past Medical History/Comorbidities:   Ms. 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 (8/16/2011), Anemia (10/13/2010), Anticoagulant long-term use (06/08/2016), Anxiety and depression (6/23/2009), Arthritis, Asthma, Atrial fibrillation ([facility] [address] (2012), Atrophic vaginitis (5/2/2012), B12 deficiency (11/17/2010), Blood glucose elevated (2/3/2009), Chest pain, Chronic pain, Dysfunctional uterine bleeding (5/2/2012), Generalized anxiety disorder (10/21/2016), GERD (gastroesophageal reflux disease) (11/08/2012), High risk medication use (10/21/2016), HLD (hyperlipidemia) (2/3/2009), HTN (hypertension), Hypothyroidism (10/12/2009), IBS (irritable bowel syndrome) (7/5/2011), Insomnia (3/17/2010), Leaky heart valve, Leukocytosis (1/15/2014), LVH (left ventricular hypertrophy) (1/13/2009), Microscopic hematuria (4/16/2014), Obesity, Osteopenia (1/15/2014), Pernicious anemia (1/15/2014), Pneumonia (12/30/2009), Positive occult stool blood test (10/13/2010), Skin lesion (8/16/2011), Thrombocytopenia ([address] (2/28/2013), Unspecified sleep apnea, Urinary frequency (3/28/2013), and Vertigo (12/9/2010). She also has no past medical history of Aneurysm ([address] Autoimmune disease ([address] CAD (coronary artery disease), Cancer ([address] Chronic kidney disease, Chronic obstructive pulmonary disease ([address] Coagulation disorder ([address] Diabetes ([address] Difficult intubation, Endocarditis, Heart failure ([address] Liver disease, Malignant hyperthermia due to anesthesia, Nausea & vomiting, Nicotine vapor product user, Non-nicotine vapor product user, Pseudocholinesterase deficiency, PUD (peptic ulcer disease), Rheumatic fever, Seizures ([address] Stroke ([address] or Thromboembolus ([address] Ms. Heidi Gimenez  has a past surgical history that includes pr cardiac surg procedure unlist; hx colonoscopy (2010); and us guided core breast biopsy (Right, 1966).   Social History/Living Environment:   Home Environment: Private residence  One/Two Story Residence: One story  Living Alone: No  Support Systems: Family member(s)  Patient Expects to be Discharged to[de-identified] Private residence  Current DME Used/Available at Home: Cane, straight, Raised toilet seat  Tub or Shower Type: Shower  Prior Level of Function/Work/Activity:  Mod I with ADLS lives alone     Number of Personal Factors/Comorbidities that affect the Plan of Care: Brief history (0):  LOW COMPLEXITY   ASSESSMENT OF OCCUPATIONAL PERFORMANCE[de-identified]   Most Recent Physical Functioning:   Balance  Sitting: Intact  Standing: With support Mental Status  Neurologic State: Alert; Appropriate for age  Orientation Level: Appropriate for age  Cognition: Appropriate decision making; Appropriate for age attention/concentration; Appropriate safety awareness; Follows commands  Perception: Appears intact  Perseveration: No perseveration noted  Safety/Judgement: Awareness of environment; Fall prevention                Basic ADLs (From Assessment) Complex ADLs (From Assessment)   Basic ADL  Feeding: Supervision  Oral Facial Hygiene/Grooming: Supervision  Bathing: Moderate assistance  Type of Bath: Chlorhexidine (CHG), Shower  Upper Body Dressing: Supervision  Lower Body Dressing: Moderate assistance  Toileting: Moderate assistance     Grooming/Bathing/Dressing Activities of Daily Living   Grooming  Grooming Assistance: Supervision  Washing Face: Supervision  Washing Hands: Supervision Cognitive Retraining  Safety/Judgement: Awareness of environment; Fall prevention   Upper Body Bathing  Bathing Assistance: Supervision  Position Performed: Seated in chair  Adaptive Equipment: Shower chair     Lower Body Bathing  Bathing Assistance: Minimum assistance  Perineal  : Contact guard assistance  Position Performed: Standing  Adaptive Equipment: Grab bar  Lower Body : Minimum assistance  Position Performed: Seated in chair;Standing  Adaptive Equipment: Grab bar; Shower chair     Upper Body Dressing Assistance  Dressing Assistance: Supervision  Bra: Supervision  Hospital Gown: Supervision  Pullover Shirt: Supervision  Front Opened Shirt: Supervision Functional Transfers  Bathroom Mobility: Minimum assistance;Contact guard assistance  Toilet Transfer : Contact guard assistance  Shower Transfer: Contact guard assistance  Adaptive Equipment: Bedside commode;Grab bars; Shower chair with back   Lower Body Dressing Assistance  Dressing Assistance: Minimum assistance  Underpants: Contact guard assistance  Pants With Elastic Waist: Contact guard assistance  Socks: Minimum assistance  Shoes with Velcro: Minimum assistance           Physical Skills Involved:  1. Range of Motion  2. Balance  3. Strength Cognitive Skills Affected (resulting in the inability to perform in a timely and safe manner):  1. WFL  Psychosocial Skills Affected:  1. none    Number of elements that affect the Plan of Care: 1-3:  LOW COMPLEXITY   CLINICAL DECISION MAKIN[address] [place] [address] AM-PAC 6 Clicks   Daily Activity Inpatient Short Form  How much help from another person does the patient currently need. .. Total A Lot A Little None   1. Putting on and taking off regular lower body clothing? ? 1   ? 2   ? 3   ? 4   2. Bathing (including washing, rinsing, drying)? ? 1   ? 2   ? 3   ? 4   3. Toileting, which includes using toilet, bedpan or urinal?   ? 1   ? 2   ? 3   ? 4   4. Putting on and taking off regular upper body clothing? ? 1   ? 2   ? 3   ? 4   5. Taking care of personal grooming such as brushing teeth? ? 1   ? 2   ? 3   ? 4   6. Eating meals? ? 1   ? 2   ? 3   ? 4   © , Trustees of [address] [place] [address], under license to [employer]. All rights reserved     Score:  Initial: 18 Most Recent: 21 discharge 10/23/19    Interpretation of Tool:  Represents activities that are increasingly more difficult (i.e. Bed mobility, Transfers, Gait). Medical Necessity:     ·   Reason for Services/Other Comments:  ·    Use of outcome tool(s) and clinical judgement create a POC that gives a: LOW COMPLEXITY            TREATMENT:   (In addition to Assessment/Re-Assessment sessions the following treatments were rendered)     Pre-treatment Symptoms/Complaints:    Pain: Initial:   Pain Intensity 1: 1  Pain Location 1: Knee  Pain Orientation 1: Left  Pain Intervention(s) 1: Shower  Post Session:  1/10 rest,      Self Care: (40): Procedure(s) (per grid) utilized to improve and/or restore self-care/home management as related to dressing, food preparation, toileting and grooming.  Required moderate visual, verbal, manual and tactile cueing to facilitate activities of daily living skills and compensatory activities. Treatment/Session Assessment:     Response to Treatment:  tolerated well, may go home tomorrow. Education:  ? Home Exercises  ? Fall Precautions  ? Hip Precautions ? Going Home Video  ? Knee/Hip Prosthesis Review  ? Walker Management/Safety ? Adaptive Equipment as Needed       Interdisciplinary Collaboration:   o Physical Therapist  o Occupational Therapist  o Registered Nurse  o Physician  o PA    After treatment position/precautions:   o Up in chair  o Bed/Chair-wheels locked  o Caregiver at bedside  o Call light within reach  o RN notified  o Family at bedside     Compliance with Program/Exercises: Compliant all of the time. Recommendations/Intent for next treatment session:  Pt doing well all goals met and will do well at home with support from family. Patient will be discharged home with home health PT. No further Occupational Therapy warranted, will discharge Occupational Therapy services.         Total Treatment Duration:40  OT Patient Time In/Time Out  Time In: 1715  Time Out: [facility], OT

## 2019-10-23 NOTE — PROGRESS NOTES
October 23, 2019         Post Op day: 1 Day Post-Op     Admit Date: 10/22/2019  Admit Diagnosis: Unilateral primary osteoarthritis, left knee [M17.12]  Arthritis of knee, left [M17.12]        Subjective: having some pain this am, No SOB, No Chest Pain, No Nausea or Vomiting     Objective:   Vital Signs are Stable, No Acute Distress, Alert and Oriented, Dressing is Dry,  Neurovascular exam is normal.     Assessment / Plan :  Patient Active Problem List   Diagnosis Code    Microhematuria R31.29    Snoring R06.83    Atrial fibrillation (HCC) I48.91    Hypertension I10    Gastroesophageal reflux disease K21.9    Persistent disorder of initiating or maintaining sleep G47.00    Hypothyroidism E03.9    Bifascicular block I45.2    HLD (hyperlipidemia) E78.5    LVH (left ventricular hypertrophy) I51.7    Osteopenia M85.80    MR (mitral regurgitation) I34.0    Depression F32.9    GERD (gastroesophageal reflux disease) K21.9    TR (tricuspid regurgitation) I07.1    B12 deficiency E53.8    Asthma J45.909    Obstructive sleep apnea syndrome G47.33    Chest pain, unspecified R07.9    Palpitations R00.2    Syncope and collapse R55    Obesity E66.9    Anticoagulant long-term use Z79.01    Essential hypertension I10    High risk medication use Z79.899    Non morbid obesity with alveolar hypoventilation [id number]    Generalized anxiety disorder F41.1    On continuous oral anticoagulation Z79.01    BPPV (benign paroxysmal positional vertigo) H81.10    Hypovitaminosis D E55.9    Moderate persistent asthma without complication L67.18    Environmental allergies Z91.09    Obesity (BMI 30.0-34. 9) E66.9    Recurrent depression (HCC) F33.9    Fasting hyperglycemia R73.01    On potassium wasting diuretic therapy Z79.899    Elevated LDL cholesterol level E78.00    Irritable bowel syndrome with both constipation and diarrhea K58.2    Noncompliance with CPAP treatment Z91.14    Arthritis of knee, left M17.12  Total knee replacement status, left Z96.652      Patient Vitals for the past 8 hrs:   BP Temp Pulse Resp SpO2   10/23/19 0704 134/73 97.6 °F (36.4 °C) 78 16 96 %   10/23/19 0457 111/73 98 °F (36.7 °C) 96 18 98 %    Temp (24hrs), Av.9 °F (36.6 °C), Min:97.4 °F (36.3 °C), Max:98.3 °F (36.8 °C)    Body mass index is 34.11 kg/m².     Lab Results   Component Value Date/Time    HGB 10.6 (L) 10/22/2019 07:27 PM      Pt seen by and discussed with Supervising Physician   Continue PT  Will change oxycodone to norco  Home today vs tomorrow       Signed By: BISHNU Leiva

## 2019-10-23 NOTE — PROGRESS NOTES
Problem: Mobility Impaired (Adult and Pediatric)  Goal: *Acute Goals and Plan of Care (Insert Text)  Description  GOALS (1-4 days):  (1.)Ms. Isiah Anne will move from supine to sit and sit to supine  in bed with INDEPENDENT. (2.)Ms. Isiah Anne will transfer from bed to chair and chair to bed with INDEPENDENT using the least restrictive device. (3.)Ms. Chung will ambulate with INDEPENDENT for 250 feet with the least restrictive device. (4.)Ms. Chung will ambulate up/down 3 steps with bilateral  railing with MINIMAL ASSIST with no device. (5.)Ms. Chung will increase left knee ROM to 5°-80°.   ________________________________________________________________________________________________     Outcome: Progressing Towards Goal       PHYSICAL THERAPY JOINT CAMP TKA: Daily Note, Treatment Day: 1st and AM 10/23/2019  INPATIENT: Hospital Day: 2  Payor: Kati Modi / Plan: Tila Reyez / Product Type: [employer] /      NAME/AGE/GENDER: Yue Atkins is a 76 y.o. female   PRIMARY DIAGNOSIS:  Unilateral primary osteoarthritis, left knee [M17.12]   Procedure(s) and Anesthesia Type:     * LEFT KNEE ARTHROPLASTY TOTAL / Neda Part - Spinal (Left)  ICD-10: Treatment Diagnosis:    · Pain in Left Knee (M25.562)  · Stiffness of Left Knee, Not elsewhere classified (M25.662)  · Difficulty in walking, Not elsewhere classified (R26.2)  · Other abnormalities of gait and mobility (R26.89)      ASSESSMENT:     Ms. Isiah Anne presents with decreased independence with functional mobility. Pt performed exercises in supine. Pt performed supine to sit to stand. Pt took steps to bedside chair. 10/23--Pt performed sit to stand. Pt ambulated in room. Pt performed exercises in bedside chair. Pt's daughter and son present and supportive. This section established at most recent assessment   PROBLEM LIST (Impairments causing functional limitations):  1. Decreased Strength  2.  Decreased Transfer Abilities  3. Decreased Ambulation Ability/Technique  4. Decreased Balance  5. Increased Pain  6. Decreased Activity Tolerance  7. Decreased Flexibility/Joint Mobility   INTERVENTIONS PLANNED: (Benefits and precautions of physical therapy have been discussed with the patient.)  1. bed mobility  2. gait training  3. home exercise program (HEP)  4. Range of Motion: active/assisted/passive  5. Therapeutic Activities  6. therapeutic exercise/strengthening  7. transfer training  8. Group Therapy     TREATMENT PLAN: Frequency/Duration: Follow patient BID for duration of hospital stay to address above goals. Rehabilitation Potential For Stated Goals: Good     RECOMMENDED REHABILITATION/EQUIPMENT: (at time of discharge pending progress): Continue Skilled Therapy and Home Health: Physical Therapy. HISTORY:   History of Present Injury/Illness (Reason for Referral):  Pt is status post left total knee replacement.   Past Medical History/Comorbidities:   Ms. Isiah Anne  has a past medical history of Acquired stenosis of external ear canal unspecified as to cause (8/16/2011), Anemia (10/13/2010), Anticoagulant long-term use (06/08/2016), Anxiety and depression (6/23/2009), Arthritis, Asthma, Atrial fibrillation ([facility] [address] (2012), Atrophic vaginitis (5/2/2012), B12 deficiency (11/17/2010), Blood glucose elevated (2/3/2009), Chest pain, Chronic pain, Dysfunctional uterine bleeding (5/2/2012), Generalized anxiety disorder (10/21/2016), GERD (gastroesophageal reflux disease) (11/08/2012), High risk medication use (10/21/2016), HLD (hyperlipidemia) (2/3/2009), HTN (hypertension), Hypothyroidism (10/12/2009), IBS (irritable bowel syndrome) (7/5/2011), Insomnia (3/17/2010), Leaky heart valve, Leukocytosis (1/15/2014), LVH (left ventricular hypertrophy) (1/13/2009), Microscopic hematuria (4/16/2014), Obesity, Osteopenia (1/15/2014), Pernicious anemia (1/15/2014), Pneumonia (12/30/2009), Positive occult stool blood test (10/13/2010), Skin lesion (8/16/2011), Thrombocytopenia ([address] (2/28/2013), Unspecified sleep apnea, Urinary frequency (3/28/2013), and Vertigo (12/9/2010). She also has no past medical history of Aneurysm ([address] Autoimmune disease ([address] CAD (coronary artery disease), Cancer ([address] Chronic kidney disease, Chronic obstructive pulmonary disease ([address] Coagulation disorder ([address] Diabetes ([address] Difficult intubation, Endocarditis, Heart failure ([address] Liver disease, Malignant hyperthermia due to anesthesia, Nausea & vomiting, Nicotine vapor product user, Non-nicotine vapor product user, Pseudocholinesterase deficiency, PUD (peptic ulcer disease), Rheumatic fever, Seizures ([address] Stroke ([address] or Thromboembolus ([address] Ms. Karly Granger  has a past surgical history that includes pr cardiac surg procedure unlist; hx colonoscopy (2010); and us guided core breast biopsy (Right, 1966). Social History/Living Environment:   Home Environment: Private residence  One/Two Story Residence: One story  Living Alone: No  Support Systems: Family member(s)  Patient Expects to be Discharged to[de-identified] Private residence  Current DME Used/Available at Home: Cane, straight, Raised toilet seat  Tub or Shower Type: Shower  Prior Level of Function/Work/Activity:  Pt is independent with ambulation. Number of Personal Factors/Comorbidities that affect the Plan of Care: 0: LOW COMPLEXITY   EXAMINATION:   Most Recent Physical Functioning:                                 Transfers  Sit to Stand: Minimum assistance  Stand to Sit: Minimum assistance    Balance  Sitting: Intact  Standing: With support                 Distance (ft): 75 Feet (ft)  Ambulation - Level of Assistance: Minimal assistance  Assistive Device: Walker, rolling  Gait Abnormalities: Antalgic        Braces/Orthotics: none    Left Knee Cold  Type: Cryocuff      Body Structures Involved:  1. Bones  2. Joints  3. Muscles  4.  Ligaments Body Functions Affected:  1. Neuromusculoskeletal  2. Movement Related Activities and Participation Affected:  1. Mobility   Number of elements that affect the Plan of Care: 4+: HIGH COMPLEXITY   CLINICAL PRESENTATION:   Presentation: Stable and uncomplicated: LOW COMPLEXITY   CLINICAL DECISION MAKIN[address] [place], [place] [id number] AM-PAC 6 Clicks   Basic Mobility Inpatient Short Form  How much difficulty does the patient currently have. .. Unable A Lot A Little None   1. Turning over in bed (including adjusting bedclothes, sheets and blankets)? ? 1   ? 2   ? 3   ? 4   2. Sitting down on and standing up from a chair with arms ( e.g., wheelchair, bedside commode, etc.)   ? 1   ? 2   ? 3   ? 4   3. Moving from lying on back to sitting on the side of the bed?   ? 1   ? 2   ? 3   ? 4   How much help from another person does the patient currently need. .. Total A Lot A Little None   4. Moving to and from a bed to a chair (including a wheelchair)? ? 1   ? 2   ? 3   ? 4   5. Need to walk in hospital room? ? 1   ? 2   ? 3   ? 4   6. Climbing 3-5 steps with a railing? ? 1   ? 2   ? 3   ? 4   © , Trustees of [address] [place] [address], under license to [employer]. All rights reserved     Score:  Initial: 18 Most Recent: X (Date: -- )    Interpretation of Tool:  Represents activities that are increasingly more difficult (i.e. Bed mobility, Transfers, Gait). Medical Necessity:     · Skilled intervention continues to be required due to decreased mobility ability. Reason for Services/Other Comments:  · Patient continues to require skilled intervention due to decreased mobility ability.    · .   Use of outcome tool(s) and clinical judgement create a POC that gives a: Clear prediction of patient's progress: LOW COMPLEXITY            TREATMENT:   (In addition to Assessment/Re-Assessment sessions the following treatments were rendered)     Pre-treatment Symptoms/Complaints:    Pain Initial:   Pain Intensity 1: 1  Pain Location 1: Knee  Pain Orientation 1: Left  Post Session:  No complaints     Therapeutic Exercise: ( 8 minutes):  Exercises per grid below to improve mobility. Gait Training (  15 minutes):  Gait training to improve and/or restore physical functioning as related to mobility. Assistive Device: Walker, rolling  Ambulation - Level of Assistance: Minimal assistance  Distance (ft): 75 Feet (ft)       Date:  10/22 Date:  10/23 Date:     ACTIVITY/EXERCISE AM PM AM PM AM PM   GROUP THERAPY  ?  ?  ?  ?  ?  ? Ankle Pumps  10 10      Quad Sets  10 10      Gluteal Sets  10 10      Hip ABd/ADduction  10 10      Straight Leg Raises  10 10      Knee Slides  10 10      Short Arc Quads         Long Arc Quads         Chair Slides                  B = bilateral; AA = active assistive; A = active; P = passive      Treatment/Session Assessment:     Response to Treatment:  Pt agreeable to exercise and ambulate. Education:  ? Home Exercises  ? Fall Precautions  ? ? D/C Instruction Review  ? Knee Prosthesis Review  ? Walker Management/Safety ? Adaptive Equipment as Needed       Interdisciplinary Collaboration:   o Physical Therapist  o Occupational Therapist  o Registered Nurse    After treatment position/precautions:   o Up in chair  o Bed/Chair-wheels locked  o Bed in low position  o Caregiver at bedside  o Call light within reach  o RN notified  o Family at bedside    Compliance with Program/Exercises: Compliant most of the time, Will assess as treatment progresses. Recommendations/Intent for next treatment session:  Treatment next visit will focus on increasing Ms. Chung's independence with bed mobility, transfers, gait training, strength/ROM exercises, modalities for pain, and patient education.       Total Treatment Duration:  PT Patient Time In/Time Out  Time In: 0830  Time Out: Shan Harry PT

## 2019-10-23 NOTE — PROGRESS NOTES
10/23/19 0926   Oxygen Therapy   O2 Sat (%) 98 %   Pulse via Oximetry 74 beats per minute   O2 Device Room air   Incentive Spirometry Treatment   Actual Volume (ml) 1250 ml   Number of Attempts 3   Patient achieved         1250      Ml/sec on IS. Patient encouraged to do every hour while awake-patient agreed and demonstrated. No shortness of breath or distress noted.

## 2019-10-23 NOTE — PROGRESS NOTES
10/22/19 2126   Oxygen Therapy   O2 Sat (%) 93 %   Pulse via Oximetry 73 beats per minute   O2 Device Room air   Patient placed on continuous sat monitor. Alarms set and data cleared. No distress noted at this time. Patient refusing O2 at this time stating her sleep apnea \"isn't bad enough and it will bother her\". I explained benefits and possibility of her O2 sats dropping and being placed on O2 during the night. Patient understands.

## 2019-10-24 VITALS
HEART RATE: 87 BPM | HEIGHT: 68 IN | SYSTOLIC BLOOD PRESSURE: 128 MMHG | TEMPERATURE: 97.6 F | DIASTOLIC BLOOD PRESSURE: 88 MMHG | BODY MASS INDEX: 34 KG/M2 | WEIGHT: 224.31 LBS | OXYGEN SATURATION: 94 % | RESPIRATION RATE: 16 BRPM

## 2019-10-24 PROCEDURE — 97150 GROUP THERAPEUTIC PROCEDURES: CPT

## 2019-10-24 PROCEDURE — 94760 N-INVAS EAR/PLS OXIMETRY 1: CPT

## 2019-10-24 PROCEDURE — 97116 GAIT TRAINING THERAPY: CPT

## 2019-10-24 PROCEDURE — 74011250637 HC RX REV CODE- 250/637: Performed by: ORTHOPAEDIC SURGERY

## 2019-10-24 RX ADMIN — SENNOSIDES AND DOCUSATE SODIUM 2 TABLET: 8.6; 5 TABLET ORAL at 08:52

## 2019-10-24 RX ADMIN — ASPIRIN 81 MG: 81 TABLET, COATED ORAL at 08:52

## 2019-10-24 RX ADMIN — PANTOPRAZOLE SODIUM 40 MG: 40 TABLET, DELAYED RELEASE ORAL at 05:06

## 2019-10-24 RX ADMIN — LEVOTHYROXINE SODIUM 112 MCG: 112 TABLET ORAL at 05:06

## 2019-10-24 RX ADMIN — HYDROCODONE BITARTRATE AND ACETAMINOPHEN 1 TABLET: 7.5; 325 TABLET ORAL at 09:14

## 2019-10-24 RX ADMIN — HYDROCODONE BITARTRATE AND ACETAMINOPHEN 1 TABLET: 7.5; 325 TABLET ORAL at 05:05

## 2019-10-24 RX ADMIN — CELECOXIB 200 MG: 200 CAPSULE ORAL at 08:52

## 2019-10-24 RX ADMIN — BUDESONIDE AND FORMOTEROL FUMARATE DIHYDRATE 2 PUFF: 160; 4.5 AEROSOL RESPIRATORY (INHALATION) at 08:24

## 2019-10-24 NOTE — PROGRESS NOTES
Problem: Mobility Impaired (Adult and Pediatric)  Goal: *Acute Goals and Plan of Care (Insert Text)  Description  GOALS (1-4 days):  (1.)Ms. Dawson Marshall will move from supine to sit and sit to supine  in bed with INDEPENDENT. (2.)Ms. Dawson Marshall will transfer from bed to chair and chair to bed with INDEPENDENT using the least restrictive device. (3.)Ms. Chung will ambulate with INDEPENDENT for 250 feet with the least restrictive device. (4.)Ms. Chung will ambulate up/down 3 steps with bilateral  railing with MINIMAL ASSIST with no device. (5.)Ms. Chung will increase left knee ROM to 5°-80°.   ________________________________________________________________________________________________     Outcome: Progressing Towards Goal       PHYSICAL THERAPY JOINT CAMP TKA: Daily Note, Treatment Day: 2nd and AM 10/24/2019  INPATIENT: Hospital Day: 3  Payor: Miller Davis / Plan: Sada Corea / Product Type: [employer] /      NAME/AGE/GENDER: Robert Braden is a 76 y.o. female   PRIMARY DIAGNOSIS:  Unilateral primary osteoarthritis, left knee [M17.12]   Procedure(s) and Anesthesia Type:     * LEFT KNEE ARTHROPLASTY TOTAL / Hernandez Hartwick - Spinal (Left)  ICD-10: Treatment Diagnosis:    · Pain in Left Knee (M25.562)  · Stiffness of Left Knee, Not elsewhere classified (M25.662)  · Difficulty in walking, Not elsewhere classified (R26.2)  · Other abnormalities of gait and mobility (R26.89)      ASSESSMENT:     Ms. Dawson Mrashall presents up in chair on contact and agreeable to therapy. Worked on gait training in the waller with verbal cues. In gym worked on TKA exercises with verbal cues progressing with repetitions. Reviewed HEP and use of ice along with frequency. Pt practiced stairs with verbal cues and then walked back to room. Ice on knee and needs in reach. Pt plans to go home today with HHPT for follow up.      This section established at most recent assessment   PROBLEM LIST (Impairments causing functional limitations):  1. Decreased Strength  2. Decreased Transfer Abilities  3. Decreased Ambulation Ability/Technique  4. Decreased Balance  5. Increased Pain  6. Decreased Activity Tolerance  7. Decreased Flexibility/Joint Mobility   INTERVENTIONS PLANNED: (Benefits and precautions of physical therapy have been discussed with the patient.)  1. bed mobility  2. gait training  3. home exercise program (HEP)  4. Range of Motion: active/assisted/passive  5. Therapeutic Activities  6. therapeutic exercise/strengthening  7. transfer training  8. Group Therapy     TREATMENT PLAN: Frequency/Duration: Follow patient BID for duration of hospital stay to address above goals. Rehabilitation Potential For Stated Goals: Good     RECOMMENDED REHABILITATION/EQUIPMENT: (at time of discharge pending progress): Continue Skilled Therapy and Home Health: Physical Therapy. HISTORY:   History of Present Injury/Illness (Reason for Referral):  Pt is status post left total knee replacement.   Past Medical History/Comorbidities:   Ms. 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 (8/16/2011), Anemia (10/13/2010), Anticoagulant long-term use (06/08/2016), Anxiety and depression (6/23/2009), Arthritis, Asthma, Atrial fibrillation ([facility] [address] (2012), Atrophic vaginitis (5/2/2012), B12 deficiency (11/17/2010), Blood glucose elevated (2/3/2009), Chest pain, Chronic pain, Dysfunctional uterine bleeding (5/2/2012), Generalized anxiety disorder (10/21/2016), GERD (gastroesophageal reflux disease) (11/08/2012), High risk medication use (10/21/2016), HLD (hyperlipidemia) (2/3/2009), HTN (hypertension), Hypothyroidism (10/12/2009), IBS (irritable bowel syndrome) (7/5/2011), Insomnia (3/17/2010), Leaky heart valve, Leukocytosis (1/15/2014), LVH (left ventricular hypertrophy) (1/13/2009), Microscopic hematuria (4/16/2014), Obesity, Osteopenia (1/15/2014), Pernicious anemia (1/15/2014), Pneumonia (12/30/2009), Positive occult stool blood test (10/13/2010), Skin lesion (8/16/2011), Thrombocytopenia ([address] (2/28/2013), Unspecified sleep apnea, Urinary frequency (3/28/2013), and Vertigo (12/9/2010). She also has no past medical history of Aneurysm ([address] Autoimmune disease ([address] CAD (coronary artery disease), Cancer ([address] Chronic kidney disease, Chronic obstructive pulmonary disease ([address] Coagulation disorder ([address] Diabetes ([address] Difficult intubation, Endocarditis, Heart failure ([address] Liver disease, Malignant hyperthermia due to anesthesia, Nausea & vomiting, Nicotine vapor product user, Non-nicotine vapor product user, Pseudocholinesterase deficiency, PUD (peptic ulcer disease), Rheumatic fever, Seizures ([address] Stroke ([address] or Thromboembolus ([address] Ms. Avial Pena  has a past surgical history that includes pr cardiac surg procedure unlist; hx colonoscopy (2010); and us guided core breast biopsy (Right, 1966). Social History/Living Environment:   Home Environment: Private residence  One/Two Story Residence: One story  Living Alone: No  Support Systems: Family member(s)  Patient Expects to be Discharged to[de-identified] Private residence  Current DME Used/Available at Home: Cane, straight, Raised toilet seat  Tub or Shower Type: Shower  Prior Level of Function/Work/Activity:  Pt is independent with ambulation. Number of Personal Factors/Comorbidities that affect the Plan of Care: 0: LOW COMPLEXITY   EXAMINATION:   Most Recent Physical Functioning:                 LLE AROM  L Knee Flexion: 95  L Knee Extension: 7               Transfers  Sit to Stand: Stand-by assistance; Additional time  Stand to Sit: Stand-by assistance    Balance  Sitting: Intact  Standing: Pull to stand; With support         Gait Training: Yes    Weight Bearing Status  Left Side Weight Bearing: As tolerated  Distance (ft): 274 Feet (ft)(and another 274 feet)  Ambulation - Level of Assistance: Stand-by assistance  Assistive Device: Walker, rolling  Gait Abnormalities: Antalgic;Decreased step clearance  Number of Stairs Trained: 3  Stairs - Level of Assistance: Contact guard assistance  Rail Use: Right      Braces/Orthotics: none    Left Knee Cold  Type: Cryocuff      Body Structures Involved:  1. Bones  2. Joints  3. Muscles  4. Ligaments Body Functions Affected:  1. Neuromusculoskeletal  2. Movement Related Activities and Participation Affected:  1. Mobility   Number of elements that affect the Plan of Care: 4+: HIGH COMPLEXITY   CLINICAL PRESENTATION:   Presentation: Stable and uncomplicated: LOW COMPLEXITY   CLINICAL DECISION MAKIN[address] [place] [address] AM-PAC 6 Clicks   Basic Mobility Inpatient Short Form  How much difficulty does the patient currently have. .. Unable A Lot A Little None   1. Turning over in bed (including adjusting bedclothes, sheets and blankets)? ? 1   ? 2   ? 3   ? 4   2. Sitting down on and standing up from a chair with arms ( e.g., wheelchair, bedside commode, etc.)   ? 1   ? 2   ? 3   ? 4   3. Moving from lying on back to sitting on the side of the bed?   ? 1   ? 2   ? 3   ? 4   How much help from another person does the patient currently need. .. Total A Lot A Little None   4. Moving to and from a bed to a chair (including a wheelchair)? ? 1   ? 2   ? 3   ? 4   5. Need to walk in hospital room? ? 1   ? 2   ? 3   ? 4   6. Climbing 3-5 steps with a railing? ? 1   ? 2   ? 3   ? 4   © , Trustees of [address] [place] [address], under license to [employer]. All rights reserved     Score:  Initial: 18 Most Recent: X (Date: -- )    Interpretation of Tool:  Represents activities that are increasingly more difficult (i.e. Bed mobility, Transfers, Gait). Medical Necessity:     · Skilled intervention continues to be required due to decreased mobility ability. Reason for Services/Other Comments:  · Patient continues to require skilled intervention due to decreased mobility ability.    · .   Use of outcome tool(s) and clinical judgement create a POC that gives a: Clear prediction of patient's progress: LOW COMPLEXITY            TREATMENT:   (In addition to Assessment/Re-Assessment sessions the following treatments were rendered)     Pre-treatment Symptoms/Complaints:    Pain Initial: sore today     Post Session:  Sore after therapy, ice on knee     Gait Training (15 Minutes):  Gait training to improve and/or restore physical functioning as related to mobility and strength. Ambulated 274 Feet (ft)(and another 274 feet) with Stand-by assistance using a Walker, rolling and minimal   related to their stance phase and stride length to promote proper body alignment and promote proper body posture. Instruction in performance of walker use and gait sequencingy to correct stance phase and stride length. Therapeutic Exercise: (45 Minutes(group)):  Exercises per grid below to improve mobility and strength. Required minimal verbal and manual cues to promote proper body alignment and promote proper body posture. Progressed repetitions as indicated. Date:  10/22 Date:  10/23 Date:  10/24/19   ACTIVITY/EXERCISE AM PM AM PM AM PM   GROUP THERAPY  ? ?  ?  x  X  ? Ankle Pumps  10 10 15 20    Quad Sets  10 10 15 20    Gluteal Sets  10 10 15 20    Hip ABd/ADduction  10 10 15 20    Straight Leg Raises  10 10 15 20    Knee Slides  10 10 15 20    Short Arc Quads    15 20    Long Arc Quads         Chair Slides    15 20             B = bilateral; AA = active assistive; A = active; P = passive      Treatment/Session Assessment:     Response to Treatment:  Pt doing well, hopes to go home today with HHPT for follow up    Education:  ? Home Exercises  ? Fall Precautions  X no pillow under knee  X D/C Instruction Review  x Knee Prosthesis Review  ? Walker Management/Safety ?  Adaptive Equipment as Needed       Interdisciplinary Collaboration:   o Registered Nurse  o Rehabilitation Attendant    After treatment position/precautions: o Up in chair  o Bed/Chair-wheels locked  o Call light within reach  o RN notified  o Family at bedside    Compliance with Program/Exercises: Compliant all of the time. Recommendations/Intent for next treatment session:  Treatment next visit will focus on increasing Ms. Chung's independence with bed mobility, transfers, gait training, strength/ROM exercises, modalities for pain, and patient education.       Total Treatment Duration:  PT Patient Time In/Time Out  Time In: 0930  Time Out: Jalil Forte[address] [place] [address]

## 2019-10-24 NOTE — PROGRESS NOTES
October 24, 2019         Post Op day: 2 Days Post-Op     Admit Date: 10/22/2019  Admit Diagnosis: Unilateral primary osteoarthritis, left knee [M17.12]  Arthritis of knee, left [M17.12]        Subjective: Doing well, No complaints, No SOB, No Chest Pain, No Nausea or Vomiting     Objective:   Vital Signs are Stable, No Acute Distress, Alert and Oriented, Dressing is Dry,  Neurovascular exam is normal.     Assessment / Plan :  Patient Active Problem List   Diagnosis Code    Microhematuria R31.29    Snoring R06.83    Atrial fibrillation (HCC) I48.91    Hypertension I10    Gastroesophageal reflux disease K21.9    Persistent disorder of initiating or maintaining sleep G47.00    Hypothyroidism E03.9    Bifascicular block I45.2    HLD (hyperlipidemia) E78.5    LVH (left ventricular hypertrophy) I51.7    Osteopenia M85.80    MR (mitral regurgitation) I34.0    Depression F32.9    GERD (gastroesophageal reflux disease) K21.9    TR (tricuspid regurgitation) I07.1    B12 deficiency E53.8    Asthma J45.909    Obstructive sleep apnea syndrome G47.33    Chest pain, unspecified R07.9    Palpitations R00.2    Syncope and collapse R55    Obesity E66.9    Anticoagulant long-term use Z79.01    Essential hypertension I10    High risk medication use Z79.899    Non morbid obesity with alveolar hypoventilation [id number]    Generalized anxiety disorder F41.1    On continuous oral anticoagulation Z79.01    BPPV (benign paroxysmal positional vertigo) H81.10    Hypovitaminosis D E55.9    Moderate persistent asthma without complication P79.18    Environmental allergies Z91.09    Obesity (BMI 30.0-34. 9) E66.9    Recurrent depression (HCC) F33.9    Fasting hyperglycemia R73.01    On potassium wasting diuretic therapy Z79.899    Elevated LDL cholesterol level E78.00    Irritable bowel syndrome with both constipation and diarrhea K58.2    Noncompliance with CPAP treatment Z91.14    Arthritis of knee, left M17.12    Total knee replacement status, left Z96.658      Patient Vitals for the past 8 hrs:   BP Temp Pulse Resp SpO2   10/24/19 0533 95/58 97.4 °F (36.3 °C) 73 16 92 %   10/24/19 0110 90/50 97.8 °F (36.6 °C) 75 16 91 %    Temp (24hrs), Av.7 °F (36.5 °C), Min:97.4 °F (36.3 °C), Max:98.1 °F (36.7 °C)    Body mass index is 34.11 kg/m².     Lab Results   Component Value Date/Time    HGB 10.6 (L) 10/22/2019 07:27 PM      Pt seen by and discussed with [address] today        Signed By: BISHNU North

## 2019-10-24 NOTE — DISCHARGE INSTRUCTIONS
[address] [facility]   Patient Discharge Instructions    Julio Cesar Paul / [id number] : 1945    Admitted 10/22/2019 Discharged: 10/24/2019     IF YOU HAVE ANY PROBLEMS ONCE YOU ARE AT HOME CALL THE FOLLOWING NUMBERS:   Main office number: [phone]    Take Home Medications       · It is important that you take the medication exactly as they are prescribed. · Keep your medication in the bottles provided by the pharmacist and keep a list of the medication names, dosages, and times to be taken in your wallet. · Do not take other medications without consulting your doctor. What to do at [address] your prehospital diet. If you have excessive nausea or vomitting call your doctor's office     Home Physical Therapy is arranged. Use rolling walker when walking. Patients who have had a joint replacement should not drive until you are seen for your follow up appointment by Dr. Roxana Will. When to Call    - Call if you have a temperature greater then 101  - Unable to keep food down  - Loose control of your bladder or bowel function  - Are unable to bear any weight   - Need a pain medication refill       DISCHARGE SUMMARY from Nurse    The following personal items collected during your admission are returned to you:   Dental Appliance: Dental Appliances: None  Vision: Visual Aid: Glasses  Hearing Aid:    Jewelry: Jewelry: None  Clothing: Clothing: Other (comment)  Other Valuables: Other Valuables: Cell Phone, Hedy Berrios(PT'S DAUGHTER HAS ALL VALUABLES)  Valuables sent to safe:      PATIENT INSTRUCTIONS:    After general anesthesia or intravenous sedation, for 24 hours or while taking prescription Narcotics:  · Limit your activities  · Do not drive and operate hazardous machinery  · Do not make important personal or business decisions  · Do  not drink alcoholic beverages  · If you have not urinated within 8 hours after discharge, please contact your surgeon on call.     Report the following to your surgeon:  · Excessive pain, swelling, redness or odor of or around the surgical area  · Temperature over 101  · Nausea and vomiting lasting longer than 4 hours or if unable to take medications  · Any signs of decreased circulation or nerve impairment to extremity: change in color, persistent  numbness, tingling, coldness or increase pain  · Any questions, call office @ [phone]      Keep scheduled follow up appointment. If need to change, call office @ [phone]. *  Please give a list of your current medications to your Primary Care Provider. *  Please update this list whenever your medications are discontinued, doses are      changed, or new medications (including over-the-counter products) are added. *  Please carry medication information at all times in case of emergency situations. Patient Education        Total Knee Replacement: What to Expect at Home  Your Recovery    When you leave the hospital, you should be able to move around with a walker or crutches. But you will need someone to help you at home for the next few weeks or until you have more energy and can move around better. If you need more extensive rehab, you may go to a specialized rehab center for more treatment. You will go home with a bandage and stitches, staples, tissue glue, or tape strips. Change the bandage as your doctor tells you to. If you have stitches or staples, your doctor will remove them 10 to 21 days after your surgery. Glue or tape strips will fall off on their own over time. You may still have some mild pain, and the area may be swollen for 3 to 6 months after surgery. Your knee will continue to improve for 6 to 12 months. You will probably use a walker for 1 to 3 weeks and then use crutches. When you are ready, you can use a cane. You will probably be able to walk on your own in 4 to 8 weeks. You will need to do months of physical rehabilitation (rehab) after a knee replacement.  Rehab will help you strengthen the muscles of the knee and help you regain movement. After you recover, your artificial knee will allow you to do normal daily activities with less pain or no pain at all. You may be able to hike, dance, ride a bike, and play golf. Talk to your doctor about whether you can do more strenuous activities. Always tell your caregivers that you have an artificial knee. How long it will take to walk on your own, return to normal activities, and go back to work depends on your health and how well your rehabilitation (rehab) program goes. The better you do with your rehab exercises, the quicker you will get your strength and movement back. This care sheet gives you a general idea about how long it will take for you to recover. But each person recovers at a different pace. Follow the steps below to get better as quickly as possible. How can you care for yourself at home? Activity    · Rest when you feel tired. You may take a nap, but do not stay in bed all day. When you sit, use a chair with arms. You can use the arms to help you stand up.     · Work with your physical therapist to find the best way to exercise. What you can do as your knee heals will depend on whether your new knee is cemented or uncemented. You may not be able to do certain things for a while if your new knee is uncemented.     · After your knee has healed enough, you can do more strenuous activities with caution. ? You can golf, but use a golf cart, and do not wear shoes with spikes. ? You can bike on a flat road or on a stationary bike. Avoid biking up hills. ? Your doctor may suggest that you stay away from activities that put stress on your knee. These include tennis or badminton, squash or racquetball, contact sports like football, jumping (such as in basketball), jogging, or running. ? Avoid activities where you might fall.  These include horseback riding, skiing, and mountain biking.     · Do not sit for more than 1 hour at a time. Get up and walk around for a while before you sit again. If you must sit for a long time, prop up your leg with a chair or footstool. This will help you avoid swelling.     · Ask your doctor when you can drive again. It may take up to 8 weeks after knee replacement surgery before it is safe for you to drive.     · When you get into a car, sit on the edge of the seat. Then pull in your legs, and turn to face the front.     · You should be able to do many everyday activities 3 to 6 weeks after your surgery. You will probably need to take 4 to 16 weeks off from work. When you can go back to work depends on the type of work you do and how you feel.     · Ask your doctor when it is okay for you to have sex.     · Do not lift anything heavier than 10 pounds and do not lift weights for 12 weeks. Diet    · By the time you leave the hospital, you should be eating your normal diet. If your stomach is upset, try bland, low-fat foods like plain rice, broiled chicken, toast, and yogurt. Your doctor may suggest that you take iron and vitamin supplements.     · Drink plenty of fluids (unless your doctor tells you not to).   · Eat healthy foods, and watch your portion sizes. Try to stay at your ideal weight. Too much weight puts more stress on your new knee.     · You may notice that your bowel movements are not regular right after your surgery. This is common. Try to avoid constipation and straining with bowel movements. You may want to take a fiber supplement every day. If you have not had a bowel movement after a couple of days, ask your doctor about taking a mild laxative. Medicines    · Your doctor will tell you if and when you can restart your medicines. He or she will also give you instructions about taking any new medicines.     · If you take blood thinners, such as warfarin (Coumadin), clopidogrel (Plavix), or aspirin, be sure to talk to your doctor.  He or she will tell you if and when to start taking those medicines again. Make sure that you understand exactly what your doctor wants you to do.     · Your doctor may give you a blood-thinning medicine to prevent blood clots. If you take a blood thinner, be sure you get instructions about how to take your medicine safely. Blood thinners can cause serious bleeding problems. This medicine could be in pill form or as a shot (injection). If a shot is necessary, your doctor will tell you how to do this.     · Be safe with medicines. Take pain medicines exactly as directed. ? If the doctor gave you a prescription medicine for pain, take it as prescribed. ? If you are not taking a prescription pain medicine, ask your doctor if you can take an over-the-counter medicine. ? Plan to take your pain medicine 30 minutes before exercises. It is easier to prevent pain before it starts than to stop it once it has started.     · If you think your pain medicine is making you sick to your stomach:  ? Take your medicine after meals (unless your doctor has told you not to). ? Ask your doctor for a different pain medicine.     · If your doctor prescribed antibiotics, take them as directed. Do not stop taking them just because you feel better. You need to take the full course of antibiotics. Incision care    · If your doctor told you how to care for your cut (incision), follow your doctor's instructions. You will have a dressing over the cut. A dressing helps the incision heal and protects it. Your doctor will tell you how to take care of this.     · If you did not get instructions, follow this general advice:  ? If you have strips of tape on the cut the doctor made, leave the tape on for a week or until it falls off.  ? If you have stitches or staples, your doctor will tell you when to come back to have them removed. ? If you have skin adhesive on the cut, leave it on until it falls off. Skin adhesive is also called glue or liquid stitches. ? Change the bandage every day. ?  Wash the area daily with warm water, and pat it dry. Don't use hydrogen peroxide or alcohol. They can slow healing. ? You may cover the area with a gauze bandage if it oozes fluid or rubs against clothing. ? You may shower 24 to 48 hours after surgery. Pat the incision dry. Don't swim or take a bath for the first 2 weeks, or until your doctor tells you it is okay. Exercise    · Your rehab program will give you a number of exercises to do to help you get back your knee's range of motion and strength. Always do them as your therapist tells you. Ice and elevation    · For pain and swelling, put ice or a cold pack on the area for 10 to 20 minutes at a time. Put a thin cloth between the ice and your skin. Other instructions    · Continue to wear your support stockings as your doctor says. These help to prevent blood clots. The length of time that you will have to wear them depends on your activity level and the amount of swelling.     · You have metal pieces in your knee. These may set off some airport metal detectors. Carry a medical alert card that says you have an artificial joint, just in case. Follow-up care is a key part of your treatment and safety. Be sure to make and go to all appointments, and call your doctor if you are having problems. It's also a good idea to know your test results and keep a list of the medicines you take. When should you call for help? Call 911 anytime you think you may need emergency care. For example, call if:    · You passed out (lost consciousness).     · You have severe trouble breathing.     · You have sudden chest pain and shortness of breath, or you cough up blood.    Call your doctor now or seek immediate medical care if:    · You have signs of infection, such as:  ? Increased pain, swelling, warmth, or redness. ? Red streaks leading from the incision. ? Pus draining from the incision. ?  A fever.     · You have signs of a blood clot, such as:  ? Pain in your calf, back of the knee, thigh, or groin. ? Redness and swelling in your leg or groin.     · Your incision comes open and begins to bleed, or the bleeding increases.     · You have pain that does not get better after you take pain medicine.    Watch closely for changes in your health, and be sure to contact your doctor if:    · You do not have a bowel movement after taking a laxative. Where can you learn more? Go to http://celio-brown.info/. Enter O500 in the search box to learn more about \"Total Knee Replacement: What to Expect at Home. \"  Current as of: June 26, 2019  Content Version: 12.2  © 4206-3191 [employer]. Care instructions adapted under license by "[employer] (which disclaims liability or warranty for this information). If you have questions about a medical condition or this instruction, always ask your healthcare professional. [place] [address] any warranty or liability for your use of this information. These are general instructions for a healthy lifestyle:    No smoking/ No tobacco products/ Avoid exposure to second hand smoke    Surgeon General's Warning:  Quitting smoking now greatly reduces serious risk to your health. Obesity, smoking, and sedentary lifestyle greatly increases your risk for illness    A healthy diet, regular physical exercise & weight monitoring are important for maintaining a healthy lifestyle    You may be retaining fluid if you have a history of heart failure or if you experience any of the following symptoms:  Weight gain of 3 pounds or more overnight or 5 pounds in a week, increased swelling in our hands or feet or shortness of breath while lying flat in bed. Please call your doctor as soon as you notice any of these symptoms; do not wait until your next office visit.     Recognize signs and symptoms of STROKE:    F-face looks uneven    A-arms unable to move or move even    S-speech slurred or non-existent    T-time-call 911 as soon as signs and symptoms begin-DO NOT go       Back to bed or wait to see if you get better-TIME IS BRAIN. The discharge information has been reviewed with the patient. The patient verbalized understanding. Information obtained by :  I understand that if any problems occur once I am at home I am to contact my physician. I understand and acknowledge receipt of the instructions indicated above.                                                                                                                                            Physician's or R.N.'s Signature                                                                  Date/Time                                                                                                                                              Patient or Representative Signature                                                          Date/Time

## 2019-10-25 ENCOUNTER — HOME CARE VISIT (OUTPATIENT)
Dept: SCHEDULING | Facility: HOME HEALTH | Age: 74
End: 2019-10-25
Payer: MEDICARE

## 2019-10-25 ENCOUNTER — PATIENT OUTREACH (OUTPATIENT)
Dept: CASE MANAGEMENT | Age: 74
End: 2019-10-25

## 2019-10-25 VITALS
HEART RATE: 72 BPM | DIASTOLIC BLOOD PRESSURE: 74 MMHG | TEMPERATURE: 99.3 F | SYSTOLIC BLOOD PRESSURE: 134 MMHG | RESPIRATION RATE: 16 BRPM

## 2019-10-25 PROCEDURE — G0151 HHCP-SERV OF PT,EA 15 MIN: HCPCS

## 2019-10-25 PROCEDURE — 400013 HH SOC

## 2019-10-25 NOTE — PROGRESS NOTES
This note will not be viewable in [address] Transition of Care Discharge Follow-up Questionnaire   Date/Time of Call:   10/25/19 10:20 AM    What was the patient hospitalized for? Left TKR   Does the patient understand his/her diagnosis and/or treatment and what happened during the hospitalization? Spoke with patient. She states she does understand her diagnosis and treatment that occurred during her hospital admission. Did the patient receive discharge instructions? Yes, patient states she did receive discharge instructions. CM Assessed Risk for Readmission:       Patient stated Risk for Readmission:      Low/medium risk for readmissions. Patient did not state any problems or concerns that would indicate any risk for readmission. Review any discharge instructions (see discharge instructions/AVS in [facility]). Ask patient if they understand these. Do they have any questions? Yes    Discharge instructions reviewed with patient. She does understand the instructions. She did not verbalize any questions at this time. Were home services ordered (nursing, PT, OT, ST, etc.)? [facility], [place], PT/OT   If so, has the first visit occurred? If not, why? (Assist with coordination of services if necessary.)   Patient states they are coming to her home at 1 PM today. Was any DME ordered? No   If so, has it been received? If not, why?  (Assist patient in obtaining DME orders &/or equipment if necessary.) N/A       Complete a review of all medications (new, continued and discontinued meds per the D/C instructions and medication tab in [facility]).  START taking:  aspirin delayed-release 81 mg tablet  HYDROcodone-acetaminophen 7.5-325 mg per tablet (NORCO)  methocarbamol 750 mg tablet (ROBAXIN)  promethazine 25 mg tablet (PHENERGAN)  CHANGE how you take:  rivaroxaban 20 mg Tab tablet (XARELTO)  STOP taking:  vitamin e 600 unit capsule    Resume All Previous Medications   Were all new prescriptions filled? If not, why?  (Assist patient in obtaining medications if necessary  escalate for CCM &/or SW if ongoing issues are verbalized by pt or anticipated)   Yes   Does the patient understand the purpose and dosing instructions for all medications? (If patient has questions, provide explanation and education.)   Yes     Does the patient have any problems in performing ADLs? (If patient is unable to perform ADLs  what is the limiting factor(s)? Do they have a support system that can assist? If no support system is present, discuss possible assistance that they may be able to obtain. Escalate for CCM/SW if ongoing issues are verbalized by pt or anticipated)     She is independent with ADLs. She does have family/friends support if needed. Does the patient have all follow-up appointments scheduled? 7 day f/up with PCP?   (f/up with PCP may be w/in 14 days if patient has a f/up with their specialist w/in 7 days)    7-14 day f/up with specialist?   (or per discharge instructions)    If f/up has not been made  what actions has the care coordinator made to accomplish this? Has transportation been arranged? Yes, See Below      Dr Nik Weston 12/23/19 @ 1:30 PM       Dr Rosalinda Hodges              Patient denies any transportation issues at this time. Any other questions or concerns expressed by the patient? Patient did not express any questions or concerns at time of call. She did state that she is weak and nauseated this morning. Encouraged her to call Dr Christopher Guzman office and let them know how she is feeling and that he may change some of her meds or give her something to help her feel better. She states she will have her daughter call the office. Schedule next appointment with GIACOMO VIDAL Coordinator or refer to RN Case Manager/ per the workflow guidelines. When is care coordinators next follow-up call scheduled?       If referred for CCM  what RN care manager was the referral assigned? Contact information for Care Coordinator given and instructed to call with any Questions or concerns.         7 to 14 days per MARCIANO protocol   MARCIANO Call Completed By: Oleg Reyes LPN Care Coordinator

## 2019-10-28 ENCOUNTER — HOME CARE VISIT (OUTPATIENT)
Dept: SCHEDULING | Facility: HOME HEALTH | Age: 74
End: 2019-10-28
Payer: MEDICARE

## 2019-10-28 VITALS
DIASTOLIC BLOOD PRESSURE: 70 MMHG | RESPIRATION RATE: 18 BRPM | TEMPERATURE: 99.1 F | HEART RATE: 80 BPM | SYSTOLIC BLOOD PRESSURE: 126 MMHG

## 2019-10-28 PROCEDURE — G0151 HHCP-SERV OF PT,EA 15 MIN: HCPCS

## 2019-10-30 ENCOUNTER — HOME CARE VISIT (OUTPATIENT)
Dept: SCHEDULING | Facility: HOME HEALTH | Age: 74
End: 2019-10-30
Payer: MEDICARE

## 2019-10-30 VITALS — HEART RATE: 80 BPM | TEMPERATURE: 98 F | DIASTOLIC BLOOD PRESSURE: 80 MMHG | SYSTOLIC BLOOD PRESSURE: 140 MMHG

## 2019-10-30 PROCEDURE — G0157 HHC PT ASSISTANT EA 15: HCPCS

## 2019-10-31 ENCOUNTER — HOME CARE VISIT (OUTPATIENT)
Dept: HOME HEALTH SERVICES | Facility: HOME HEALTH | Age: 74
End: 2019-10-31
Payer: MEDICARE

## 2019-11-01 ENCOUNTER — HOME CARE VISIT (OUTPATIENT)
Dept: SCHEDULING | Facility: HOME HEALTH | Age: 74
End: 2019-11-01
Payer: MEDICARE

## 2019-11-01 VITALS — DIASTOLIC BLOOD PRESSURE: 82 MMHG | HEART RATE: 86 BPM | TEMPERATURE: 98.4 F | SYSTOLIC BLOOD PRESSURE: 142 MMHG

## 2019-11-01 PROCEDURE — A6199 ALGINATE DRSG WOUND FILLER: HCPCS

## 2019-11-01 PROCEDURE — A6258 TRANSPARENT FILM >16<=48 IN: HCPCS

## 2019-11-01 PROCEDURE — G0157 HHC PT ASSISTANT EA 15: HCPCS

## 2019-11-04 ENCOUNTER — HOME CARE VISIT (OUTPATIENT)
Dept: SCHEDULING | Facility: HOME HEALTH | Age: 74
End: 2019-11-04
Payer: MEDICARE

## 2019-11-04 VITALS
HEART RATE: 76 BPM | TEMPERATURE: 97.9 F | DIASTOLIC BLOOD PRESSURE: 80 MMHG | SYSTOLIC BLOOD PRESSURE: 120 MMHG | RESPIRATION RATE: 17 BRPM

## 2019-11-04 PROCEDURE — G0157 HHC PT ASSISTANT EA 15: HCPCS

## 2019-11-07 ENCOUNTER — HOME CARE VISIT (OUTPATIENT)
Dept: SCHEDULING | Facility: HOME HEALTH | Age: 74
End: 2019-11-07
Payer: MEDICARE

## 2019-11-07 VITALS — HEART RATE: 76 BPM | TEMPERATURE: 98.7 F | DIASTOLIC BLOOD PRESSURE: 82 MMHG | SYSTOLIC BLOOD PRESSURE: 134 MMHG

## 2019-11-07 PROCEDURE — G0157 HHC PT ASSISTANT EA 15: HCPCS

## 2019-11-08 PROCEDURE — A4649 SURGICAL SUPPLIES: HCPCS

## 2019-11-12 ENCOUNTER — PATIENT OUTREACH (OUTPATIENT)
Dept: CASE MANAGEMENT | Age: 74
End: 2019-11-12

## 2019-11-12 ENCOUNTER — HOME CARE VISIT (OUTPATIENT)
Dept: SCHEDULING | Facility: HOME HEALTH | Age: 74
End: 2019-11-12
Payer: MEDICARE

## 2019-11-12 ENCOUNTER — HOME CARE VISIT (OUTPATIENT)
Dept: HOME HEALTH SERVICES | Facility: HOME HEALTH | Age: 74
End: 2019-11-12
Payer: MEDICARE

## 2019-11-12 VITALS
TEMPERATURE: 97.9 F | RESPIRATION RATE: 17 BRPM | HEART RATE: 76 BPM | DIASTOLIC BLOOD PRESSURE: 84 MMHG | SYSTOLIC BLOOD PRESSURE: 120 MMHG

## 2019-11-12 PROCEDURE — G0157 HHC PT ASSISTANT EA 15: HCPCS

## 2019-11-12 NOTE — PROGRESS NOTES
This note will not be viewable in [address] Transitions of Care  Follow up Outreach Note   Outreach type Phone call: spoke with patient  Home visit:   Date/Time of Outreach: 11/12/19 1150am     Has patient attended PCP or specialist follow-up appointments since last contact? What was outcome of appointment? When is next follow-up scheduled? Patient states she is progressing well. Follow up with Dr. Roxana Will 11/24/19. Patient will be starting outpatient PT next week. Review medications. Any medication changes since last outreach? Does patient have any questions or issues related to their medications? None stated      Not at this time. Home health active? If yes  any issue? Progress? Yes, [facility] remains active at present       Referrals needed?  (JAMAL, REYES, HH, etc. )   Yes, RE[facility], Patient states she will need transportation to outpatient therapy until she is cleared to drive and requests assistance in obtaining same. Other issues/Miscellaneous? (Transportation, access to meals, ability to perform ADLs, adequate caregiver support, etc.) No other needs or concerns at this time. Patient states her gratitude for follow up. Next Outreach Scheduled? Graduation from program?   N/A    No       Next Steps/Goals (if applicable):    Information sent to REYES Tran [facility]     Outreach completed by:   Burton Otero LPN  Care Coordinator

## 2019-11-14 ENCOUNTER — HOME CARE VISIT (OUTPATIENT)
Dept: SCHEDULING | Facility: HOME HEALTH | Age: 74
End: 2019-11-14
Payer: MEDICARE

## 2019-11-14 ENCOUNTER — HOME CARE VISIT (OUTPATIENT)
Dept: HOME HEALTH SERVICES | Facility: HOME HEALTH | Age: 74
End: 2019-11-14
Payer: MEDICARE

## 2019-11-14 ENCOUNTER — PATIENT OUTREACH (OUTPATIENT)
Dept: CASE MANAGEMENT | Age: 74
End: 2019-11-14

## 2019-11-14 VITALS
SYSTOLIC BLOOD PRESSURE: 132 MMHG | TEMPERATURE: 97.9 F | HEART RATE: 68 BPM | RESPIRATION RATE: 17 BRPM | DIASTOLIC BLOOD PRESSURE: 80 MMHG

## 2019-11-14 PROCEDURE — G0157 HHC PT ASSISTANT EA 15: HCPCS

## 2019-11-14 NOTE — PROGRESS NOTES
REYES BERRY received referral from MARTINA Choi, Care Coordinator. Pt will soon be going to outpatient therapy and needs transportation assistance. REYES BERRY outreached with pt today. She does not have Medicaid, just [employer] Pt has already called her insurance company and there is no transportation benefit with her plan. Given the immediacy of the need, the wait for approval for the medical transportation program through the [employer] would not be a viable option. Pt is contacting family, friends and [employer] members and is reasonably confident that she can work out a transportation schedule until she can be cleared to drive. No CCM episode will be opened at this time. Pt has REYES BERRY's contact # if future needs arise. This note will not be viewable in [address]

## 2019-11-19 ENCOUNTER — HOME CARE VISIT (OUTPATIENT)
Dept: SCHEDULING | Facility: HOME HEALTH | Age: 74
End: 2019-11-19
Payer: MEDICARE

## 2019-11-19 ENCOUNTER — HOME CARE VISIT (OUTPATIENT)
Dept: HOME HEALTH SERVICES | Facility: HOME HEALTH | Age: 74
End: 2019-11-19
Payer: MEDICARE

## 2019-11-19 VITALS
RESPIRATION RATE: 17 BRPM | HEART RATE: 74 BPM | SYSTOLIC BLOOD PRESSURE: 126 MMHG | DIASTOLIC BLOOD PRESSURE: 75 MMHG | TEMPERATURE: 98.5 F

## 2019-11-19 PROCEDURE — G0151 HHCP-SERV OF PT,EA 15 MIN: HCPCS

## 2019-11-21 ENCOUNTER — HOME CARE VISIT (OUTPATIENT)
Dept: SCHEDULING | Facility: HOME HEALTH | Age: 74
End: 2019-11-21
Payer: MEDICARE

## 2019-11-21 VITALS
RESPIRATION RATE: 17 BRPM | HEART RATE: 73 BPM | SYSTOLIC BLOOD PRESSURE: 119 MMHG | TEMPERATURE: 98.5 F | DIASTOLIC BLOOD PRESSURE: 76 MMHG

## 2019-11-21 PROCEDURE — G0151 HHCP-SERV OF PT,EA 15 MIN: HCPCS

## 2020-01-07 ENCOUNTER — HOSPITAL ENCOUNTER (OUTPATIENT)
Dept: LAB | Age: 75
Discharge: HOME | End: 2020-01-07
Payer: MEDICARE

## 2020-01-07 DIAGNOSIS — Z79.899 ON POTASSIUM WASTING DIURETIC THERAPY: Chronic | ICD-10-CM

## 2020-01-07 DIAGNOSIS — E78.2 MIXED HYPERLIPIDEMIA: ICD-10-CM

## 2020-01-07 DIAGNOSIS — G47.33 OBSTRUCTIVE SLEEP APNEA SYNDROME: ICD-10-CM

## 2020-01-07 DIAGNOSIS — K21.00 GASTROESOPHAGEAL REFLUX DISEASE WITH ESOPHAGITIS: ICD-10-CM

## 2020-01-07 DIAGNOSIS — R73.01 FASTING HYPERGLYCEMIA: ICD-10-CM

## 2020-01-07 DIAGNOSIS — I65.23 ATHEROSCLEROSIS OF BOTH CAROTID ARTERIES: Chronic | ICD-10-CM

## 2020-01-07 DIAGNOSIS — I10 ESSENTIAL HYPERTENSION: Chronic | ICD-10-CM

## 2020-01-07 DIAGNOSIS — J45.40 MODERATE PERSISTENT ASTHMA WITHOUT COMPLICATION: Chronic | ICD-10-CM

## 2020-01-07 DIAGNOSIS — Z79.01 ON CONTINUOUS ORAL ANTICOAGULATION: ICD-10-CM

## 2020-01-07 DIAGNOSIS — E03.9 ACQUIRED HYPOTHYROIDISM: Chronic | ICD-10-CM

## 2020-01-07 DIAGNOSIS — E66.9 OBESITY (BMI 30.0-34.9): ICD-10-CM

## 2020-01-07 DIAGNOSIS — Z91.09 ENVIRONMENTAL ALLERGIES: Chronic | ICD-10-CM

## 2020-01-07 DIAGNOSIS — I48.0 PAROXYSMAL ATRIAL FIBRILLATION (HCC): Chronic | ICD-10-CM

## 2020-01-07 LAB
ALBUMIN SERPL-MCNC: 4 G/DL (ref 3.2–4.6)
ALBUMIN/GLOB SERPL: 1.1 {RATIO} (ref 1.2–3.5)
ALP SERPL-CCNC: 117 U/L (ref 50–136)
ALT SERPL-CCNC: 20 U/L (ref 12–65)
ANION GAP SERPL CALC-SCNC: 8 MMOL/L (ref 7–16)
AST SERPL-CCNC: 16 U/L (ref 15–37)
BASOPHILS # BLD: 0 K/UL (ref 0–0.2)
BASOPHILS NFR BLD: 0 % (ref 0–2)
BILIRUB SERPL-MCNC: 0.4 MG/DL (ref 0.2–1.1)
BUN SERPL-MCNC: 22 MG/DL (ref 8–23)
CALCIUM SERPL-MCNC: 9.1 MG/DL (ref 8.3–10.4)
CHLORIDE SERPL-SCNC: 104 MMOL/L (ref 98–107)
CHOLEST SERPL-MCNC: 196 MG/DL
CO2 SERPL-SCNC: 28 MMOL/L (ref 21–32)
CREAT SERPL-MCNC: 0.8 MG/DL (ref 0.6–1)
DIFFERENTIAL METHOD BLD: ABNORMAL
EOSINOPHIL # BLD: 0 K/UL (ref 0–0.8)
EOSINOPHIL NFR BLD: 0 % (ref 0.5–7.8)
ERYTHROCYTE [DISTWIDTH] IN BLOOD BY AUTOMATED COUNT: 15.3 % (ref 11.9–14.6)
GLOBULIN SER CALC-MCNC: 3.7 G/DL (ref 2.3–3.5)
GLUCOSE SERPL-MCNC: 102 MG/DL (ref 65–100)
HCT VFR BLD AUTO: 36.4 % (ref 35.8–46.3)
HDLC SERPL-MCNC: 57 MG/DL (ref 40–60)
HDLC SERPL: 3.4 {RATIO}
HGB BLD-MCNC: 11.6 G/DL (ref 11.7–15.4)
IMM GRANULOCYTES # BLD AUTO: 0 K/UL (ref 0–0.5)
IMM GRANULOCYTES NFR BLD AUTO: 1 % (ref 0–5)
LDLC SERPL CALC-MCNC: 124.2 MG/DL
LIPID PROFILE,FLP: NORMAL
LYMPHOCYTES # BLD: 1.6 K/UL (ref 0.5–4.6)
LYMPHOCYTES NFR BLD: 33 % (ref 13–44)
MAGNESIUM SERPL-MCNC: 2.1 MG/DL (ref 1.8–2.4)
MCH RBC QN AUTO: 25.9 PG (ref 26.1–32.9)
MCHC RBC AUTO-ENTMCNC: 31.9 G/DL (ref 31.4–35)
MCV RBC AUTO: 81.3 FL (ref 79.6–97.8)
MONOCYTES # BLD: 1.4 K/UL (ref 0.1–1.3)
MONOCYTES NFR BLD: 29 % (ref 4–12)
NEUTS SEG # BLD: 1.8 K/UL (ref 1.7–8.2)
NEUTS SEG NFR BLD: 37 % (ref 43–78)
NRBC # BLD: 0 K/UL (ref 0–0.2)
PLATELET # BLD AUTO: 212 K/UL (ref 150–450)
PMV BLD AUTO: 11.1 FL (ref 9.4–12.3)
POTASSIUM SERPL-SCNC: 4.1 MMOL/L (ref 3.5–5.1)
PROT SERPL-MCNC: 7.7 G/DL (ref 6.3–8.2)
RBC # BLD AUTO: 4.48 M/UL (ref 4.05–5.2)
SODIUM SERPL-SCNC: 140 MMOL/L (ref 136–145)
TRIGL SERPL-MCNC: 74 MG/DL (ref 35–150)
TSH SERPL DL<=0.005 MIU/L-ACNC: 1.4 UIU/ML (ref 0.36–3.74)
URATE SERPL-MCNC: 3.4 MG/DL (ref 2.6–6)
VLDLC SERPL CALC-MCNC: 14.8 MG/DL (ref 6–23)
WBC # BLD AUTO: 4.9 K/UL (ref 4.3–11.1)

## 2020-01-07 PROCEDURE — 85025 COMPLETE CBC W/AUTO DIFF WBC: CPT

## 2020-01-07 PROCEDURE — 80053 COMPREHEN METABOLIC PANEL: CPT

## 2020-01-07 PROCEDURE — 80061 LIPID PANEL: CPT

## 2020-01-07 PROCEDURE — 36415 COLL VENOUS BLD VENIPUNCTURE: CPT

## 2020-01-07 PROCEDURE — 84550 ASSAY OF BLOOD/URIC ACID: CPT

## 2020-01-07 PROCEDURE — 83735 ASSAY OF MAGNESIUM: CPT

## 2020-01-07 PROCEDURE — 84443 ASSAY THYROID STIM HORMONE: CPT

## 2020-09-22 PROBLEM — R20.2 PARESTHESIA: Status: ACTIVE | Noted: 2020-09-22

## 2020-09-22 PROBLEM — R53.1 WEAKNESS: Status: ACTIVE | Noted: 2020-09-22

## 2020-09-22 PROBLEM — M54.40 ACUTE LOW BACK PAIN WITH SCIATICA: Status: ACTIVE | Noted: 2020-09-22

## 2020-10-13 ENCOUNTER — HOSPITAL ENCOUNTER (OUTPATIENT)
Dept: MAMMOGRAPHY | Age: 75
Discharge: HOME | End: 2020-10-13
Attending: INTERNAL MEDICINE
Payer: MEDICARE

## 2020-10-13 DIAGNOSIS — Z12.31 OTHER SCREENING MAMMOGRAM: ICD-10-CM

## 2020-10-13 PROCEDURE — 77067 SCR MAMMO BI INCL CAD: CPT

## 2020-10-28 RX ORDER — LIDOCAINE HYDROCHLORIDE 20 MG/ML
15 SOLUTION OROPHARYNGEAL AS NEEDED
Status: CANCELLED | OUTPATIENT
Start: 2020-10-28

## 2020-12-08 RX ORDER — LIDOCAINE HYDROCHLORIDE 20 MG/ML
15 SOLUTION OROPHARYNGEAL AS NEEDED
Status: CANCELLED | OUTPATIENT
Start: 2020-12-08

## 2021-04-23 ENCOUNTER — TRANSCRIBE ORDER (OUTPATIENT)
Dept: SCHEDULING | Age: 76
End: 2021-04-23

## 2021-04-23 DIAGNOSIS — R11.0 NAUSEA: Primary | ICD-10-CM

## 2021-11-03 ENCOUNTER — TRANSCRIBE ORDER (OUTPATIENT)
Dept: SCHEDULING | Age: 76
End: 2021-11-03

## 2021-11-03 DIAGNOSIS — Z12.31 ENCOUNTER FOR SCREENING MAMMOGRAM FOR MALIGNANT NEOPLASM OF BREAST: Primary | ICD-10-CM

## 2022-03-18 PROBLEM — Z91.09 ENVIRONMENTAL ALLERGIES: Status: ACTIVE | Noted: 2017-09-30

## 2022-03-18 PROBLEM — F33.9 RECURRENT DEPRESSION (HCC): Status: ACTIVE | Noted: 2017-12-29

## 2022-03-19 PROBLEM — R20.2 PARESTHESIA: Status: ACTIVE | Noted: 2020-09-22

## 2022-03-19 PROBLEM — R53.1 WEAKNESS: Status: ACTIVE | Noted: 2020-09-22

## 2022-03-19 PROBLEM — Z79.01 ON CONTINUOUS ORAL ANTICOAGULATION: Status: ACTIVE | Noted: 2017-06-19

## 2022-03-19 PROBLEM — M54.40 ACUTE LOW BACK PAIN WITH SCIATICA: Status: ACTIVE | Noted: 2020-09-22

## 2022-03-19 PROBLEM — E66.9 OBESITY (BMI 30.0-34.9): Status: ACTIVE | Noted: 2017-12-09

## 2022-03-19 PROBLEM — J45.40 MODERATE PERSISTENT ASTHMA WITHOUT COMPLICATION: Status: ACTIVE | Noted: 2017-09-30

## 2022-03-19 PROBLEM — K58.2 IRRITABLE BOWEL SYNDROME WITH BOTH CONSTIPATION AND DIARRHEA: Status: ACTIVE | Noted: 2019-01-16

## 2022-03-19 PROBLEM — E55.9 HYPOVITAMINOSIS D: Status: ACTIVE | Noted: 2017-06-19

## 2022-03-19 PROBLEM — Z79.899 ON POTASSIUM WASTING DIURETIC THERAPY: Status: ACTIVE | Noted: 2018-09-19

## 2022-03-19 PROBLEM — H81.10 BPPV (BENIGN PAROXYSMAL POSITIONAL VERTIGO): Status: ACTIVE | Noted: 2017-06-19

## 2022-03-19 PROBLEM — E66.811 OBESITY (BMI 30.0-34.9): Status: ACTIVE | Noted: 2017-12-09

## 2022-03-19 PROBLEM — R73.01 FASTING HYPERGLYCEMIA: Status: ACTIVE | Noted: 2018-09-19

## 2022-03-19 PROBLEM — E78.00 ELEVATED LDL CHOLESTEROL LEVEL: Status: ACTIVE | Noted: 2018-09-19

## 2022-03-20 PROBLEM — M17.12 ARTHRITIS OF KNEE, LEFT: Status: ACTIVE | Noted: 2019-10-22

## 2022-03-20 PROBLEM — Z91.14 NONCOMPLIANCE WITH CPAP TREATMENT: Status: ACTIVE | Noted: 2019-08-01

## 2022-03-20 PROBLEM — Z91.199 NONCOMPLIANCE WITH CPAP TREATMENT: Status: ACTIVE | Noted: 2019-08-01

## 2022-03-20 PROBLEM — Z96.652 TOTAL KNEE REPLACEMENT STATUS, LEFT: Status: ACTIVE | Noted: 2019-10-23

## 2022-04-12 ENCOUNTER — HOSPITAL ENCOUNTER (OUTPATIENT)
Dept: SURGERY | Age: 77
Discharge: HOME | End: 2022-04-12

## 2022-04-12 NOTE — PERIOP NOTES
CARDIAC CLEARANCE      Surgical, Procedural, or Medication Clearance    Physician or Practice Requesting Clearance: Dr. Albert Grossman (anesthesia)  : PAT charge nurse-Kiki Torres  Contact Phone Number: [phone]  Contact Fax Number: [phone]  Date of Surgery/Procedure: 5/3/22  Type of Surgery or Procedure: right TKA  Type of Anesthesia: spinal  Medication to hold: Xarelto   Requested # of days to hold: 72 hours (3 days)    *Pt will be asked to begin a daily 81 mg ASA once Xarelto is held*    Please do not respond via e-mail to this message.

## 2022-04-12 NOTE — PERIOP NOTES
Patient did not show for appointment. Note seen in EHR that states patient has rescheduled her surgery for 8/2/22 due to health issues.

## 2022-05-03 ENCOUNTER — HOSPITAL ENCOUNTER (OUTPATIENT)
Dept: MAMMOGRAPHY | Age: 77
Discharge: HOME | End: 2022-05-03
Attending: INTERNAL MEDICINE
Payer: MEDICARE

## 2022-05-03 DIAGNOSIS — Z12.31 ENCOUNTER FOR SCREENING MAMMOGRAM FOR MALIGNANT NEOPLASM OF BREAST: ICD-10-CM

## 2022-05-03 PROCEDURE — 77067 SCR MAMMO BI INCL CAD: CPT

## 2022-06-23 DIAGNOSIS — M17.11 PRIMARY OSTEOARTHRITIS OF RIGHT KNEE: Primary | ICD-10-CM

## 2022-07-11 ENCOUNTER — PREP FOR PROCEDURE (OUTPATIENT)
Dept: ORTHOPEDIC SURGERY | Age: 77
End: 2022-07-11

## 2022-07-11 DIAGNOSIS — M17.11 PRIMARY OSTEOARTHRITIS OF RIGHT KNEE: Primary | ICD-10-CM

## 2022-07-11 RX ORDER — ACETAMINOPHEN 325 MG/1
1000 TABLET ORAL ONCE
Status: CANCELLED | OUTPATIENT
Start: 2022-07-11 | End: 2022-07-11

## 2022-07-12 ENCOUNTER — TELEPHONE (OUTPATIENT)
Dept: ORTHOPEDIC SURGERY | Age: 77
End: 2022-07-12

## 2022-07-12 NOTE — TELEPHONE ENCOUNTER
Her  Son  Talia Flood and  They need to  Cancel  Her  August  Surgery  Until  Cardio  Clears  Her better.   She  Is  Having  Cardio issues  They  Will call back to  R/s    For  Fall

## 2022-10-19 ENCOUNTER — TELEPHONE (OUTPATIENT)
Dept: ORTHOPEDIC SURGERY | Age: 77
End: 2022-10-19

## 2022-10-19 DIAGNOSIS — M17.11 PRIMARY OSTEOARTHRITIS OF RIGHT KNEE: Primary | ICD-10-CM

## 2022-11-09 ENCOUNTER — OFFICE VISIT (OUTPATIENT)
Dept: ORTHOPEDIC SURGERY | Age: 77
End: 2022-11-09
Payer: MEDICARE

## 2022-11-09 DIAGNOSIS — M17.11 PRIMARY OSTEOARTHRITIS OF RIGHT KNEE: Primary | ICD-10-CM

## 2022-11-09 PROCEDURE — 20611 DRAIN/INJ JOINT/BURSA W/US: CPT | Performed by: PHYSICIAN ASSISTANT

## 2022-11-09 NOTE — PROGRESS NOTES
Name: Judi Platt  YOB: 1945  Gender: female  MRN: [id number]     CC: RIGHT Knee Osteoarthritis      PROCEDURE: #1 of 3 Hyaluronic Injection    After discussion of risks and benefits including but not limited to pain, infection, skin discoloration, and injury to blood vessels or nerves the patient verbally consented to proceed with injection of the RIGHT. The patient is to restrict their activity for 48 hours. Radiology Report: US guidance was used to examine the joint, ensure adequate needle placement and to decrease the risk of joint aggravation. The intracondylar notch, retropatellar fat pad, patella tendon, patella and tibia were all visualized. Pre and post injection US still images were obtained and placed in the record. Image were obtained with a [email or web address] ultrasound transducer (model [id number]). Procedure Note: After steriley prepping the RIGHT knee, it was injected with a 2mL of Synvisc and the medication was observed going into the intra-articular space via US guidance. The patient tolerated the procedure without difficulty.     Marylen Sanfilippo, PA

## 2022-11-16 ENCOUNTER — OFFICE VISIT (OUTPATIENT)
Dept: ORTHOPEDIC SURGERY | Age: 77
End: 2022-11-16
Payer: MEDICARE

## 2022-11-16 DIAGNOSIS — M17.11 PRIMARY OSTEOARTHRITIS OF RIGHT KNEE: Primary | ICD-10-CM

## 2022-11-16 PROCEDURE — 20611 DRAIN/INJ JOINT/BURSA W/US: CPT | Performed by: PHYSICIAN ASSISTANT

## 2022-11-16 NOTE — PROGRESS NOTES
Name: Lizbeth Ashford  YOB: 1945  Gender: female  MRN: [id number]     CC: RIGHT Knee Osteoarthritis      PROCEDURE: #2 of 3 Hyaluronic Injection    After discussion of risks and benefits including but not limited to pain, infection, skin discoloration, and injury to blood vessels or nerves the patient verbally consented to proceed with injection of the RIGHT. The patient is to restrict their activity for 48 hours. Radiology Report: US guidance was used to examine the joint, ensure adequate needle placement and to decrease the risk of joint aggravation. The intracondylar notch, retropatellar fat pad, patella tendon, patella and tibia were all visualized. Pre and post injection US still images were obtained and placed in the record. Image were obtained with a [employer] ultrasound transducer (model [id number]). Procedure Note: After steriley prepping the RIGHT knee, it was injected with a 2mL of Synvisc and the medication was observed going into the intra-articular space via US guidance. The patient tolerated the procedure without difficulty.     KENDELL Vieira

## 2022-11-30 ENCOUNTER — OFFICE VISIT (OUTPATIENT)
Dept: ORTHOPEDIC SURGERY | Age: 77
End: 2022-11-30

## 2022-11-30 DIAGNOSIS — M17.11 PRIMARY OSTEOARTHRITIS OF RIGHT KNEE: Primary | ICD-10-CM

## 2022-11-30 NOTE — PROGRESS NOTES
Name: Sofie Wang  YOB: 1945  Gender: female  MRN: [id number]     CC: RIGHT Knee Osteoarthritis      PROCEDURE: #3 of 3 Hyaluronic Injection    After discussion of risks and benefits including but not limited to pain, infection, skin discoloration, and injury to blood vessels or nerves the patient verbally consented to proceed with injection of the RIGHT. The patient is to restrict their activity for 48 hours. Radiology Report: US guidance was used to examine the joint, ensure adequate needle placement and to decrease the risk of joint aggravation. The intracondylar notch, retropatellar fat pad, patella tendon, patella and tibia were all visualized. Pre and post injection US still images were obtained and placed in the record. Image were obtained with a [employer] ultrasound transducer (model [id number]). Procedure Note: After steriley prepping the RIGHT knee, it was injected with a 2mL of Synvisc and the medication was observed going into the intra-articular space via US guidance. The patient tolerated the procedure without difficulty.     KENDELL Johnson

## 2023-01-13 ENCOUNTER — OFFICE VISIT (OUTPATIENT)
Dept: NEUROLOGY | Age: 78
End: 2023-01-13
Payer: MEDICARE

## 2023-01-13 DIAGNOSIS — I63.89 CEREBROVASCULAR ACCIDENT (CVA) DUE TO OTHER MECHANISM (HCC): ICD-10-CM

## 2023-01-13 DIAGNOSIS — G62.9 NEUROPATHY: Primary | ICD-10-CM

## 2023-01-13 DIAGNOSIS — G20 PARKINSONISM, UNSPECIFIED PARKINSONISM TYPE (HCC): ICD-10-CM

## 2023-01-13 DIAGNOSIS — M50.30 DDD (DEGENERATIVE DISC DISEASE), CERVICAL: ICD-10-CM

## 2023-01-13 DIAGNOSIS — M51.36 DDD (DEGENERATIVE DISC DISEASE), LUMBAR: ICD-10-CM

## 2023-01-13 PROCEDURE — 1123F ACP DISCUSS/DSCN MKR DOCD: CPT | Performed by: PSYCHIATRY & NEUROLOGY

## 2023-01-13 PROCEDURE — 99214 OFFICE O/P EST MOD 30 MIN: CPT | Performed by: PSYCHIATRY & NEUROLOGY

## 2023-01-13 ASSESSMENT — ENCOUNTER SYMPTOMS
RESPIRATORY NEGATIVE: 1
EYES NEGATIVE: 1
GASTROINTESTINAL NEGATIVE: 1

## 2023-01-13 NOTE — PROGRESS NOTES
[address], [address], [address]  Phone: [phone] Fax [phone]  Dr. Angelica Benavides      1/13/2023  Davi Amaya     Patient is referred by the following provider for consultation regarding as below:       I reviewed the available records and notes and have examined patient with the following findings:     Chief Complaint:  No chief complaint on file. HPI: This is a right handed 68 y.o.  female last Tibella evaluated by me was in May 2021. At that time the patient had significant balance problems and severe ambulation problems she was very concerned about Parkinson's because her sister had Parkinson's disease her brother and mother had Alzheimer's disease and but she is not displaying memory issues. She is extremely tearful because she is led to believe that she should be doing much more than she could. She could not ambulate up here to the room or the office. She had to [facility] and then use a wheelchair just to get up to the office. Her gait is much worse shuffling gait block turning she has an action tremor that ablates with rest that appears to be more benign essential tremor related. But she admits to micrographia. She is still concerned about Parkinson's. But because she is declining with both ambulation and balance dizziness which is vertigo-like symptoms she went to the ENT doctors over [facility] who wanted an MRI done so they ordered it but unfortunately she came down with COVID so she went to ENT in November rapidly she had COVID but had to cancel that MRI. She has never tried carbidopa levodopa. She is very dynamic she is not hypophonic. She was in tears because she had expressed that her family feels like she should be allowed doing a lot more than what she can do. IMAGING REVIEW:  I REVIEWED PERTINENT  IMAGES AND REPORTS WITH THE PATIENT PERSONALLY, DIRECTLY AND FULLY.      Past Medical History:  Past Medical History: Diagnosis Date    Acquired stenosis of external ear canal unspecified as to cause 8/16/2011    Anemia 10/13/2010    Anticoagulant long-term use 06/08/2016    for AF, xarelto    Anxiety and depression 6/23/2009    Arthritis     osteo    Asthma     Symbicort daily and pro-air as needed, No hospitalizations     Atrial fibrillation ([address] 2012    AF despite flecainide and then amiodarone. AF ablation 2017    Atrophic vaginitis 5/2/2012    B12 deficiency 11/17/2010    Blood glucose elevated 2/3/2009    Chest pain     11/2008 stress nuclear: normal: 12/2011 stress nuclear: normal; 12/2014 cardiac CT: no significant calcium or obstructive CAD. Pt denies CP currrently. Chronic pain     Dysfunctional uterine bleeding 5/2/2012    Generalized anxiety disorder 10/21/2016    GERD (gastroesophageal reflux disease) 11/08/2012    managed with meds     High risk medication use 10/21/2016    HLD (hyperlipidemia) 2/3/2009    HTN (hypertension)     controlled w/med    Hypothyroidism 10/12/2009    stable w/med daily    IBS (irritable bowel syndrome) 7/5/2011    Insomnia 3/17/2010    Leaky heart valve     Found on a Echo     Leukocytosis 1/15/2014    LVH (left ventricular hypertrophy) 1/13/2009    Microscopic hematuria 4/16/2014    Obesity     Osteopenia 1/15/2014    Pernicious anemia 1/15/2014    Pneumonia 12/30/2009    Positive occult stool blood test 10/13/2010    Skin lesion 8/16/2011    Thrombocytopenia ([address] 2/28/2013    Unspecified sleep apnea     Does not wear a c-pap     Urinary frequency 3/28/2013    Vertigo 12/9/2010       Past Surgical History:  Past Surgical History:   Procedure Laterality Date    COLONOSCOPY  2010    polyps    FL CARDIAC SURG PROCEDURE UNLIST      ablation    TOTAL KNEE ARTHROPLASTY Left 10/2020    US GUIDED CORE BREAST BIOPSY Right 1966       Social History:  Social History     Socioeconomic History    Marital status:       Spouse name: Not on file    Number of children: Not on file    Years of education: Not on file    Highest education level: Not on file   Occupational History    Not on file   Tobacco Use    Smoking status: Never    Smokeless tobacco: Never   Substance and Sexual Activity    Alcohol use: No    Drug use: No    Sexual activity: Not on file   Other Topics Concern    Not on file   Social History Narrative    Not on file     Social Determinants of Health     Financial Resource Strain: Not on file   Food Insecurity: Not on file   Transportation Needs: Not on file   Physical Activity: Not on file   Stress: Not on file   Social Connections: Not on file   Intimate Partner Violence: Not on file   Housing Stability: Not on file       Family History:   Family History   Problem Relation Age of Onset    Diabetes Brother     Asthma Father     Breast Cancer Neg Hx     Hypertension Mother     Diabetes Mother        Current Outpatient Medications on File Prior to Visit   Medication Sig Dispense Refill    albuterol sulfate  (90 Base) MCG/ACT inhaler Inhale 1 puff into the lungs every 4 hours as needed      clobetasol (TEMOVATE) 0.05 % cream Apply topically      cyanocobalamin 1000 MCG tablet Take 1,000 mcg by mouth daily      diclofenac sodium (VOLTAREN) 1 % GEL Apply 4g to the affected area 4 times a day as needed.       dilTIAZem (TIAZAC) 360 MG extended release capsule Take 360 mg by mouth daily      estradiol (ESTRACE) 0.1 MG/GM vaginal cream Apply externally every night for two weeks then twice weekly      flecainide (TAMBOCOR) 50 MG tablet Take 50 mg by mouth 2 times daily      fluticasone (ARNUITY ELLIPTA) 100 MCG/ACT AEPB TAKE 1 PUFF BY MOUTH EVERY DAY      furosemide (LASIX) 20 MG tablet Furosemide 20 Mg, one PO daily      levothyroxine (SYNTHROID) 112 MCG tablet Take 112 mcg by mouth every morning (before breakfast)      lisinopril (PRINIVIL;ZESTRIL) 20 MG tablet Take 20 mg by mouth daily      magnesium citrate (CITROMA) SOLN Take 148 mLs by mouth      montelukast (SINGULAIR) 10 MG tablet Take 10 mg by mouth daily      omeprazole (PRILOSEC) 20 MG delayed release capsule Take 20 mg by mouth 2 times daily      potassium chloride (KLOR-CON M) 20 MEQ extended release tablet Take 20 mEq by mouth daily      rivaroxaban (XARELTO) 20 MG TABS tablet Take 20 mg by mouth daily      sodium chloride (OCEAN) 0.65 % nasal spray 2 sprays by Nasal route 4 times daily      vitamin E 100 units capsule Take by mouth daily      zolpidem (AMBIEN) 5 MG tablet Take 5 mg by mouth. No current facility-administered medications on file prior to visit. Allergies   Allergen Reactions    Betamethasone Anxiety     Jittery  Jittery    Hydrochlorothiazide Other (See Comments)     Interaction with Tikosyn    Sertraline Shortness Of Breath    Albuterol Other (See Comments)     tachycardia/palpitations    Cetirizine-Pseudoephedrine Er Other (See Comments)     Interaction with Tikosyn    Ciprofloxacin Other (See Comments)     Interaction with Tikosyn    Cortisone Other (See Comments)     Extreme redness to face    Desvenlafaxine Other (See Comments)     Affected her mental abilities    Diphenhydramine Other (See Comments)     'I get red in the face\"    Duloxetine Other (See Comments)     FOGGY    Escitalopram Nausea Only    Fexofenadine-Pseudoephed Er Other (See Comments)    Ramelteon Other (See Comments)     SPACEY    Sulfa Antibiotics Other (See Comments)     Interaction with Tikosyn    Venlafaxine Nausea Only       Review of Systems:  Review of Systems   Constitutional: Negative. HENT: Negative. Eyes: Negative. Respiratory: Negative. Cardiovascular: Negative. Gastrointestinal: Negative. Endocrine: Negative. Genitourinary: Negative. Musculoskeletal: Negative. Skin: Negative. Neurological:  Positive for tremors, weakness and numbness. Psychiatric/Behavioral: Negative. No flowsheet data found. No flowsheet data found. There were no vitals filed for this visit.      Physical Exam  Constitutional:       Appearance: Normal appearance. HENT:      Head: Normocephalic and atraumatic. Eyes:      Extraocular Movements: Extraocular movements intact and EOM normal.      Pupils: Pupils are equal, round, and reactive to light. Cardiovascular:      Rate and Rhythm: Normal rate. Pulses: Normal pulses. Pulmonary:      Effort: Pulmonary effort is normal.   Abdominal:      Palpations: Abdomen is soft. Neurological:      Mental Status: She is alert and oriented to person, place, and time. Deep Tendon Reflexes:      Reflex Scores:       Tricep reflexes are 1+ on the right side and 1+ on the left side. Bicep reflexes are 1+ on the right side and 1+ on the left side. Brachioradialis reflexes are 1+ on the right side and 1+ on the left side. Patellar reflexes are 1+ on the right side and 1+ on the left side. Achilles reflexes are 0 on the right side and 0 on the left side. Neurologic Exam     Mental Status   Oriented to person, place, and time. Level of consciousness: alert  Knowledge: good. She is dynamic and outgoing there is no loss of blink reflex. There does not appear to be any flat affect. Cranial Nerves     CN II   Visual fields full to confrontation. CN III, IV, VI   Pupils are equal, round, and reactive to light. Extraocular motions are normal.     CN VII   Facial expression full, symmetric.      Motor Exam   Right arm tone: cogwheel rigidity  Left arm tone: cogwheel rigidityWeakness of bilateral upper as well as lower extremities     Sensory Exam   Right leg vibration: decreased from ankle  Left leg vibration: decreased from ankle  Right leg pinprick: decreased from ankle  Left leg pinprick: decreased from ankle    Gait, Coordination, and Reflexes     Reflexes   Right brachioradialis: 1+  Left brachioradialis: 1+  Right biceps: 1+  Left biceps: 1+  Right triceps: 1+  Left triceps: 1+  Right patellar: 1+  Left patellar: 1+  Right achilles: 0  Left achilles: 0Though she is in a wheelchair from downstairs she still can ambulate a few steps she does shuffle her gait and block turn. Again it does not appear to be a parkinsonian gait but it is close to it. Assessment   Assessment / Plan:    Diagnoses and all orders for this visit:    Neuropathy I last evaluated this patient is in 2021 and I still believe that her problems may not be parkinsonism and that they are multifactorial associated with a peripheral neuropathy seen on EMG and nerve study, moderate cervical degenerative disc disease and central stenosis with severe degenerative disc disease at multilevels left knee that was replaced and now needing a right knee replacement. It the concern is that she did have a sister with Parkinson's disease and so she has been concerned about this every time of ever seen her. Organ to move forward because she has a little bit of rigidity she definitely bloc turns shuffled gait but again this could all be from the other factors organ to move forward with Sinemet 25 100 1 in the morning 1 midday and see if she improves as well as set up with physical therapy with the Parkinson specialist.  Her ENT doctors wanted to move forward with an MRI of the brain they ordered it but she came down with COVID so she had to cancel it working to reorder it for her. DDD (degenerative disc disease), cervical    DDD (degenerative disc disease), lumbar        The Diagnosis and differential diagnostic considerations, and Rx Tx were reviewed with the patient at length. Orders Placed This Encounter   Procedures    MRI BRAIN W WO CONTRAST     Standing Status:   Future     Standing Expiration Date:   1/13/2024     Order Specific Question:   STAT Creatinine as needed:     Answer:    Yes    Ambulatory referral to Physical Therapy     Referral Priority:   Routine     Referral Type:   Eval and Treat     Referral Reason:   Specialty Services Required Referred to Provider:   Kerrie Willis PT     Requested Specialty:   Physical Therapist     Number of Visits Requested:   1          I have spent greater than 50% of visit discussing and counseling of patient  for treatment and diagnostic plan review. Total time30 min     . Notes: Patient is to continue all medications as directed by prescribing physicians. Continuations on today's visit are made based on the patient's report of current medications.              Dr. Angelica Benavides  Consultation Neurology, Neurodiagnostics and Neurotherapeutics  Neuroelectrophysiology, EEG, EMG  [facility]  [address] [place] [address], [address] [place] [address]  Phone:  [phone]  Fax:   [phone]

## 2023-02-07 ENCOUNTER — OFFICE VISIT (OUTPATIENT)
Dept: ORTHOPEDIC SURGERY | Age: 78
End: 2023-02-07
Payer: MEDICARE

## 2023-02-07 DIAGNOSIS — M41.9 SCOLIOSIS OF LUMBOSACRAL SPINE, UNSPECIFIED SCOLIOSIS TYPE: ICD-10-CM

## 2023-02-07 DIAGNOSIS — M54.16 LUMBAR RADICULOPATHY: ICD-10-CM

## 2023-02-07 DIAGNOSIS — M51.36 LUMBAR DEGENERATIVE DISC DISEASE: ICD-10-CM

## 2023-02-07 DIAGNOSIS — M40.205 KYPHOSIS OF THORACOLUMBAR REGION, UNSPECIFIED KYPHOSIS TYPE: ICD-10-CM

## 2023-02-07 DIAGNOSIS — M43.16 SPONDYLOLISTHESIS OF LUMBAR REGION: ICD-10-CM

## 2023-02-07 DIAGNOSIS — G62.9 NEUROPATHY: ICD-10-CM

## 2023-02-07 DIAGNOSIS — M54.50 LOW BACK PAIN, UNSPECIFIED BACK PAIN LATERALITY, UNSPECIFIED CHRONICITY, UNSPECIFIED WHETHER SCIATICA PRESENT: Primary | ICD-10-CM

## 2023-02-07 PROCEDURE — 1123F ACP DISCUSS/DSCN MKR DOCD: CPT | Performed by: NURSE PRACTITIONER

## 2023-02-07 PROCEDURE — 99203 OFFICE O/P NEW LOW 30 MIN: CPT | Performed by: NURSE PRACTITIONER

## 2023-02-07 RX ORDER — BUSPIRONE HYDROCHLORIDE 5 MG/1
TABLET ORAL
COMMUNITY
Start: 2023-02-04

## 2023-02-07 NOTE — PROGRESS NOTES
Name: Abram Gonzales  YOB: 1945  Gender: female  MRN: [id number]    CC: Bilateral foot numbness, chronic back pain    HPI: This is a 68y.o. year old female who has multiple issues that are occurring. She has significant peripheral neuropathy. She is under the care of neurology. They are also working up for potential Parkinson's. She does have a sister with Parkinson's and she feels she has some of similar symptoms. She has difficulty with her gait and weakness due to not being able to feel her feet. She also has chronic back pain. Can radiate into the right buttock and down the back of the leg to the knee. Pain has been worse over the past 3 to 4 weeks. She has a history of central stenosis at the C4 -5 level. Previous MRI of the lumbar spine in 2020 revealed that arthropathy and multiple areas of foraminal stenosis. Patient has a known scoliosis. She has had a left total knee arthroplasty and needs a right total knee arthroplasty. She is on Xarelto. She does have a significant medication list.  She has difficulty doing daily activities now due to her chronic back pain. She is also having worsening weakness in her legs. The patient denies any change in bowel or bladder function since the onset of the symptoms. she  has not had lumbar surgery in the past.      Thus far, the patient has tried tylenol, physician directed home exercise program, and physical therapy 3 months    Current pain level: Activities limited by pain:        AMB PAIN ASSESSMENT 2/7/2023   Location of Pain Back   Location Modifiers Right;Left   Severity of Pain 8   Frequency of Pain Intermittent   Limiting Behavior Yes   Result of Injury No   Work-Related Injury No   Are there other pain locations you wish to document? No            ROS/Meds/PSH/PMH/FH/SH: I personally reviewed the patient's collected intake data.   Below are the pertinents:    Allergies   Allergen Reactions    Betamethasone Anxiety Jittery  Jittery    Hydrochlorothiazide Other (See Comments)     Interaction with Tikosyn    Sertraline Shortness Of Breath    Albuterol Other (See Comments)     tachycardia/palpitations    Cetirizine-Pseudoephedrine Er Other (See Comments)     Interaction with Tikosyn    Ciprofloxacin Other (See Comments)     Interaction with Tikosyn    Cortisone Other (See Comments)     Extreme redness to face    Desvenlafaxine Other (See Comments)     Affected her mental abilities    Diphenhydramine Other (See Comments)     'I get red in the face\"    Duloxetine Other (See Comments)     FOGGY    Escitalopram Nausea Only    Fexofenadine-Pseudoephed Er Other (See Comments)    Ramelteon Other (See Comments)     SPACEY    Sulfa Antibiotics Other (See Comments)     Interaction with Tikosyn    Venlafaxine Nausea Only         Current Outpatient Medications:     carbidopa-levodopa (SINEMET)  MG per tablet, Take one in AM and noon, Disp: 60 tablet, Rfl: 5    albuterol sulfate  (90 Base) MCG/ACT inhaler, Inhale 1 puff into the lungs every 4 hours as needed, Disp: , Rfl:     clobetasol (TEMOVATE) 0.05 % cream, Apply topically, Disp: , Rfl:     cyanocobalamin 1000 MCG tablet, Take 1,000 mcg by mouth daily, Disp: , Rfl:     diclofenac sodium (VOLTAREN) 1 % GEL, Apply 4g to the affected area 4 times a day as needed. , Disp: , Rfl:     dilTIAZem (TIAZAC) 360 MG extended release capsule, Take 360 mg by mouth daily, Disp: , Rfl:     estradiol (ESTRACE) 0.1 MG/GM vaginal cream, Apply externally every night for two weeks then twice weekly, Disp: , Rfl:     flecainide (TAMBOCOR) 50 MG tablet, Take 50 mg by mouth 2 times daily, Disp: , Rfl:     fluticasone (ARNUITY ELLIPTA) 100 MCG/ACT AEPB, TAKE 1 PUFF BY MOUTH EVERY DAY, Disp: , Rfl:     furosemide (LASIX) 20 MG tablet, Furosemide 20 Mg, one PO daily, Disp: , Rfl:     levothyroxine (SYNTHROID) 112 MCG tablet, Take 112 mcg by mouth every morning (before breakfast), Disp: , Rfl: lisinopril (PRINIVIL;ZESTRIL) 20 MG tablet, Take 20 mg by mouth daily, Disp: , Rfl:     omeprazole (PRILOSEC) 20 MG delayed release capsule, Take 20 mg by mouth 2 times daily, Disp: , Rfl:     potassium chloride (KLOR-CON M) 20 MEQ extended release tablet, Take 20 mEq by mouth daily, Disp: , Rfl:     rivaroxaban (XARELTO) 20 MG TABS tablet, Take 20 mg by mouth daily, Disp: , Rfl:     sodium chloride (OCEAN) 0.65 % nasal spray, 2 sprays by Nasal route 4 times daily, Disp: , Rfl:     vitamin E 100 units capsule, Take by mouth daily, Disp: , Rfl:     zolpidem (AMBIEN) 5 MG tablet, Take 5 mg by mouth., Disp: , Rfl:     busPIRone (BUSPAR) 5 MG tablet, , Disp: , Rfl:     magnesium citrate (CITROMA) SOLN, Take 148 mLs by mouth (Patient not taking: Reported on 2/7/2023), Disp: , Rfl:     montelukast (SINGULAIR) 10 MG tablet, Take 10 mg by mouth daily, Disp: , Rfl:     Past Surgical History:   Procedure Laterality Date    COLONOSCOPY  2010    polyps    MA CARDIAC SURG PROCEDURE UNLIST      ablation    TOTAL KNEE ARTHROPLASTY Left 10/2020    US GUIDED CORE BREAST BIOPSY Right 1966       Patient Active Problem List   Diagnosis    GERD (gastroesophageal reflux disease)    HLD (hyperlipidemia)    Asthma    Environmental allergies    Recurrent depression (HCC)    Palpitations    Hypovitaminosis D    Obesity (BMI 30.0-34. 9)    Acute low back pain with sciatica    Weakness    Syncope and collapse    Bifascicular block    Atrial fibrillation (HCC)    Persistent disorder of initiating or maintaining sleep    BPPV (benign paroxysmal positional vertigo)    On continuous oral anticoagulation    On potassium wasting diuretic therapy    Elevated LDL cholesterol level    Fasting hyperglycemia    MR (mitral regurgitation)    B12 deficiency    TR (tricuspid regurgitation)    Snoring    Essential hypertension    Generalized anxiety disorder    Microhematuria    Chest pain, unspecified    Hypothyroidism    High risk medication use    LVH (left ventricular hypertrophy)    Paresthesia    Irritable bowel syndrome with both constipation and diarrhea    Moderate persistent asthma without complication    Depression    Total knee replacement status, left    Obstructive sleep apnea syndrome    Osteopenia    Anticoagulant long-term use    Arthritis of knee, left    Noncompliance with CPAP treatment    Osteoarthritis of right knee     Tobacco:  reports that she has never smoked. She has never used smokeless tobacco.  Alcohol:   Social History     Substance and Sexual Activity   Alcohol Use No          Physical Exam:   BMI: There is no height or weight on file to calculate BMI. GENERAL:  Adult in no acute distress, well developed, well nourished Patient is appropriately conversant  MSK:  Examination of the lumbar spine reveals thoracic kyphosis, scoliosis , paraspinal tenderness , facet tenderness   There is severe tenderness to palpation along the spinous processes and paraspinal musculature. The patient ambulates with a unsteady, forward flexed, and shuffling gait. ROM of bilateral hip(s) reveals no irritability. NEURO:  Cranial nerves grossly intact. No motor deficits. Straight leg testing is positive bilateral  Sensory testing reveals intact sensation to light touch and in the distribution of the L3-S1 dermatomes bilaterally  Ankle jerk is negative for clonus    Reflexes   Right Left   Quadriceps (L4) 2 2   Achilles (S1) 2 2     Strength testing in the lower extremity reveals the following based on the 5 point grading scale:     HF (L2) H Ab (L5) KE (L3/4) ADF (L4) EHL (L5) A Ev (S1) APF (S1)   Right 5 5 5 5 5 5 5   Left 5 5 5 5 5 5 5     PSYCH:  Alert and oriented X 3. Appropriate affect. Intact judgment and insight. Radiographic Studies:     AP, lateral and spot views of the lumbar spine:      Patient has significant lower lumbar degeneration. There is a lumbar scoliosis that is significant. She has a thoracolumbar kyphosis as well. There is severe disc degeneration. There is almost fusing of the vertebral bodies. Interpretation: lumbar scoliosis, kyphosis and advanced multilevel disc degeneration      MRI of Lumbar spine images independently reviewed from 2020:   09/14/2020   Formatting of this note might be different from the original.     EXAM:  MRI Lumbar spine     COMPARISON:  None. INDICATION:  M48.02 Spinal stenosis, cervical region I10;M48.062 Spinal stenosis, lumbar region with neurogenic claudication I10;     TECHNICAL: Sagittal T1, sagittal T2, sagittal STIR, axial T1, and axial T2 sequences of the lumbar spine performed. FINDINGS:   Osseous structures: There is leftward convex lumbar scoliosis. There are presumed to be 5 nonrib-bearing lumbar vertebral bodies. Vertebral body height and marrow signal is normal aside from type I Modic endplate changes present from T12 through L4. Alignment is normal.   Conus tip terminates at L1. No retroperitoneal mass nor lymphadenopathy present. There is a partially visualized disc bulge with moderate canal stenosis at T11-12. Additionally, there is a disc bulge at T12-L1 with mild canal stenosis. L1-2:  Disc bulge with mild canal stenosis. There is mild left foraminal stenosis and no significant right foraminal stenosis. L2-3:  Diffuse disc bulge with right greater than left facet arthropathy. There is mild narrowing of the right lateral recess. Mild to moderate right and no significant left neural foraminal stenosis is present. L3-4:  Diffuse disc bulge with right greater than left facet arthropathy. There is mild narrowing of the right lateral recess. There is moderate to severe right and no significant left neural foraminal stenosis. L4-5:  Disc protrusion centered in the left foraminal region with severe left foraminal stenosis. There is narrowing of the left lateral recess.  Mild right foraminal stenosis is noted with mild to moderate left neural foraminal stenosis. L5-S1:  Mild diffuse disc bulge with severe bilateral facet arthropathy. No significant canal stenosis. There is severe left and minimal right foraminal stenosis. IMPRESSION:   1.  Multilevel degenerative changes with leftward convex lumbar scoliosis as described above. 2. Partially visualized lower thoracic disc bulges and canal stenosis. Signed by: 9/14/2020 5:22 PM: Garry Sportsman      Assessment/Plan:       Diagnosis Orders   1. Low back pain, unspecified back pain laterality, unspecified chronicity, unspecified whether sciatica present  XR LUMBAR SPINE (2-3 VIEWS)      2. Lumbar degenerative disc disease  MRI LUMBAR SPINE WO CONTRAST      3. Spondylolisthesis of lumbar region  MRI LUMBAR SPINE WO CONTRAST      4. Scoliosis of lumbosacral spine, unspecified scoliosis type  MRI LUMBAR SPINE WO CONTRAST      5. Kyphosis of thoracolumbar region, unspecified kyphosis type  MRI LUMBAR SPINE WO CONTRAST      6. Lumbar radiculopathy  MRI LUMBAR SPINE WO CONTRAST      7. Neuropathy  MRI LUMBAR SPINE WO CONTRAST          This patient's clinical history and physical exam is consistent with a right  S-1 lumbar radiculopathy. But her primary complaint is back pain. She has numerous reasons to have significant back pain and debility. We discussed that unfortunately her gait is very influenced by her neuropathy. My concern is to make sure she does not have any stenosis that is contributing to this as well. She does not show any signs of cervical myelopathy at the current time. She really does not need any more medications added to her list and would prefer to do interventional management which I completely agree with. I think she would be a great candidate for interventional management. I will go and obtain an MRI of the lumbar spine. She will follow-up after MRI.   We briefly discussed gabapentin for her neuropathy relief in pain management however she is just getting ready start Sinemet trial to rule out Parkinson's and I do not want to cause any issue with this. So we will hold off. We discussed the natural history of lumbar radiculopathy in that many of these patients have near complete resolution of their symptoms within eight to twelve weeks with conservative care. We discussed that conservative treatments typically start with activity modification, and medication followed by physical therapy as symptoms allow. Oral and/or epidural steroids are other options. I also discussed potential surgical options if the symptoms fail to improve or there is a progressive neurologic deficit and conservative management has been exhausted. We discussed that surgery is not typically a reliable treatment for isolated back pain, but is usually very reliable in relieving buttock and leg symptoms.        - A MRI was ordered to delineate anatomy, confirm the diagnosis and assess the severity. 3 This is stable chronic illness/condition    No orders of the defined types were placed in this encounter. Orders Placed This Encounter   Procedures    XR LUMBAR SPINE (2-3 VIEWS)    MRI LUMBAR SPINE WO CONTRAST            Return for MRI results. EVELYNE Palmer - CNP  02/07/23      Elements of this note were created using speech recognition software. As such, errors of speech recognition may be present.

## 2023-06-09 ENCOUNTER — TRANSCRIBE ORDERS (OUTPATIENT)
Dept: SCHEDULING | Age: 78
End: 2023-06-09

## 2023-06-09 DIAGNOSIS — Z12.31 SCREENING MAMMOGRAM FOR HIGH-RISK PATIENT: Primary | ICD-10-CM

## 2023-09-12 ENCOUNTER — TELEPHONE (OUTPATIENT)
Dept: ORTHOPEDIC SURGERY | Age: 78
End: 2023-09-12

## 2023-09-14 ENCOUNTER — TELEPHONE (OUTPATIENT)
Dept: ORTHOPEDIC SURGERY | Age: 78
End: 2023-09-14

## 2023-09-15 ENCOUNTER — HOME HEALTH ADMISSION (OUTPATIENT)
Dept: HOME HEALTH SERVICES | Facility: HOME HEALTH | Age: 78
End: 2023-09-15
Payer: MEDICARE

## 2023-09-15 ENCOUNTER — OFFICE VISIT (OUTPATIENT)
Dept: ORTHOPEDIC SURGERY | Age: 78
End: 2023-09-15

## 2023-09-15 DIAGNOSIS — Z96.652 TOTAL KNEE REPLACEMENT STATUS, LEFT: Primary | ICD-10-CM

## 2023-09-15 DIAGNOSIS — M17.11 PRIMARY OSTEOARTHRITIS OF RIGHT KNEE: ICD-10-CM

## 2023-09-15 DIAGNOSIS — M70.61 GREATER TROCHANTERIC BURSITIS OF RIGHT HIP: ICD-10-CM

## 2023-09-15 DIAGNOSIS — M54.50 LOW BACK PAIN, UNSPECIFIED BACK PAIN LATERALITY, UNSPECIFIED CHRONICITY, UNSPECIFIED WHETHER SCIATICA PRESENT: ICD-10-CM

## 2023-09-15 NOTE — PROGRESS NOTES
gotten good results from viscosupplementation in the past.  We did give her a prescription for Voltaren gel to apply to the right knee to see if it will help with her symptoms as well. Home therapy will also be arranged to help with her strength, gait, balance. She was advised on the use of a walker to help with her ambulation and prevent falls that she is certainly at an increased fall risk. We will also line up viscospplementation for the patient pending insurance approval.  We discussed how the injections were performed and the benefits they may provide for their symptoms. We also discussed discussed risks that would be associated with the patients comorbidities in regards to performing the viscosupplementation injections. After this discussion they did give us consent to proceed with scheduling viscosupplementation. We briefly discussed the fact that she does have severe DJD of the right knee and would be indicated for a right TKA. Given her current medical issues that she is dealing with as well as her acute on chronic severe low back issues, this would not be a great option to pursue at this time. There were certainly in agreement with this. The patient and her daughter were and agree with this plan. We will see with him back in the next 2 to 3 weeks to begin viscosupplementation for the right knee.       KENDELL Valencia

## 2023-09-22 ENCOUNTER — TELEPHONE (OUTPATIENT)
Dept: ORTHOPEDIC SURGERY | Age: 78
End: 2023-09-22

## 2023-09-22 NOTE — TELEPHONE ENCOUNTER
Pt called she cannot  use the cream for her knee. she takes a blood thiner,she wants  A mri she said please call . she need to know what to do to try walk

## 2023-09-28 ENCOUNTER — HOME CARE VISIT (OUTPATIENT)
Dept: SCHEDULING | Facility: HOME HEALTH | Age: 78
End: 2023-09-28
Payer: MEDICARE

## 2023-09-28 VITALS
DIASTOLIC BLOOD PRESSURE: 82 MMHG | SYSTOLIC BLOOD PRESSURE: 131 MMHG | TEMPERATURE: 98.6 F | OXYGEN SATURATION: 93 % | HEART RATE: 60 BPM | RESPIRATION RATE: 18 BRPM

## 2023-09-28 PROCEDURE — G0151 HHCP-SERV OF PT,EA 15 MIN: HCPCS

## 2023-09-28 ASSESSMENT — ENCOUNTER SYMPTOMS
COUGH: 1
COUGH CHARACTERISTICS: NON-PRODUCTIVE
DYSPNEA ACTIVITY LEVEL: AFTER AMBULATING 10 - 20 FT
PAIN LOCATION - PAIN QUALITY: DULL, ACHY

## 2023-10-05 ENCOUNTER — OFFICE VISIT (OUTPATIENT)
Dept: ORTHOPEDIC SURGERY | Age: 78
End: 2023-10-05
Payer: MEDICARE

## 2023-10-05 DIAGNOSIS — M54.16 LUMBAR RADICULOPATHY: Primary | ICD-10-CM

## 2023-10-05 DIAGNOSIS — M48.061 STENOSIS OF LATERAL RECESS OF LUMBAR SPINE: ICD-10-CM

## 2023-10-05 DIAGNOSIS — M48.061 LUMBAR STENOSIS WITHOUT NEUROGENIC CLAUDICATION: ICD-10-CM

## 2023-10-05 DIAGNOSIS — M47.816 LUMBAR FACET ARTHROPATHY: ICD-10-CM

## 2023-10-05 DIAGNOSIS — M41.9 SCOLIOSIS OF LUMBAR SPINE, UNSPECIFIED SCOLIOSIS TYPE: ICD-10-CM

## 2023-10-05 PROCEDURE — 99213 OFFICE O/P EST LOW 20 MIN: CPT | Performed by: NURSE PRACTITIONER

## 2023-10-05 PROCEDURE — 1123F ACP DISCUSS/DSCN MKR DOCD: CPT | Performed by: NURSE PRACTITIONER

## 2023-10-09 NOTE — PROGRESS NOTES
facet arthropathy  M47.816 [address] [facility]     Amb External Referral To Pain Medicine           Patient has degenerated spine. There is multiple etiologies here causing pain. I discussed with her that there is nothing that we would warrant any kind of surgery for. Ultimately I think patient would best benefit from interventional management or pain management. She is agreeable to this. We will go ahead and send referral. I will  restart her in physical therapy    - Physical Therapy was prescribed and will include stretching, strengthening and modalities to promote blood flow, flexibility and core strengthening.  - Referral to pain management. The patient's care would be best managed in a formal pain management setting and will be referred accordingly. No orders of the defined types were placed in this encounter. Orders Placed This Encounter   Procedures    [address] [facility]    Amb External Referral To Pain Medicine        3 This is stable chronic illness/condition      Return for refer to pain management. EVELYNE Regalado CNP  10/09/23      Elements of this note were created using speech recognition software. As such, errors of speech recognition may be present.

## 2023-10-10 ENCOUNTER — HOME CARE VISIT (OUTPATIENT)
Dept: SCHEDULING | Facility: HOME HEALTH | Age: 78
End: 2023-10-10
Payer: MEDICARE

## 2023-10-10 VITALS
HEART RATE: 60 BPM | OXYGEN SATURATION: 96 % | DIASTOLIC BLOOD PRESSURE: 78 MMHG | RESPIRATION RATE: 18 BRPM | SYSTOLIC BLOOD PRESSURE: 125 MMHG | TEMPERATURE: 98.6 F

## 2023-10-10 PROCEDURE — G0151 HHCP-SERV OF PT,EA 15 MIN: HCPCS

## 2023-10-10 ASSESSMENT — ENCOUNTER SYMPTOMS
DYSPNEA ACTIVITY LEVEL: AFTER AMBULATING MORE THAN 20 FT
PAIN LOCATION - PAIN QUALITY: SORE, ACHY

## 2023-10-11 ENCOUNTER — OFFICE VISIT (OUTPATIENT)
Dept: ORTHOPEDIC SURGERY | Age: 78
End: 2023-10-11
Payer: MEDICARE

## 2023-10-11 DIAGNOSIS — M17.11 PRIMARY OSTEOARTHRITIS OF RIGHT KNEE: Primary | ICD-10-CM

## 2023-10-11 PROCEDURE — 20611 DRAIN/INJ JOINT/BURSA W/US: CPT | Performed by: PHYSICIAN ASSISTANT

## 2023-10-11 NOTE — PROGRESS NOTES
Name: Mere Trimble  YOB: 1945  Gender: female  MRN: [id number]     CC: RIGHT Knee Osteoarthritis      PROCEDURE: #1 of 3 Hyaluronic Injection    After discussion of risks and benefits including but not limited to pain, infection, skin discoloration, and injury to blood vessels or nerves the patient verbally consented to proceed with injection of the RIGHT. The patient is to restrict their activity for 48 hours. Radiology Report: US guidance was used to examine the joint, ensure adequate needle placement and to decrease the risk of joint aggravation. The intracondylar notch, retropatellar fat pad, patella tendon, patella and tibia were all visualized. Pre and post injection US still images were obtained and placed in the record. Image were obtained with a Bebo ultrasound transducer (model [id number]). Procedure Note: After steriley prepping the RIGHT knee, it was injected with a 2mL of Synvisc and the medication was observed going into the intra-articular space via US guidance. The patient tolerated the procedure without difficulty.     KENDELL Minor

## 2023-10-12 ENCOUNTER — HOME CARE VISIT (OUTPATIENT)
Dept: SCHEDULING | Facility: HOME HEALTH | Age: 78
End: 2023-10-12
Payer: MEDICARE

## 2023-10-12 VITALS
DIASTOLIC BLOOD PRESSURE: 64 MMHG | OXYGEN SATURATION: 95 % | HEART RATE: 60 BPM | TEMPERATURE: 98.7 F | SYSTOLIC BLOOD PRESSURE: 122 MMHG | RESPIRATION RATE: 16 BRPM

## 2023-10-12 PROCEDURE — G0151 HHCP-SERV OF PT,EA 15 MIN: HCPCS

## 2023-10-12 ASSESSMENT — ENCOUNTER SYMPTOMS: PAIN LOCATION - PAIN QUALITY: SORE

## 2023-10-16 ENCOUNTER — CLINICAL DOCUMENTATION (OUTPATIENT)
Dept: ORTHOPEDIC SURGERY | Age: 78
End: 2023-10-16

## 2023-10-16 ENCOUNTER — HOME CARE VISIT (OUTPATIENT)
Dept: SCHEDULING | Facility: HOME HEALTH | Age: 78
End: 2023-10-16
Payer: MEDICARE

## 2023-10-16 NOTE — PROGRESS NOTES
Patient referred to [facility], patients scheduled to be seen 10/27/23 at 130pm

## 2023-10-18 ENCOUNTER — OFFICE VISIT (OUTPATIENT)
Dept: ORTHOPEDIC SURGERY | Age: 78
End: 2023-10-18
Payer: MEDICARE

## 2023-10-18 DIAGNOSIS — M17.11 PRIMARY OSTEOARTHRITIS OF RIGHT KNEE: Primary | ICD-10-CM

## 2023-10-18 PROCEDURE — 20611 DRAIN/INJ JOINT/BURSA W/US: CPT | Performed by: PHYSICIAN ASSISTANT

## 2023-10-18 NOTE — PROGRESS NOTES
Name: Didi Montenegro  YOB: 1945  Gender: female  MRN: [id number]     CC: RIGHT Knee Osteoarthritis      PROCEDURE: #2 of 3 Hyaluronic Injection    After discussion of risks and benefits including but not limited to pain, infection, skin discoloration, and injury to blood vessels or nerves the patient verbally consented to proceed with injection of the RIGHT. The patient is to restrict their activity for 48 hours. Radiology Report: US guidance was used to examine the joint, ensure adequate needle placement and to decrease the risk of joint aggravation. The intracondylar notch, retropatellar fat pad, patella tendon, patella and tibia were all visualized. Pre and post injection US still images were obtained and placed in the record. Image were obtained with a ViVu ultrasound transducer (model [id number]). Procedure Note: After steriley prepping the RIGHT knee, it was injected with a 2mL of Synvisc and the medication was observed going into the intra-articular space via US guidance. The patient tolerated the procedure without difficulty.     KENDELL Ramsey

## 2023-10-19 ENCOUNTER — HOME CARE VISIT (OUTPATIENT)
Dept: SCHEDULING | Facility: HOME HEALTH | Age: 78
End: 2023-10-19
Payer: MEDICARE

## 2023-10-19 NOTE — CASE COMMUNICATION
called patient on 10/18/23 for physical therapy visit on 10/19/23. patient states still not feeling well. requests no therapy this week and start next week.  missed physical therapy visit on 10/19/23

## 2023-10-24 ENCOUNTER — HOME CARE VISIT (OUTPATIENT)
Dept: SCHEDULING | Facility: HOME HEALTH | Age: 78
End: 2023-10-24
Payer: MEDICARE

## 2023-10-24 PROCEDURE — G0157 HHC PT ASSISTANT EA 15: HCPCS

## 2023-10-25 ENCOUNTER — OFFICE VISIT (OUTPATIENT)
Dept: ORTHOPEDIC SURGERY | Age: 78
End: 2023-10-25
Payer: MEDICARE

## 2023-10-25 VITALS
OXYGEN SATURATION: 98 % | SYSTOLIC BLOOD PRESSURE: 130 MMHG | TEMPERATURE: 97.2 F | RESPIRATION RATE: 17 BRPM | DIASTOLIC BLOOD PRESSURE: 64 MMHG | HEART RATE: 62 BPM

## 2023-10-25 DIAGNOSIS — M17.11 PRIMARY OSTEOARTHRITIS OF RIGHT KNEE: Primary | ICD-10-CM

## 2023-10-25 PROCEDURE — 20611 DRAIN/INJ JOINT/BURSA W/US: CPT | Performed by: PHYSICIAN ASSISTANT

## 2023-10-25 ASSESSMENT — ENCOUNTER SYMPTOMS: PAIN LOCATION - PAIN QUALITY: SORE. ACHES

## 2023-10-26 ENCOUNTER — HOME CARE VISIT (OUTPATIENT)
Dept: SCHEDULING | Facility: HOME HEALTH | Age: 78
End: 2023-10-26
Payer: MEDICARE

## 2023-10-26 VITALS
DIASTOLIC BLOOD PRESSURE: 68 MMHG | TEMPERATURE: 98.5 F | SYSTOLIC BLOOD PRESSURE: 122 MMHG | HEART RATE: 71 BPM | RESPIRATION RATE: 18 BRPM | OXYGEN SATURATION: 95 %

## 2023-10-26 PROCEDURE — G0151 HHCP-SERV OF PT,EA 15 MIN: HCPCS

## 2023-10-26 ASSESSMENT — ENCOUNTER SYMPTOMS
DYSPNEA ACTIVITY LEVEL: AFTER AMBULATING MORE THAN 20 FT
PAIN LOCATION - PAIN QUALITY: DULL ACHE

## 2023-10-30 NOTE — PROGRESS NOTES
Name: Nik Sport  YOB: 1945  Gender: female  MRN: [id number]     CC: RIGHT Knee Osteoarthritis      PROCEDURE: #3 of 3 Hyaluronic Injection    After discussion of risks and benefits including but not limited to pain, infection, skin discoloration, and injury to blood vessels or nerves the patient verbally consented to proceed with injection of the RIGHT. The patient is to restrict their activity for 48 hours. Radiology Report: US guidance was used to examine the joint, ensure adequate needle placement and to decrease the risk of joint aggravation. The intracondylar notch, retropatellar fat pad, patella tendon, patella and tibia were all visualized. Pre and post injection US still images were obtained and placed in the record. Image were obtained with a [employer] ultrasound transducer (model [id number]). Procedure Note: After steriley prepping the RIGHT knee, it was injected with a 2mL of Synvisc and the medication was observed going into the intra-articular space via US guidance. The patient tolerated the procedure without difficulty.     KENDELL De La Garza

## 2023-10-31 ENCOUNTER — HOME CARE VISIT (OUTPATIENT)
Dept: SCHEDULING | Facility: HOME HEALTH | Age: 78
End: 2023-10-31
Payer: MEDICARE

## 2023-10-31 VITALS
SYSTOLIC BLOOD PRESSURE: 132 MMHG | TEMPERATURE: 97 F | DIASTOLIC BLOOD PRESSURE: 70 MMHG | RESPIRATION RATE: 16 BRPM | OXYGEN SATURATION: 98 % | HEART RATE: 60 BPM

## 2023-10-31 PROCEDURE — G0157 HHC PT ASSISTANT EA 15: HCPCS

## 2023-10-31 ASSESSMENT — ENCOUNTER SYMPTOMS: PAIN LOCATION - PAIN QUALITY: THROBS. ACHES

## 2023-11-01 ENCOUNTER — TELEPHONE (OUTPATIENT)
Dept: ORTHOPEDIC SURGERY | Age: 78
End: 2023-11-01

## 2023-11-01 NOTE — CASE COMMUNICATION
patient reporting pain in right leg and into groin today. pain 8 to 10. only taking tylenol for pain. plans to try and alternate heat and ice to see if gets any relief.

## 2023-11-03 ENCOUNTER — TELEPHONE (OUTPATIENT)
Dept: ORTHOPEDIC SURGERY | Age: 78
End: 2023-11-03

## 2023-11-07 ENCOUNTER — HOME CARE VISIT (OUTPATIENT)
Dept: SCHEDULING | Facility: HOME HEALTH | Age: 78
End: 2023-11-07
Payer: MEDICARE

## 2023-11-10 ENCOUNTER — HOME CARE VISIT (OUTPATIENT)
Dept: SCHEDULING | Facility: HOME HEALTH | Age: 78
End: 2023-11-10
Payer: MEDICARE

## 2023-11-10 VITALS
TEMPERATURE: 99 F | SYSTOLIC BLOOD PRESSURE: 132 MMHG | OXYGEN SATURATION: 94 % | HEART RATE: 60 BPM | DIASTOLIC BLOOD PRESSURE: 70 MMHG | RESPIRATION RATE: 18 BRPM

## 2023-11-10 PROCEDURE — G0151 HHCP-SERV OF PT,EA 15 MIN: HCPCS

## 2023-11-10 ASSESSMENT — ENCOUNTER SYMPTOMS: PAIN LOCATION - PAIN QUALITY: THROBBING

## 2023-11-13 ENCOUNTER — TELEPHONE (OUTPATIENT)
Dept: ORTHOPEDIC SURGERY | Age: 78
End: 2023-11-13

## 2023-11-13 DIAGNOSIS — M17.11 PRIMARY OSTEOARTHRITIS OF RIGHT KNEE: Primary | ICD-10-CM

## 2023-11-16 ENCOUNTER — HOME CARE VISIT (OUTPATIENT)
Dept: SCHEDULING | Facility: HOME HEALTH | Age: 78
End: 2023-11-16
Payer: MEDICARE

## 2023-11-16 VITALS
RESPIRATION RATE: 18 BRPM | TEMPERATURE: 97.7 F | DIASTOLIC BLOOD PRESSURE: 78 MMHG | OXYGEN SATURATION: 94 % | SYSTOLIC BLOOD PRESSURE: 128 MMHG | HEART RATE: 80 BPM

## 2023-11-16 PROCEDURE — G0157 HHC PT ASSISTANT EA 15: HCPCS

## 2023-11-16 PROCEDURE — G0155 HHCP-SVS OF CSW,EA 15 MIN: HCPCS

## 2023-11-17 ENCOUNTER — HOME CARE VISIT (OUTPATIENT)
Dept: SCHEDULING | Facility: HOME HEALTH | Age: 78
End: 2023-11-17
Payer: MEDICARE

## 2023-11-21 ENCOUNTER — HOME CARE VISIT (OUTPATIENT)
Dept: SCHEDULING | Facility: HOME HEALTH | Age: 78
End: 2023-11-21
Payer: MEDICARE

## 2023-11-21 VITALS
SYSTOLIC BLOOD PRESSURE: 132 MMHG | HEART RATE: 67 BPM | OXYGEN SATURATION: 99 % | TEMPERATURE: 97.8 F | DIASTOLIC BLOOD PRESSURE: 85 MMHG | RESPIRATION RATE: 18 BRPM

## 2023-11-21 PROCEDURE — G0151 HHCP-SERV OF PT,EA 15 MIN: HCPCS

## 2023-11-21 ASSESSMENT — ENCOUNTER SYMPTOMS: PAIN LOCATION - PAIN QUALITY: DULL, ACHY

## 2023-11-24 ENCOUNTER — HOME CARE VISIT (OUTPATIENT)
Dept: HOME HEALTH SERVICES | Facility: HOME HEALTH | Age: 78
End: 2023-11-24

## 2023-11-24 VITALS
SYSTOLIC BLOOD PRESSURE: 132 MMHG | OXYGEN SATURATION: 100 % | DIASTOLIC BLOOD PRESSURE: 74 MMHG | HEART RATE: 60 BPM | RESPIRATION RATE: 18 BRPM | TEMPERATURE: 97.8 F

## 2023-11-24 PROCEDURE — G0151 HHCP-SERV OF PT,EA 15 MIN: HCPCS

## 2023-11-24 ASSESSMENT — ENCOUNTER SYMPTOMS: PAIN LOCATION - PAIN QUALITY: SORE, SHARP

## 2023-11-25 ASSESSMENT — ENCOUNTER SYMPTOMS: DYSPNEA ACTIVITY LEVEL: AFTER AMBULATING 10 - 20 FT

## 2023-11-27 ENCOUNTER — TELEPHONE (OUTPATIENT)
Dept: ORTHOPEDIC SURGERY | Age: 78
End: 2023-11-27

## 2023-11-27 NOTE — TELEPHONE ENCOUNTER
Patient says she needs a wheelchair by Friday. Emelia  says if you can fax the Rx to # [phone]. It needs her info and why she needs the wheelchair included.

## 2023-11-28 ENCOUNTER — TELEPHONE (OUTPATIENT)
Dept: ORTHOPEDIC SURGERY | Age: 78
End: 2023-11-28

## 2023-11-28 ENCOUNTER — HOME CARE VISIT (OUTPATIENT)
Dept: SCHEDULING | Facility: HOME HEALTH | Age: 78
End: 2023-11-28
Payer: MEDICARE

## 2023-11-28 VITALS
HEART RATE: 76 BPM | OXYGEN SATURATION: 97 % | TEMPERATURE: 98.3 F | SYSTOLIC BLOOD PRESSURE: 132 MMHG | RESPIRATION RATE: 18 BRPM | DIASTOLIC BLOOD PRESSURE: 74 MMHG

## 2023-11-28 PROCEDURE — G0151 HHCP-SERV OF PT,EA 15 MIN: HCPCS

## 2023-11-28 ASSESSMENT — ENCOUNTER SYMPTOMS: PAIN LOCATION - PAIN QUALITY: SORE, SHARP

## 2023-11-28 NOTE — TELEPHONE ENCOUNTER
Patient is wanting to get a wheelchair. Please fax a RX along with the notes to # [phone]. Patient wants the wheelchair by Friday.

## 2023-11-28 NOTE — TELEPHONE ENCOUNTER
Patient is calling back, she needs 18\" wheelchair because her doorways are small in her home. She again says she needs it by Friday. She says she has rented one but can't afford to rent it again so she needs to get her own.

## 2023-11-29 ENCOUNTER — TELEPHONE (OUTPATIENT)
Dept: ORTHOPEDIC SURGERY | Age: 78
End: 2023-11-29

## 2023-11-29 NOTE — TELEPHONE ENCOUNTER
Clinton Burr from [facility] calling regarding the wheelchair. The patient has called because she needs it by Friday. [facility] has not received it yet, and she wants to know if it was sent standard or expedited. Patient is requesting a phone call.

## 2023-11-30 ENCOUNTER — HOME CARE VISIT (OUTPATIENT)
Dept: SCHEDULING | Facility: HOME HEALTH | Age: 78
End: 2023-11-30
Payer: MEDICARE

## 2023-11-30 DIAGNOSIS — M17.11 PRIMARY OSTEOARTHRITIS OF RIGHT KNEE: Primary | ICD-10-CM

## 2023-11-30 PROCEDURE — G0157 HHC PT ASSISTANT EA 15: HCPCS

## 2023-12-01 VITALS
HEART RATE: 63 BPM | OXYGEN SATURATION: 97 % | TEMPERATURE: 98.4 F | DIASTOLIC BLOOD PRESSURE: 70 MMHG | SYSTOLIC BLOOD PRESSURE: 120 MMHG | RESPIRATION RATE: 18 BRPM

## 2023-12-05 ENCOUNTER — HOME CARE VISIT (OUTPATIENT)
Dept: SCHEDULING | Facility: HOME HEALTH | Age: 78
End: 2023-12-05
Payer: MEDICARE

## 2023-12-07 ENCOUNTER — HOME CARE VISIT (OUTPATIENT)
Dept: SCHEDULING | Facility: HOME HEALTH | Age: 78
End: 2023-12-07
Payer: MEDICARE

## 2023-12-07 VITALS
HEART RATE: 72 BPM | OXYGEN SATURATION: 95 % | RESPIRATION RATE: 18 BRPM | SYSTOLIC BLOOD PRESSURE: 130 MMHG | DIASTOLIC BLOOD PRESSURE: 74 MMHG | TEMPERATURE: 98.2 F

## 2023-12-07 PROCEDURE — G0151 HHCP-SERV OF PT,EA 15 MIN: HCPCS

## 2023-12-07 ASSESSMENT — ENCOUNTER SYMPTOMS: PAIN LOCATION - PAIN QUALITY: SHARP

## 2023-12-11 ENCOUNTER — HOME CARE VISIT (OUTPATIENT)
Dept: SCHEDULING | Facility: HOME HEALTH | Age: 78
End: 2023-12-11
Payer: MEDICARE

## 2023-12-14 ENCOUNTER — HOME CARE VISIT (OUTPATIENT)
Dept: SCHEDULING | Facility: HOME HEALTH | Age: 78
End: 2023-12-14
Payer: MEDICARE

## 2023-12-14 VITALS
HEART RATE: 64 BPM | TEMPERATURE: 98.2 F | RESPIRATION RATE: 18 BRPM | OXYGEN SATURATION: 94 % | DIASTOLIC BLOOD PRESSURE: 78 MMHG | SYSTOLIC BLOOD PRESSURE: 132 MMHG

## 2023-12-14 PROCEDURE — G0151 HHCP-SERV OF PT,EA 15 MIN: HCPCS

## 2023-12-16 DIAGNOSIS — M54.50 LOW BACK PAIN, UNSPECIFIED BACK PAIN LATERALITY, UNSPECIFIED CHRONICITY, UNSPECIFIED WHETHER SCIATICA PRESENT: ICD-10-CM

## 2023-12-16 DIAGNOSIS — M17.11 PRIMARY OSTEOARTHRITIS OF RIGHT KNEE: ICD-10-CM

## 2023-12-16 DIAGNOSIS — Z96.652 TOTAL KNEE REPLACEMENT STATUS, LEFT: ICD-10-CM

## 2023-12-27 ENCOUNTER — HOME CARE VISIT (OUTPATIENT)
Dept: SCHEDULING | Facility: HOME HEALTH | Age: 78
End: 2023-12-27
Payer: MEDICARE

## 2023-12-27 VITALS
RESPIRATION RATE: 18 BRPM | DIASTOLIC BLOOD PRESSURE: 74 MMHG | TEMPERATURE: 97.9 F | HEART RATE: 60 BPM | OXYGEN SATURATION: 98 % | SYSTOLIC BLOOD PRESSURE: 124 MMHG

## 2023-12-27 PROCEDURE — G0151 HHCP-SERV OF PT,EA 15 MIN: HCPCS

## 2024-01-01 ENCOUNTER — HOME CARE VISIT (OUTPATIENT)
Dept: SCHEDULING | Facility: HOME HEALTH | Age: 79
End: 2024-01-01
Payer: MEDICARE

## 2024-01-04 ENCOUNTER — HOME CARE VISIT (OUTPATIENT)
Dept: SCHEDULING | Facility: HOME HEALTH | Age: 79
End: 2024-01-04
Payer: MEDICARE

## 2024-01-04 VITALS
DIASTOLIC BLOOD PRESSURE: 72 MMHG | OXYGEN SATURATION: 98 % | HEART RATE: 60 BPM | RESPIRATION RATE: 18 BRPM | TEMPERATURE: 98.9 F | SYSTOLIC BLOOD PRESSURE: 120 MMHG

## 2024-01-04 PROCEDURE — G0151 HHCP-SERV OF PT,EA 15 MIN: HCPCS

## 2024-01-04 ASSESSMENT — ENCOUNTER SYMPTOMS: PAIN LOCATION - PAIN QUALITY: SHARP

## 2024-01-09 ENCOUNTER — HOME CARE VISIT (OUTPATIENT)
Dept: SCHEDULING | Facility: HOME HEALTH | Age: 79
End: 2024-01-09
Payer: MEDICARE

## 2024-01-11 ENCOUNTER — HOME CARE VISIT (OUTPATIENT)
Dept: SCHEDULING | Facility: HOME HEALTH | Age: 79
End: 2024-01-11
Payer: MEDICARE

## 2024-01-11 VITALS
SYSTOLIC BLOOD PRESSURE: 132 MMHG | RESPIRATION RATE: 17 BRPM | HEART RATE: 65 BPM | OXYGEN SATURATION: 95 % | DIASTOLIC BLOOD PRESSURE: 82 MMHG | TEMPERATURE: 98.6 F

## 2024-01-11 PROCEDURE — G0151 HHCP-SERV OF PT,EA 15 MIN: HCPCS

## 2024-01-15 ENCOUNTER — HOME CARE VISIT (OUTPATIENT)
Dept: SCHEDULING | Facility: HOME HEALTH | Age: 79
End: 2024-01-15
Payer: MEDICARE

## 2024-01-15 VITALS
OXYGEN SATURATION: 95 % | TEMPERATURE: 97.9 F | RESPIRATION RATE: 18 BRPM | SYSTOLIC BLOOD PRESSURE: 128 MMHG | DIASTOLIC BLOOD PRESSURE: 70 MMHG | HEART RATE: 60 BPM

## 2024-01-15 PROCEDURE — G0151 HHCP-SERV OF PT,EA 15 MIN: HCPCS

## 2024-01-15 ASSESSMENT — ENCOUNTER SYMPTOMS: PAIN LOCATION - PAIN QUALITY: SHARP

## 2024-01-18 ENCOUNTER — HOME CARE VISIT (OUTPATIENT)
Dept: SCHEDULING | Facility: HOME HEALTH | Age: 79
End: 2024-01-18
Payer: MEDICARE

## 2024-01-18 VITALS
HEART RATE: 65 BPM | RESPIRATION RATE: 18 BRPM | OXYGEN SATURATION: 94 % | DIASTOLIC BLOOD PRESSURE: 82 MMHG | SYSTOLIC BLOOD PRESSURE: 142 MMHG | TEMPERATURE: 98.4 F

## 2024-01-18 PROCEDURE — G0151 HHCP-SERV OF PT,EA 15 MIN: HCPCS

## 2024-01-18 ASSESSMENT — ENCOUNTER SYMPTOMS
PAIN LOCATION - PAIN QUALITY: ACHY, SHARP
DYSPNEA ACTIVITY LEVEL: AFTER AMBULATING MORE THAN 20 FT

## 2024-01-23 ENCOUNTER — HOME CARE VISIT (OUTPATIENT)
Dept: SCHEDULING | Facility: HOME HEALTH | Age: 79
End: 2024-01-23
Payer: MEDICARE

## 2024-01-23 ENCOUNTER — PREP FOR PROCEDURE (OUTPATIENT)
Dept: ORTHOPEDIC SURGERY | Age: 79
End: 2024-01-23

## 2024-01-23 VITALS
HEART RATE: 60 BPM | DIASTOLIC BLOOD PRESSURE: 72 MMHG | TEMPERATURE: 98.4 F | RESPIRATION RATE: 18 BRPM | SYSTOLIC BLOOD PRESSURE: 122 MMHG

## 2024-01-23 DIAGNOSIS — M17.11 PRIMARY OSTEOARTHRITIS OF RIGHT KNEE: Primary | ICD-10-CM

## 2024-01-23 PROCEDURE — G0151 HHCP-SERV OF PT,EA 15 MIN: HCPCS

## 2024-01-23 RX ORDER — ACETAMINOPHEN 500 MG
1000 TABLET ORAL ONCE
Status: CANCELLED | OUTPATIENT
Start: 2024-01-23 | End: 2024-01-23

## 2024-01-23 ASSESSMENT — ENCOUNTER SYMPTOMS: PAIN LOCATION - PAIN QUALITY: ACHY, SHARP

## 2024-01-25 ENCOUNTER — HOME CARE VISIT (OUTPATIENT)
Dept: SCHEDULING | Facility: HOME HEALTH | Age: 79
End: 2024-01-25

## 2024-01-25 VITALS
RESPIRATION RATE: 18 BRPM | OXYGEN SATURATION: 94 % | DIASTOLIC BLOOD PRESSURE: 76 MMHG | HEART RATE: 60 BPM | SYSTOLIC BLOOD PRESSURE: 130 MMHG | TEMPERATURE: 98.5 F

## 2024-01-25 PROCEDURE — G0151 HHCP-SERV OF PT,EA 15 MIN: HCPCS

## 2024-01-25 ASSESSMENT — ENCOUNTER SYMPTOMS
PAIN LOCATION - PAIN QUALITY: SORE
DYSPNEA ACTIVITY LEVEL: AFTER AMBULATING MORE THAN 20 FT

## 2024-01-29 ENCOUNTER — HOME CARE VISIT (OUTPATIENT)
Dept: SCHEDULING | Facility: HOME HEALTH | Age: 79
End: 2024-01-29

## 2024-02-02 ENCOUNTER — HOME CARE VISIT (OUTPATIENT)
Dept: SCHEDULING | Facility: HOME HEALTH | Age: 79
End: 2024-02-02

## 2024-02-05 ENCOUNTER — HOME CARE VISIT (OUTPATIENT)
Dept: SCHEDULING | Facility: HOME HEALTH | Age: 79
End: 2024-02-05

## 2024-02-09 ENCOUNTER — HOME CARE VISIT (OUTPATIENT)
Dept: SCHEDULING | Facility: HOME HEALTH | Age: 79
End: 2024-02-09

## 2024-02-12 ENCOUNTER — TELEPHONE (OUTPATIENT)
Dept: SURGERY | Age: 79
End: 2024-02-12

## 2024-02-12 ENCOUNTER — HOSPITAL ENCOUNTER (OUTPATIENT)
Dept: REHABILITATION | Age: 79
Discharge: HOME | End: 2024-02-15
Payer: MEDICARE

## 2024-02-12 ENCOUNTER — HOSPITAL ENCOUNTER (OUTPATIENT)
Dept: SURGERY | Age: 79
Discharge: HOME | End: 2024-02-15
Payer: MEDICARE

## 2024-02-12 DIAGNOSIS — M17.11 PRIMARY OSTEOARTHRITIS OF RIGHT KNEE: ICD-10-CM

## 2024-02-12 LAB
ANION GAP SERPL CALC-SCNC: 4 MMOL/L (ref 2–11)
BASOPHILS # BLD: 0 K/UL (ref 0–0.2)
BASOPHILS NFR BLD: 0 % (ref 0–2)
BUN SERPL-MCNC: 14 MG/DL (ref 8–23)
CALCIUM SERPL-MCNC: 8.9 MG/DL (ref 8.3–10.4)
CHLORIDE SERPL-SCNC: 105 MMOL/L (ref 103–113)
CO2 SERPL-SCNC: 33 MMOL/L (ref 21–32)
CREAT SERPL-MCNC: 0.69 MG/DL (ref 0.6–1)
DIFFERENTIAL METHOD BLD: ABNORMAL
EOSINOPHIL # BLD: 0.1 K/UL (ref 0–0.8)
EOSINOPHIL NFR BLD: 1 % (ref 0.5–7.8)
ERYTHROCYTE [DISTWIDTH] IN BLOOD BY AUTOMATED COUNT: 13.8 % (ref 11.9–14.6)
EST. AVERAGE GLUCOSE BLD GHB EST-MCNC: 105 MG/DL
GLUCOSE SERPL-MCNC: 98 MG/DL (ref 65–100)
HBA1C MFR BLD: 5.3 % (ref 4.8–5.6)
HCT VFR BLD AUTO: 42.2 % (ref 35.8–46.3)
HGB BLD-MCNC: 14 G/DL (ref 11.7–15.4)
IMM GRANULOCYTES # BLD AUTO: 0 K/UL (ref 0–0.5)
IMM GRANULOCYTES NFR BLD AUTO: 0 % (ref 0–5)
INR PPP: 1.7
LYMPHOCYTES # BLD: 1.1 K/UL (ref 0.5–4.6)
LYMPHOCYTES NFR BLD: 17 % (ref 13–44)
MCH RBC QN AUTO: 28.9 PG (ref 26.1–32.9)
MCHC RBC AUTO-ENTMCNC: 33.2 G/DL (ref 31.4–35)
MCV RBC AUTO: 87 FL (ref 82–102)
MONOCYTES # BLD: 1.8 K/UL (ref 0.1–1.3)
MONOCYTES NFR BLD: 28 % (ref 4–12)
NEUTS SEG # BLD: 3.4 K/UL (ref 1.7–8.2)
NEUTS SEG NFR BLD: 53 % (ref 43–78)
NRBC # BLD: 0 K/UL (ref 0–0.2)
PLATELET # BLD AUTO: 212 K/UL (ref 150–450)
PMV BLD AUTO: 11.6 FL (ref 9.4–12.3)
POTASSIUM SERPL-SCNC: 2.9 MMOL/L (ref 3.5–5.1)
PROTHROMBIN TIME: 19.4 SEC (ref 11.3–14.9)
RBC # BLD AUTO: 4.85 M/UL (ref 4.05–5.2)
SODIUM SERPL-SCNC: 142 MMOL/L (ref 136–146)
WBC # BLD AUTO: 6.4 K/UL (ref 4.3–11.1)

## 2024-02-12 PROCEDURE — 80048 BASIC METABOLIC PNL TOTAL CA: CPT

## 2024-02-12 PROCEDURE — 83036 HEMOGLOBIN GLYCOSYLATED A1C: CPT

## 2024-02-12 PROCEDURE — 87641 MR-STAPH DNA AMP PROBE: CPT

## 2024-02-12 PROCEDURE — 97161 PT EVAL LOW COMPLEX 20 MIN: CPT

## 2024-02-12 PROCEDURE — 85610 PROTHROMBIN TIME: CPT

## 2024-02-12 PROCEDURE — 94760 N-INVAS EAR/PLS OXIMETRY 1: CPT

## 2024-02-12 PROCEDURE — 94664 DEMO&/EVAL PT USE INHALER: CPT

## 2024-02-12 PROCEDURE — 85025 COMPLETE CBC W/AUTO DIFF WBC: CPT

## 2024-02-12 NOTE — PROGRESS NOTES
Fidelia Marrufo  : 1945  Primary: [employer]  Secondary:  [facility] [place] [address]  [phone]      Physical Therapy Prehab Evaluation Summary:2024   Time In/Out   PT Charge Capture  Episode     MEDICAL/REFERRING DIAGNOSIS: Unilateral primary osteoarthritis, right knee [M17.11]  REFERRING PHYSICIAN: Felipe Coronel MD    Treatment Diagnosis:   Pain in Right Knee (M25.561)  Stiffness of Right Knee, Not elsewhere classified (M25.661)  Difficulty in walking, Not elsewhere classified (R26.2)  Other abnormalities of gait and mobility (R26.89)    DATE OF SURGERY: 24  Assessment:   COMMENTS:  Ms. Marrufo is present for a Prehab Physical Therapy Assessment for their upcoming right TKA. They are here with her daughter . After discussing the surgical admission options and discharge plans, they are planning on discharging to rehab if accepted.     Pt has had left tka. Pt presents in a wheelchair.    PROBLEM LIST:   (Impacting functional limitations):  Ms. Marrufo presents with the following lower extremity(s) problems:  Transfers  Gait  Strength  Range of Motion  Balance  Home Exercise Program  Pain INTERVENTIONS PLANNED:   (Benefits and precautions of physical therapy have been discussed with the patient.)  Home Exercise Program  Educational Discussion       GOALS: (Goals have been discussed and agreed upon with patient.)  Discharge Goals: Time Frame: 1 Day  Patient will demonstrate independence with a home exercise program designed to increase strength, range of motion, balance, coordination, functional technique, and pain control to minimize functional deficits and optimize patient for total joint replacement.    Subjective:   Past Medical History/Comorbidities:   Ms. Marrufo  has a past medical history of Acquired stenosis of external ear canal unspecified as to cause, Anemia, Anticoagulant long-term use, Anxiety

## 2024-02-12 NOTE — PERIOP NOTE
CARDIAC CLEARANCE    Surgical, Procedural, or Medication Clearance    Physician or Practice Requesting Clearance: [facility]  : Tomeka Mckenna RN  Contact Phone Number: [phone]  Contact Fax Number: [phone]  Date of Surgery/Procedure: 2/22/24  Type of Surgery or Procedure: right TKA  Type of Anesthesia: spinal  Medication to hold: Xarelto  Requested # of days to hold: 3 days (72 hours)      Please do not respond via e-mail to this message.

## 2024-02-12 NOTE — PERIOP NOTE
K+ and Protime results to be reviewed by anesthesia-charge nurse to f/u. Labs automatically routed to ordering provider via Epic documentation.    K+ results sent via telephone encounter to Lubna Jang NP over joint camp per protocol.        Latest Reference Range & Units 02/12/24 13:00   Sodium 136 - 146 mmol/L 142   Potassium 3.5 - 5.1 mmol/L 2.9 (L)   Chloride 103 - 113 mmol/L 105   CO2 21 - 32 mmol/L 33 (H)   BUN,BUNPL 8 - 23 MG/DL 14   Creatinine 0.6 - 1.0 MG/DL 0.69   Anion Gap 2 - 11 mmol/L 4   Est, Glom Filt Rate >60 ml/min/1.73m2 >60   Glucose, Random 65 - 100 mg/dL 98   CALCIUM, SERUM, 803805 8.3 - 10.4 MG/DL 8.9   Hemoglobin A1C 4.8 - 5.6 % 5.3   eAG (mg/dL) mg/dL 105   WBC 4.3 - 11.1 K/uL 6.4   RBC 4.05 - 5.2 M/uL 4.85   Hemoglobin Quant 11.7 - 15.4 g/dL 14.0   Hematocrit 35.8 - 46.3 % 42.2   MCV 82.0 - 102.0 FL 87.0   MCH 26.1 - 32.9 PG 28.9   MCHC 31.4 - 35.0 g/dL 33.2   MPV 9.4 - 12.3 FL 11.6   RDW 11.9 - 14.6 % 13.8   Platelet Count 150 - 450 K/uL 212   Neutrophils % 43 - 78 % 53   Lymphocyte % 13 - 44 % 17   Monocytes % 4.0 - 12.0 % 28 (H)   Eosinophils % 0.5 - 7.8 % 1   Basophils % 0.0 - 2.0 % 0   Neutrophils Absolute 1.7 - 8.2 K/UL 3.4   Lymphocytes Absolute 0.5 - 4.6 K/UL 1.1   Monocytes Absolute 0.1 - 1.3 K/UL 1.8 (H)   Eosinophils Absolute 0.0 - 0.8 K/UL 0.1   Basophils Absolute 0.0 - 0.2 K/UL 0.0   Differential Type -   AUTOMATED   Immature Granulocytes 0.0 - 5.0 % 0   Nucleated Red Blood Cells 0.0 - 0.2 K/uL 0.00   Absolute Immature Granulocyte 0.0 - 0.5 K/UL 0.0   Prothrombin Time 11.3 - 14.9 sec 19.4 (H)   INR -   1.7   (L): Data is abnormally low  (H): Data is abnormally high

## 2024-02-12 NOTE — PERIOP NOTE
PLEASE CONTINUE TAKING ALL PRESCRIPTION MEDICATIONS UP TO THE DAY OF SURGERY UNLESS OTHERWISE DIRECTED BELOW.     DISCONTINUE all over the counter vitamins, supplements, and herbals starting today. DISCONTINUE Non-Steroidal Anti-Inflammatory (NSAIDS) such as Advil, Ibuprofen, Motrin, Naproxen, and Aleve 5 days prior to surgery.      *IT IS OK TO TAKE TYLENOL, Allergy medication, and indigestion medications*     Prescription medications to HOLD     Hold Xarelto 3 days (72 hours) prior to surgery               CONTINUE all other prescriptions like normal up until the day of surgery--TAKE ONLY THE BELOW MEDICATIONS THE DAY OF SURGERY.    Buspirone, Albuterol inhaler if needed, Arnuity inhaler    Diltiazem, Omeprazole, Flecainide, Levothyroxine          Comments   The day before surgery please take 2 Tylenol in the morning and then again before bed. You may use either regular or extra strength.        Bring Inhalers, Dynahex soap and incentive spirometer back to the hospital.          Please do not bring home medications with you on the day of surgery unless otherwise directed by your nurse.  If you are instructed to bring home medications, please give them to your nurse as they will be administered by the nursing staff.     If you have any questions, please call [facility] [phone].     A copy of this note was provided to the patient for reference.

## 2024-02-12 NOTE — PERIOP NOTE
Patient verified name and .    Order for consent found in EHR and matches case posting; patient verified.     Type 3 surgery, joint assessment complete.    Labs per surgeon: CBC,BMP, PT/INR, HgbA1c; results pending.  Labs per anesthesia protocol: no additional  EKG: Completed 10/27/23; results within anesthesia guidelines.     Pt has a pacemaker; \"[facility] Pacemaker\" added to case posting. Copy of pacemaker card scanned into EHR.     Cardiology office note (24), Pacemaker Interrogation (23), and Echo (23) located in EHR if needed.     Pt reports she tested positive for Covid ~ 3 weeks ago (24). Symptoms included cough, congestion, fatigue, and weakness. Pt's surgery is scheduled for 24. Anesthesia to review and determine if sx needs to be postponed.     MRSA/MSSA swab collected; pharmacy to review and dose antibiotic as appropriate.     Hospital approved surgical skin cleanser and instructions to return bottle on DOS given per hospital policy.    Patient provided with handouts including Guide to Surgery, Pain Management, Hand Hygiene, Blood Transfusion Education, and [facility] Brochure.    Patient answered medical/surgical history questions at their best of ability. All prior to admission medications documented in [facility]. Original medication prescription bottle NOT visualized during patient appointment.     Patient instructed to hold all vitamins 3 weeks prior to surgery and NSAIDS 5 days prior to surgery.     Pt instructed to hold Xarelto 3 days (72 hours) prior to sx. Clearance to hold Xarelto 3 days prior to surgery requested from [facility] via EHR. Charge nurse to f/u.     Patient teach back successful and patient demonstrates knowledge of instruction.

## 2024-02-13 ENCOUNTER — HOME CARE VISIT (OUTPATIENT)
Dept: SCHEDULING | Facility: HOME HEALTH | Age: 79
End: 2024-02-13
Payer: MEDICARE

## 2024-02-13 ENCOUNTER — TELEPHONE (OUTPATIENT)
Dept: SURGERY | Age: 79
End: 2024-02-13

## 2024-02-13 VITALS
DIASTOLIC BLOOD PRESSURE: 60 MMHG | OXYGEN SATURATION: 95 % | HEART RATE: 60 BPM | TEMPERATURE: 98.2 F | RESPIRATION RATE: 16 BRPM | SYSTOLIC BLOOD PRESSURE: 134 MMHG

## 2024-02-13 LAB
MRSA DNA SPEC QL NAA+PROBE: NOT DETECTED
S AUREUS CPE NOSE QL NAA+PROBE: NOT DETECTED

## 2024-02-13 RX ORDER — POTASSIUM CHLORIDE 750 MG/1
10 TABLET, EXTENDED RELEASE ORAL DAILY
Qty: 30 TABLET | Refills: 0 | Status: SHIPPED | OUTPATIENT
Start: 2024-02-13

## 2024-02-13 NOTE — TELEPHONE ENCOUNTER
Spoke with patient and she is aware that her potassium is low and she is to take one tablet 10 meq daily. Will recheck potassium DOS. She is no Lasix without potassium supplement. Confirmed that her PCP is Paul Slater

## 2024-02-15 ENCOUNTER — HOME CARE VISIT (OUTPATIENT)
Dept: SCHEDULING | Facility: HOME HEALTH | Age: 79
End: 2024-02-15
Payer: MEDICARE

## 2024-02-15 VITALS
HEART RATE: 60 BPM | DIASTOLIC BLOOD PRESSURE: 84 MMHG | SYSTOLIC BLOOD PRESSURE: 132 MMHG | RESPIRATION RATE: 18 BRPM | TEMPERATURE: 98.3 F | OXYGEN SATURATION: 95 %

## 2024-02-15 PROCEDURE — G0157 HHC PT ASSISTANT EA 15: HCPCS

## 2024-02-15 NOTE — PROGRESS NOTES
Dr. Macdonald reviewed chart. New order received \"contact PCP for K+ supplement.\" Ok to proceed. Voicemail left for patient. Message left for Dr. Paul Slater's nurse at [facility].      
calculate BMI.   Vitals:    02/12/24 1300   BP:    Pulse: (P) 60   Resp:    Temp:    SpO2: (P) 95%     Neck circumference:   44   cm    Loud snoring:                                                 YES             Witnessed apnea or wakening gasping or choking:            APNEA  Awakens with headaches:                                               DENIES  Morning or daytime tiredness/ sleepiness:                          TIRED  Dry mouth or sore throat in morning:            YES                                          Hoyt stage:  3                                   SACS score:  Stop Bang                                     PT NON-COMPLIANT with CPAP.   Dangers of non-compliance with treatment of BRENT explained to pt.  BRENT handouts given to pt.    ALBUTEROL Q 6 PRN  CONT. SAT MONITOR HS after surgery.  O2 @ 3 lpm NC HS   RESPIRATORY ASSESSMENT Q SHIFT.                                       Referrals:    Pt. Phone Number:    
occult stool blood test 10/13/2010    S/P placement of cardiac pacemaker 09/08/2023    Skin lesion 8/16/2011    Tachy-ap syndrome (HCC)     s/p PPM- recent device check shows normal function and primarily a pacing    Thrombocytopenia (HCC) 2/28/2013    Unspecified sleep apnea     Does not wear a c-pap     Urinary frequency 3/28/2013    Vertigo 12/9/2010      Past Surgical History:   Procedure Laterality Date    COLONOSCOPY  2010    polyps    PACEMAKER INSERTION  09/08/2023    NM UNLISTED PROCEDURE CARDIAC SURGERY      ablation    TOTAL KNEE ARTHROPLASTY Left 10/2020    US GUIDED CORE BREAST BIOPSY Right 1966     Family History   Problem Relation Age of Onset    Diabetes Brother     Asthma Father     Breast Cancer Neg Hx     Hypertension Mother     Diabetes Mother       Social History     Tobacco Use    Smoking status: Never    Smokeless tobacco: Never   Substance Use Topics    Alcohol use: No       Prior to Admission medications    Medication Sig Start Date End Date Taking? Authorizing Provider   potassium chloride (KLOR-CON M) 10 MEQ extended release tablet Take 1 tablet by mouth daily 2/13/24  Yes Lubna Jang, APRN - CNP   polyethylene glycol (GLYCOLAX) 17 g packet Take 1 packet by mouth daily as needed for Constipation    Provider, MD Clara   acetaminophen (TYLENOL) 500 MG tablet Take 1 tablet by mouth every 4 hours as needed for Pain    Provider, MD Clara   diclofenac sodium (VOLTAREN) 1 % GEL Apply 4g to the affected area 4 times daily. 9/15/23   Felipe Coronel MD   busPIRone (BUSPAR) 5 MG tablet Take 2 tablets by mouth 3 times daily 2/4/23   Provider, MD Clara   albuterol sulfate  (90 Base) MCG/ACT inhaler Inhale 1 puff into the lungs every 4 hours as needed for Shortness of Breath    Automatic Reconciliation, Ar   cyanocobalamin 1000 MCG tablet Take 1 tablet by mouth daily    Automatic Reconciliation, Ar   dilTIAZem (TIAZAC) 360 MG extended release capsule Take 1 capsule

## 2024-02-16 ENCOUNTER — OFFICE VISIT (OUTPATIENT)
Dept: ORTHOPEDIC SURGERY | Age: 79
End: 2024-02-16

## 2024-02-16 DIAGNOSIS — M17.11 PRIMARY OSTEOARTHRITIS OF RIGHT KNEE: Primary | ICD-10-CM

## 2024-02-16 NOTE — PROGRESS NOTES
Name: Fidelia Marrufo  YOB: 1945  Gender: female  MRN: [id number]    Pre-Op     CC: RIGHT KNEE PAIN       Patient is present today for preop regarding her right TKA for next week.  However, she notes that over the past 2 to 3 months she is developed increasing swelling in both lower extremities.  States that her family doctor as well as her cardiologist put her on 2 different fluid pills, which she notes have not helped to reduce the swelling.  She does note that she has had COVID recently and has been extremely sedentary.  Also notes she is become very lethargic after having COVID.  Today she presents in a wheelchair, but ambulates reasonably well at home with use of a walker.    Physical exam: Skin over the bilateral lower extremities shows 2+ pitting edema from the upper two thirds of the calf and shin down.  There is evidence of chronic venous stasis with some erythema over the bilateral ankles.  2+ distal pulses are noted.  Negative Homans' sign.    X-rays: AP, lateral and merchant views of the right knee shows severe medial joint space narrowing with bone-on-bone formation.  Overall impression is severe right knee DJD    Impression: Severe right knee DJD with severe swelling in the bilateral lower extremities    Plan: After discussing with the patient and her daughter, we will postpone her elective right TKA until her swelling can become under better control.  The amount of swelling in the location that she has at this time would certainly pose an issue for wound healing.  They are both counseled on this and after this discussion both agree with postponing the surgery.  She does have a follow-up with her family doctor on Monday, and hopefully more guidance can be given to the situation at that time.  Once improvement is made in terms of her swelling, she will call back and we can reevaluate this issue and potentially reschedule her surgery at that time.    KENDELL Meza

## 2024-02-19 ENCOUNTER — HOME CARE VISIT (OUTPATIENT)
Dept: HOME HEALTH SERVICES | Facility: HOME HEALTH | Age: 79
End: 2024-02-19
Payer: MEDICARE

## 2024-02-23 ENCOUNTER — HOME CARE VISIT (OUTPATIENT)
Dept: SCHEDULING | Facility: HOME HEALTH | Age: 79
End: 2024-02-23
Payer: MEDICARE

## 2024-02-23 VITALS
RESPIRATION RATE: 18 BRPM | DIASTOLIC BLOOD PRESSURE: 82 MMHG | OXYGEN SATURATION: 96 % | TEMPERATURE: 98.1 F | SYSTOLIC BLOOD PRESSURE: 130 MMHG | HEART RATE: 68 BPM

## 2024-02-23 PROCEDURE — G0151 HHCP-SERV OF PT,EA 15 MIN: HCPCS

## 2024-02-23 ASSESSMENT — ENCOUNTER SYMPTOMS
CONSTIPATION: 1
DYSPNEA ACTIVITY LEVEL: AFTER AMBULATING MORE THAN 20 FT
PAIN LOCATION - PAIN QUALITY: ACHY

## 2024-02-27 ENCOUNTER — TELEPHONE (OUTPATIENT)
Dept: ORTHOPEDIC SURGERY | Age: 79
End: 2024-02-27

## 2024-02-28 ENCOUNTER — HOME CARE VISIT (OUTPATIENT)
Dept: SCHEDULING | Facility: HOME HEALTH | Age: 79
End: 2024-02-28

## 2024-02-28 ENCOUNTER — TELEPHONE (OUTPATIENT)
Dept: ORTHOPEDIC SURGERY | Age: 79
End: 2024-02-28

## 2024-02-28 NOTE — TELEPHONE ENCOUNTER
Mena is calling to report they were going to go out today to evaluate her and she requested they wait until next week to do that. She is just letting you know that they will try again next week.

## 2024-02-29 ENCOUNTER — TELEPHONE (OUTPATIENT)
Dept: ORTHOPEDIC SURGERY | Age: 79
End: 2024-02-29

## 2024-02-29 NOTE — TELEPHONE ENCOUNTER
Pt would like for JS to call her she would like to discuss being put back on the schedule for sx asap

## 2024-03-01 ENCOUNTER — HOME CARE VISIT (OUTPATIENT)
Dept: SCHEDULING | Facility: HOME HEALTH | Age: 79
End: 2024-03-01

## 2024-03-05 ENCOUNTER — HOME CARE VISIT (OUTPATIENT)
Dept: SCHEDULING | Facility: HOME HEALTH | Age: 79
End: 2024-03-05
Payer: MEDICARE

## 2024-03-05 VITALS
OXYGEN SATURATION: 98 % | DIASTOLIC BLOOD PRESSURE: 82 MMHG | SYSTOLIC BLOOD PRESSURE: 122 MMHG | TEMPERATURE: 98 F | HEART RATE: 62 BPM | RESPIRATION RATE: 18 BRPM

## 2024-03-05 PROCEDURE — G0151 HHCP-SERV OF PT,EA 15 MIN: HCPCS

## 2024-03-05 ASSESSMENT — ENCOUNTER SYMPTOMS: PAIN LOCATION - PAIN QUALITY: ACHY

## 2024-03-08 ENCOUNTER — HOME CARE VISIT (OUTPATIENT)
Dept: SCHEDULING | Facility: HOME HEALTH | Age: 79
End: 2024-03-08
Payer: MEDICARE

## 2024-03-08 VITALS
SYSTOLIC BLOOD PRESSURE: 132 MMHG | DIASTOLIC BLOOD PRESSURE: 78 MMHG | HEART RATE: 61 BPM | RESPIRATION RATE: 18 BRPM | OXYGEN SATURATION: 94 % | TEMPERATURE: 98.2 F

## 2024-03-08 PROCEDURE — G0151 HHCP-SERV OF PT,EA 15 MIN: HCPCS

## 2024-03-12 ENCOUNTER — HOME CARE VISIT (OUTPATIENT)
Dept: SCHEDULING | Facility: HOME HEALTH | Age: 79
End: 2024-03-12
Payer: MEDICARE

## 2024-03-15 ENCOUNTER — HOME CARE VISIT (OUTPATIENT)
Dept: SCHEDULING | Facility: HOME HEALTH | Age: 79
End: 2024-03-15
Payer: MEDICARE

## 2024-03-19 ENCOUNTER — HOME CARE VISIT (OUTPATIENT)
Dept: SCHEDULING | Facility: HOME HEALTH | Age: 79
End: 2024-03-19
Payer: MEDICARE

## 2024-03-19 VITALS
RESPIRATION RATE: 18 BRPM | DIASTOLIC BLOOD PRESSURE: 78 MMHG | OXYGEN SATURATION: 98 % | SYSTOLIC BLOOD PRESSURE: 132 MMHG | TEMPERATURE: 97.8 F | HEART RATE: 68 BPM

## 2024-03-19 PROCEDURE — G0151 HHCP-SERV OF PT,EA 15 MIN: HCPCS

## 2024-03-19 ASSESSMENT — ENCOUNTER SYMPTOMS: PAIN LOCATION - PAIN QUALITY: ACHY

## 2024-03-21 ENCOUNTER — HOME CARE VISIT (OUTPATIENT)
Dept: SCHEDULING | Facility: HOME HEALTH | Age: 79
End: 2024-03-21

## 2024-03-21 PROCEDURE — G0151 HHCP-SERV OF PT,EA 15 MIN: HCPCS

## 2024-03-22 VITALS
DIASTOLIC BLOOD PRESSURE: 82 MMHG | SYSTOLIC BLOOD PRESSURE: 126 MMHG | RESPIRATION RATE: 18 BRPM | HEART RATE: 67 BPM | TEMPERATURE: 98 F | OXYGEN SATURATION: 95 %

## 2024-03-22 ASSESSMENT — ENCOUNTER SYMPTOMS
DYSPNEA ACTIVITY LEVEL: AFTER AMBULATING 10 - 20 FT
PAIN LOCATION - PAIN QUALITY: THROBBING

## 2024-03-26 ENCOUNTER — HOME CARE VISIT (OUTPATIENT)
Dept: SCHEDULING | Facility: HOME HEALTH | Age: 79
End: 2024-03-26
Payer: MEDICARE

## 2024-03-26 VITALS
OXYGEN SATURATION: 93 % | RESPIRATION RATE: 18 BRPM | TEMPERATURE: 97.9 F | SYSTOLIC BLOOD PRESSURE: 138 MMHG | DIASTOLIC BLOOD PRESSURE: 82 MMHG | HEART RATE: 61 BPM

## 2024-03-26 PROCEDURE — G0151 HHCP-SERV OF PT,EA 15 MIN: HCPCS

## 2024-03-26 ASSESSMENT — ENCOUNTER SYMPTOMS: PAIN LOCATION - PAIN QUALITY: MUSCLE SPASMS

## 2024-03-28 ENCOUNTER — HOME CARE VISIT (OUTPATIENT)
Dept: SCHEDULING | Facility: HOME HEALTH | Age: 79
End: 2024-03-28
Payer: MEDICARE

## 2024-03-28 VITALS
SYSTOLIC BLOOD PRESSURE: 148 MMHG | OXYGEN SATURATION: 97 % | TEMPERATURE: 98.3 F | RESPIRATION RATE: 18 BRPM | HEART RATE: 67 BPM | DIASTOLIC BLOOD PRESSURE: 90 MMHG

## 2024-03-28 PROCEDURE — G0151 HHCP-SERV OF PT,EA 15 MIN: HCPCS

## 2024-03-28 ASSESSMENT — ENCOUNTER SYMPTOMS: PAIN LOCATION - PAIN QUALITY: THROBBING

## 2024-03-29 ENCOUNTER — HOME CARE VISIT (OUTPATIENT)
Dept: SCHEDULING | Facility: HOME HEALTH | Age: 79
End: 2024-03-29
Payer: MEDICARE

## 2024-03-29 PROCEDURE — G0155 HHCP-SVS OF CSW,EA 15 MIN: HCPCS

## 2024-04-01 ENCOUNTER — HOME CARE VISIT (OUTPATIENT)
Dept: HOME HEALTH SERVICES | Facility: HOME HEALTH | Age: 79
End: 2024-04-01
Payer: MEDICARE

## 2024-04-02 ENCOUNTER — HOME CARE VISIT (OUTPATIENT)
Dept: SCHEDULING | Facility: HOME HEALTH | Age: 79
End: 2024-04-02
Payer: MEDICARE

## 2024-04-02 VITALS
RESPIRATION RATE: 16 BRPM | HEART RATE: 72 BPM | SYSTOLIC BLOOD PRESSURE: 128 MMHG | OXYGEN SATURATION: 98 % | TEMPERATURE: 98.2 F | DIASTOLIC BLOOD PRESSURE: 72 MMHG

## 2024-04-02 PROCEDURE — G0157 HHC PT ASSISTANT EA 15: HCPCS

## 2024-04-02 ASSESSMENT — ENCOUNTER SYMPTOMS: PAIN LOCATION - PAIN QUALITY: ACHES

## 2024-04-03 ENCOUNTER — HOME CARE VISIT (OUTPATIENT)
Dept: SCHEDULING | Facility: HOME HEALTH | Age: 79
End: 2024-04-03
Payer: MEDICARE

## 2024-04-03 VITALS
RESPIRATION RATE: 18 BRPM | HEART RATE: 75 BPM | TEMPERATURE: 97.8 F | SYSTOLIC BLOOD PRESSURE: 160 MMHG | OXYGEN SATURATION: 95 % | DIASTOLIC BLOOD PRESSURE: 100 MMHG

## 2024-04-03 PROCEDURE — G0299 HHS/HOSPICE OF RN EA 15 MIN: HCPCS

## 2024-04-03 ASSESSMENT — ENCOUNTER SYMPTOMS
CONSTIPATION: 1
DIARRHEA: 1

## 2024-04-05 ENCOUNTER — HOME CARE VISIT (OUTPATIENT)
Dept: SCHEDULING | Facility: HOME HEALTH | Age: 79
End: 2024-04-05
Payer: MEDICARE

## 2024-04-05 NOTE — CASE COMMUNICATION
Had scheduled physical therapy visit for 4/5/24. Patient called therapist before scheduled time and cancelled due  to having a bad bout of vertigo. missed visit on 4/5/24

## 2024-04-07 ENCOUNTER — HOME CARE VISIT (OUTPATIENT)
Dept: HOME HEALTH SERVICES | Facility: HOME HEALTH | Age: 79
End: 2024-04-07
Payer: MEDICARE

## 2024-04-08 ENCOUNTER — HOME CARE VISIT (OUTPATIENT)
Dept: SCHEDULING | Facility: HOME HEALTH | Age: 79
End: 2024-04-08
Payer: MEDICARE

## 2024-04-08 VITALS
RESPIRATION RATE: 18 BRPM | DIASTOLIC BLOOD PRESSURE: 66 MMHG | TEMPERATURE: 98.6 F | SYSTOLIC BLOOD PRESSURE: 114 MMHG | OXYGEN SATURATION: 96 % | HEART RATE: 64 BPM

## 2024-04-08 PROCEDURE — G0299 HHS/HOSPICE OF RN EA 15 MIN: HCPCS

## 2024-04-08 ASSESSMENT — ENCOUNTER SYMPTOMS
STOOL DESCRIPTION: SOFT FORMED
PAIN LOCATION - PAIN QUALITY: ACHY
DYSPNEA ACTIVITY LEVEL: AFTER AMBULATING MORE THAN 20 FT

## 2024-04-11 ENCOUNTER — HOME CARE VISIT (OUTPATIENT)
Dept: SCHEDULING | Facility: HOME HEALTH | Age: 79
End: 2024-04-11
Payer: MEDICARE

## 2024-04-11 VITALS
TEMPERATURE: 98.7 F | RESPIRATION RATE: 16 BRPM | HEART RATE: 68 BPM | DIASTOLIC BLOOD PRESSURE: 74 MMHG | SYSTOLIC BLOOD PRESSURE: 110 MMHG | OXYGEN SATURATION: 96 %

## 2024-04-11 PROCEDURE — G0299 HHS/HOSPICE OF RN EA 15 MIN: HCPCS

## 2024-04-11 PROCEDURE — G0157 HHC PT ASSISTANT EA 15: HCPCS

## 2024-04-11 ASSESSMENT — ENCOUNTER SYMPTOMS: STOOL DESCRIPTION: SOFT FORMED

## 2024-04-12 ENCOUNTER — HOME CARE VISIT (OUTPATIENT)
Dept: HOME HEALTH SERVICES | Facility: HOME HEALTH | Age: 79
End: 2024-04-12
Payer: MEDICARE

## 2024-04-12 VITALS
SYSTOLIC BLOOD PRESSURE: 110 MMHG | DIASTOLIC BLOOD PRESSURE: 74 MMHG | OXYGEN SATURATION: 96 % | TEMPERATURE: 98.7 F | HEART RATE: 68 BPM | RESPIRATION RATE: 16 BRPM

## 2024-04-12 ASSESSMENT — ENCOUNTER SYMPTOMS: PAIN LOCATION - PAIN QUALITY: ACHES SORE

## 2024-04-12 NOTE — CASE COMMUNICATION
patient cancelled physical therapy visit for 4/12/24 stating worn out from doctor appointments this week. patient requests visit next week

## 2024-04-15 ENCOUNTER — HOME CARE VISIT (OUTPATIENT)
Dept: SCHEDULING | Facility: HOME HEALTH | Age: 79
End: 2024-04-15
Payer: MEDICARE

## 2024-04-15 VITALS
RESPIRATION RATE: 18 BRPM | DIASTOLIC BLOOD PRESSURE: 92 MMHG | SYSTOLIC BLOOD PRESSURE: 146 MMHG | OXYGEN SATURATION: 94 % | TEMPERATURE: 99 F | HEART RATE: 66 BPM

## 2024-04-15 PROCEDURE — G0157 HHC PT ASSISTANT EA 15: HCPCS

## 2024-04-15 PROCEDURE — G0299 HHS/HOSPICE OF RN EA 15 MIN: HCPCS

## 2024-04-15 ASSESSMENT — ENCOUNTER SYMPTOMS: PAIN LOCATION - PAIN QUALITY: ACHE

## 2024-04-16 ENCOUNTER — OFFICE VISIT (OUTPATIENT)
Dept: ORTHOPEDIC SURGERY | Age: 79
End: 2024-04-16
Payer: MEDICARE

## 2024-04-16 VITALS
SYSTOLIC BLOOD PRESSURE: 146 MMHG | DIASTOLIC BLOOD PRESSURE: 92 MMHG | OXYGEN SATURATION: 94 % | RESPIRATION RATE: 18 BRPM | TEMPERATURE: 99 F | HEART RATE: 66 BPM

## 2024-04-16 DIAGNOSIS — M54.2 NECK PAIN: Primary | ICD-10-CM

## 2024-04-16 DIAGNOSIS — G20.B1 PARKINSON'S DISEASE WITH DYSKINESIA, UNSPECIFIED WHETHER MANIFESTATIONS FLUCTUATE (HCC): ICD-10-CM

## 2024-04-16 DIAGNOSIS — M40.10: ICD-10-CM

## 2024-04-16 DIAGNOSIS — M40.14 OTHER SECONDARY KYPHOSIS, THORACIC REGION: ICD-10-CM

## 2024-04-16 DIAGNOSIS — G70.9: ICD-10-CM

## 2024-04-16 DIAGNOSIS — G20.A1 PARKINSON'S DISEASE, UNSPECIFIED WHETHER DYSKINESIA PRESENT, UNSPECIFIED WHETHER MANIFESTATIONS FLUCTUATE (HCC): ICD-10-CM

## 2024-04-16 DIAGNOSIS — M41.9 SCOLIOSIS OF LUMBAR SPINE, UNSPECIFIED SCOLIOSIS TYPE: ICD-10-CM

## 2024-04-16 DIAGNOSIS — M50.30 DEGENERATIVE DISC DISEASE, CERVICAL: ICD-10-CM

## 2024-04-16 PROCEDURE — 1123F ACP DISCUSS/DSCN MKR DOCD: CPT | Performed by: NURSE PRACTITIONER

## 2024-04-16 PROCEDURE — 99214 OFFICE O/P EST MOD 30 MIN: CPT | Performed by: NURSE PRACTITIONER

## 2024-04-16 RX ORDER — PROCHLORPERAZINE MALEATE 5 MG/1
TABLET ORAL
COMMUNITY
Start: 2024-03-14

## 2024-04-16 RX ORDER — METOPROLOL SUCCINATE 25 MG/1
12.5 TABLET, EXTENDED RELEASE ORAL DAILY PRN
COMMUNITY
Start: 2024-03-15

## 2024-04-16 RX ORDER — ONDANSETRON 4 MG/1
TABLET, ORALLY DISINTEGRATING ORAL
COMMUNITY
Start: 2024-03-13

## 2024-04-16 ASSESSMENT — ENCOUNTER SYMPTOMS: PAIN LOCATION - PAIN QUALITY: SORE.THROB

## 2024-04-18 ENCOUNTER — HOME CARE VISIT (OUTPATIENT)
Dept: SCHEDULING | Facility: HOME HEALTH | Age: 79
End: 2024-04-18
Payer: MEDICARE

## 2024-04-18 VITALS
SYSTOLIC BLOOD PRESSURE: 118 MMHG | RESPIRATION RATE: 18 BRPM | TEMPERATURE: 98 F | OXYGEN SATURATION: 98 % | HEART RATE: 68 BPM | DIASTOLIC BLOOD PRESSURE: 72 MMHG

## 2024-04-18 VITALS
RESPIRATION RATE: 18 BRPM | HEART RATE: 64 BPM | SYSTOLIC BLOOD PRESSURE: 158 MMHG | OXYGEN SATURATION: 95 % | DIASTOLIC BLOOD PRESSURE: 78 MMHG | TEMPERATURE: 97.8 F

## 2024-04-18 PROCEDURE — G0299 HHS/HOSPICE OF RN EA 15 MIN: HCPCS

## 2024-04-18 PROCEDURE — G0151 HHCP-SERV OF PT,EA 15 MIN: HCPCS

## 2024-04-18 ASSESSMENT — ENCOUNTER SYMPTOMS
DYSPNEA ACTIVITY LEVEL: AFTER AMBULATING 10 - 20 FT
PAIN LOCATION - PAIN QUALITY: ACHY
DIARRHEA: 1

## 2024-04-19 NOTE — PROGRESS NOTES
Name: Fidelia Marrufo  YOB: 1945  Gender: female  MRN: [id number]    Chief complaint: Neck pain, worsening postural complaints    History of present illness:    This is a very pleasant 78 y.o. old female who presents with complaints of neck pain.  I saw patient back in October 2023.  At the time she had multiple issues going on.  She had peripheral neuropathy was under the care of neurology, questionable Parkinson's, difficulty with gait and weakness not being able to feel her feet along with scoliosis spinal stenosis and chronic back pain.  She also had a history of cervical stenosis.  She is on Xarelto.  She needs a left total knee and a right total knee arthroplasty.  She was having issues with A-fib and was scheduled to undergo a pacemaker when I last saw her.  She subsequently has had this placed.  She was placed on Sinemet previously sounds as though there is some issues with nausea and questionable whether there is consistency of taking this or trialing it.  But now patient presents to me with complaints of neck pain.  Her posture has completely changed as I saw her.  She is wheelchair-bound.  She has multiple falls.  Her neck is almost held at a 90 degree angle forward.  She states that it hurts too much to hold her head up.  But now she is limping considerably with what appears to be worsening neuromuscular strength of the thoracic region.  She states that her handwriting is horrible.  Her gaze is very fixed.  She utilizes a walker at home.  She is accompanied by her daughter today.  She tell me she was not in physical therapy but I do read notes that appears that home therapy has been following her.  She is seen [facility] neurology in the past currently is under the care of [facility].  They have asked for a another neurology referral.    Thus far, efforts to address the pain have included tylenol, muscle relaxers, and physician directed home exercise program

## 2024-04-22 ENCOUNTER — CLINICAL DOCUMENTATION (OUTPATIENT)
Dept: ORTHOPEDIC SURGERY | Age: 79
End: 2024-04-22

## 2024-04-24 ENCOUNTER — OFFICE VISIT (OUTPATIENT)
Dept: ORTHOPEDIC SURGERY | Age: 79
End: 2024-04-24
Payer: MEDICARE

## 2024-04-24 DIAGNOSIS — M17.11 PRIMARY OSTEOARTHRITIS OF RIGHT KNEE: Primary | ICD-10-CM

## 2024-04-24 PROCEDURE — 1123F ACP DISCUSS/DSCN MKR DOCD: CPT | Performed by: ORTHOPAEDIC SURGERY

## 2024-04-24 PROCEDURE — 99214 OFFICE O/P EST MOD 30 MIN: CPT | Performed by: ORTHOPAEDIC SURGERY

## 2024-04-24 NOTE — PROGRESS NOTES
symptomatology.    On examination of the  Right knee :ROM is 15 to 120 Knee is in valgus which corrects with varus stress to some degree, There is a mild effusion, and she does have significant tibial edema from mid tibia distally into her foot and ankle which is not pitting.  She has significant skin changes bilaterally but no actual breakdown. . On examination of the  Left knee :ROM is 15 to 120 There is a well-healed midline incision with appropriate range of motion and balance.    Patient is neurologically intact distally. Skin is intact.       Imaging:   I did review her x-rays from previous visits which include an AP lateral sunrise and skiers of the right knee which demonstrate severe joint space narrowing and significant osteopenia/osteoporosis.    Radiographic impression: Severe disease right knee    Assessment:   Degenerative joint disease of the right Knee.  This is relatively severe and the patient has become a relatively limited ambulator related to multiple medical and other comorbidities.  Her back and neck are issues.  She has relatively severe valgus deformity but planovalgus forming of both feet.  She is declining medically and has possibly new onset movement disorder which is contributing to this.    I did discuss with the patient the source of the pain and treatment options.  We discussed nonsurgical measures including:Activity Modification and Use of assistive device.      We also discussed the definitive option of total Knee arthroplasty.  I counseled the patient regarding the nature of the procedure the preoperative preparation necessary postop recovery and expected outcome.    I counseled them regarding the risks of surgery including risk of infection, DVT formation, loss of motion, dislocation and stiffness.    This patient has significant factors which may increase their surgical risk which include:, History of significant lumbar spinal issues which may complicate recovery and increased

## 2024-04-25 ENCOUNTER — HOME CARE VISIT (OUTPATIENT)
Dept: HOME HEALTH SERVICES | Facility: HOME HEALTH | Age: 79
End: 2024-04-25
Payer: MEDICARE

## 2024-04-26 ENCOUNTER — HOME CARE VISIT (OUTPATIENT)
Dept: SCHEDULING | Facility: HOME HEALTH | Age: 79
End: 2024-04-26
Payer: MEDICARE

## 2024-04-26 PROCEDURE — G0157 HHC PT ASSISTANT EA 15: HCPCS

## 2024-04-27 VITALS
RESPIRATION RATE: 17 BRPM | TEMPERATURE: 97.3 F | SYSTOLIC BLOOD PRESSURE: 130 MMHG | DIASTOLIC BLOOD PRESSURE: 78 MMHG | HEART RATE: 67 BPM | OXYGEN SATURATION: 94 %

## 2024-04-30 ENCOUNTER — HOME CARE VISIT (OUTPATIENT)
Dept: SCHEDULING | Facility: HOME HEALTH | Age: 79
End: 2024-04-30
Payer: MEDICARE

## 2024-04-30 VITALS
OXYGEN SATURATION: 99 % | TEMPERATURE: 97.3 F | SYSTOLIC BLOOD PRESSURE: 130 MMHG | RESPIRATION RATE: 18 BRPM | HEART RATE: 72 BPM | DIASTOLIC BLOOD PRESSURE: 86 MMHG

## 2024-04-30 PROCEDURE — G0157 HHC PT ASSISTANT EA 15: HCPCS

## 2024-04-30 ASSESSMENT — ENCOUNTER SYMPTOMS: PAIN LOCATION - PAIN QUALITY: SORE.THROB

## 2024-05-01 NOTE — CASE COMMUNICATION
Carbidopa levodopa  tablet. Take ! tablet by mouth 3 times a day . started on 4/29/24 . patient reports taking half a dose 2 times a day.  patient states will take 2 doses for one week then will increase

## 2024-05-02 ENCOUNTER — HOME CARE VISIT (OUTPATIENT)
Dept: SCHEDULING | Facility: HOME HEALTH | Age: 79
End: 2024-05-02
Payer: MEDICARE

## 2024-05-02 VITALS
OXYGEN SATURATION: 95 % | SYSTOLIC BLOOD PRESSURE: 130 MMHG | RESPIRATION RATE: 16 BRPM | HEART RATE: 63 BPM | DIASTOLIC BLOOD PRESSURE: 78 MMHG | TEMPERATURE: 97.4 F

## 2024-05-02 PROCEDURE — G0157 HHC PT ASSISTANT EA 15: HCPCS

## 2024-05-06 ENCOUNTER — HOME CARE VISIT (OUTPATIENT)
Dept: HOME HEALTH SERVICES | Facility: HOME HEALTH | Age: 79
End: 2024-05-06
Payer: MEDICARE

## 2024-05-07 ENCOUNTER — HOME CARE VISIT (OUTPATIENT)
Dept: SCHEDULING | Facility: HOME HEALTH | Age: 79
End: 2024-05-07
Payer: MEDICARE

## 2024-05-07 NOTE — CASE COMMUNICATION
Missed physical therapy visit on 5/7/24 due to patient forgetting has a doctor appointment. Patient requests only one visit this week on Thursday due to doctor appointments on Tuesday Wednesday and Friday. doctor notifeid via email

## 2024-05-09 ENCOUNTER — HOME CARE VISIT (OUTPATIENT)
Dept: HOME HEALTH SERVICES | Facility: HOME HEALTH | Age: 79
End: 2024-05-09
Payer: MEDICARE

## 2024-05-09 ENCOUNTER — HOME CARE VISIT (OUTPATIENT)
Dept: SCHEDULING | Facility: HOME HEALTH | Age: 79
End: 2024-05-09
Payer: MEDICARE

## 2024-05-09 PROCEDURE — G0157 HHC PT ASSISTANT EA 15: HCPCS

## 2024-05-10 VITALS
SYSTOLIC BLOOD PRESSURE: 128 MMHG | OXYGEN SATURATION: 97 % | RESPIRATION RATE: 16 BRPM | HEART RATE: 61 BPM | DIASTOLIC BLOOD PRESSURE: 78 MMHG | TEMPERATURE: 97 F

## 2024-05-10 NOTE — CASE COMMUNICATION
Medicare non-coverage form signed on 5/9/24. Planned discharge date on form: 5/14/24.. Patient hopes therapy can continue. states feeling better since started taking parkinsons meds. taking parkinsons meds 1/2 dose 2 times a day started on 5/1/24

## 2024-05-14 ENCOUNTER — HOME CARE VISIT (OUTPATIENT)
Dept: SCHEDULING | Facility: HOME HEALTH | Age: 79
End: 2024-05-14
Payer: MEDICARE

## 2024-05-14 VITALS
DIASTOLIC BLOOD PRESSURE: 78 MMHG | SYSTOLIC BLOOD PRESSURE: 124 MMHG | OXYGEN SATURATION: 100 % | RESPIRATION RATE: 16 BRPM | TEMPERATURE: 97 F | HEART RATE: 62 BPM

## 2024-05-14 PROCEDURE — G0157 HHC PT ASSISTANT EA 15: HCPCS

## 2024-05-14 ASSESSMENT — ENCOUNTER SYMPTOMS: PAIN LOCATION - PAIN QUALITY: ACHES.SORE

## 2024-05-14 NOTE — CASE COMMUNICATION
discussed discharge plans with caregiver and patient. both hope homecare physical therapy can continue since patient is doing better with parkinsons meds. now taking 1 dose 2 times a day

## 2024-05-16 ENCOUNTER — HOME CARE VISIT (OUTPATIENT)
Dept: SCHEDULING | Facility: HOME HEALTH | Age: 79
End: 2024-05-16
Payer: MEDICARE

## 2024-05-16 VITALS
SYSTOLIC BLOOD PRESSURE: 136 MMHG | HEART RATE: 70 BPM | DIASTOLIC BLOOD PRESSURE: 82 MMHG | RESPIRATION RATE: 18 BRPM | OXYGEN SATURATION: 96 % | TEMPERATURE: 98.4 F

## 2024-05-16 PROCEDURE — G0151 HHCP-SERV OF PT,EA 15 MIN: HCPCS

## 2024-05-16 ASSESSMENT — ENCOUNTER SYMPTOMS
CONSTIPATION: 1
DYSPNEA ACTIVITY LEVEL: AFTER AMBULATING MORE THAN 20 FT

## 2024-05-21 ENCOUNTER — HOME CARE VISIT (OUTPATIENT)
Dept: SCHEDULING | Facility: HOME HEALTH | Age: 79
End: 2024-05-21
Payer: MEDICARE

## 2024-05-21 VITALS
RESPIRATION RATE: 17 BRPM | HEART RATE: 62 BPM | TEMPERATURE: 97.1 F | SYSTOLIC BLOOD PRESSURE: 124 MMHG | OXYGEN SATURATION: 94 % | DIASTOLIC BLOOD PRESSURE: 68 MMHG

## 2024-05-21 PROCEDURE — G0157 HHC PT ASSISTANT EA 15: HCPCS

## 2024-05-21 ASSESSMENT — ENCOUNTER SYMPTOMS: PAIN LOCATION - PAIN QUALITY: ACHES.SORE

## 2024-05-24 ENCOUNTER — HOME CARE VISIT (OUTPATIENT)
Dept: SCHEDULING | Facility: HOME HEALTH | Age: 79
End: 2024-05-24
Payer: MEDICARE

## 2024-05-24 VITALS
OXYGEN SATURATION: 96 % | SYSTOLIC BLOOD PRESSURE: 132 MMHG | HEART RATE: 66 BPM | TEMPERATURE: 97.7 F | DIASTOLIC BLOOD PRESSURE: 82 MMHG | RESPIRATION RATE: 18 BRPM

## 2024-05-24 PROCEDURE — G0151 HHCP-SERV OF PT,EA 15 MIN: HCPCS

## 2024-05-29 ENCOUNTER — HOME CARE VISIT (OUTPATIENT)
Dept: SCHEDULING | Facility: HOME HEALTH | Age: 79
End: 2024-05-29
Payer: MEDICARE

## 2024-05-29 VITALS
SYSTOLIC BLOOD PRESSURE: 124 MMHG | HEART RATE: 72 BPM | RESPIRATION RATE: 16 BRPM | TEMPERATURE: 97.9 F | OXYGEN SATURATION: 93 % | DIASTOLIC BLOOD PRESSURE: 82 MMHG

## 2024-05-29 PROCEDURE — G0157 HHC PT ASSISTANT EA 15: HCPCS

## 2024-05-29 ASSESSMENT — ENCOUNTER SYMPTOMS: PAIN LOCATION - PAIN QUALITY: ACHES

## 2024-05-30 RX ORDER — DILTIAZEM HYDROCHLORIDE 360 MG/1
CAPSULE, EXTENDED RELEASE ORAL
COMMUNITY
Start: 2024-03-21

## 2024-05-30 RX ORDER — PROMETHAZINE HYDROCHLORIDE 12.5 MG/1
TABLET ORAL
COMMUNITY
Start: 2024-04-23

## 2024-05-31 ENCOUNTER — HOME CARE VISIT (OUTPATIENT)
Dept: SCHEDULING | Facility: HOME HEALTH | Age: 79
End: 2024-05-31
Payer: MEDICARE

## 2024-05-31 PROCEDURE — G0157 HHC PT ASSISTANT EA 15: HCPCS

## 2024-06-01 VITALS
OXYGEN SATURATION: 93 % | TEMPERATURE: 97 F | RESPIRATION RATE: 16 BRPM | DIASTOLIC BLOOD PRESSURE: 72 MMHG | HEART RATE: 60 BPM | SYSTOLIC BLOOD PRESSURE: 122 MMHG

## 2024-06-03 ENCOUNTER — OFFICE VISIT (OUTPATIENT)
Age: 79
End: 2024-06-03
Payer: MEDICARE

## 2024-06-03 DIAGNOSIS — M47.812 CERVICAL SPONDYLOSIS WITHOUT MYELOPATHY: Primary | ICD-10-CM

## 2024-06-03 PROCEDURE — 99204 OFFICE O/P NEW MOD 45 MIN: CPT | Performed by: ANESTHESIOLOGY

## 2024-06-03 PROCEDURE — 1123F ACP DISCUSS/DSCN MKR DOCD: CPT | Performed by: ANESTHESIOLOGY

## 2024-06-03 NOTE — PROGRESS NOTES
Previous interventions:  Procedure Date Results   ***                                     Previous medication trials: Listed:  Class Medication Results   NSAIDs ***    Oral steroids     Tylenol 500MG Q4H    Antiepileptics     Antidepressants     Relaxants     Topicals/transdermaI patches     Narcotics       Previous tests/studies:  Study Date Results   ***                                Previous therapies:  Type of Therapy Date Results   ***                                       
file     Social Determinants of Health     Financial Resource Strain: Not on file   Food Insecurity: Not on file   Transportation Needs: No Transportation Needs (9/28/2023)    OASIS : Transportation     Lack of Transportation (Medical): No     Lack of Transportation (Non-Medical): No     Patient Unable or Declines to Respond: No   Physical Activity: Not on file   Stress: Not on file   Social Connections: Feeling Socially Integrated (9/28/2023)    OASIS : Social Isolation     Frequency of experiencing loneliness or isolation: Rarely   Intimate Partner Violence: Not on file   Housing Stability: Not on file            Allergies   Allergen Reactions    Betamethasone Anxiety     Jittery  Jittery    Hydrochlorothiazide Other (See Comments)     Interaction with Tikosyn    Sertraline Shortness Of Breath    Albuterol Other (See Comments)     tachycardia/palpitations    Cetirizine-Pseudoephedrine Er Other (See Comments)     Interaction with Tikosyn    Ciprofloxacin Other (See Comments)     Interaction with Tikosyn    Cortisone Other (See Comments)     Extreme redness to face    Desvenlafaxine Other (See Comments)     Affected her mental abilities    Diphenhydramine Other (See Comments)     'I get red in the face\"    Duloxetine Other (See Comments)     FOGGY    Escitalopram Nausea Only    Fexofenadine-Pseudoephed Er Other (See Comments)    Ramelteon Other (See Comments)     SPACEY    Sulfa Antibiotics Other (See Comments)     Interaction with Tikosyn    Venlafaxine Nausea Only           Outpatient Encounter Medications as of 6/3/2024   Medication Sig Dispense Refill    betamethasone valerate (VALISONE) 0.1 % cream 1 APPLICATION TO AFFECTED AREA EXTERNALLY TWICE A DAY AS NEEDED FOR DERMATITIS      dilTIAZem (CARDIZEM CD) 360 MG extended release capsule TAKE 1 CAPSULE (360 MG) BY MOUTH ONCE DAILY AT 2:00 PM      promethazine (PHENERGAN) 12.5 MG tablet       carbidopa-levodopa (SINEMET)  MG per tablet Take 1

## 2024-06-07 ENCOUNTER — HOME CARE VISIT (OUTPATIENT)
Dept: SCHEDULING | Facility: HOME HEALTH | Age: 79
End: 2024-06-07
Payer: MEDICARE

## 2024-06-07 PROCEDURE — G0157 HHC PT ASSISTANT EA 15: HCPCS

## 2024-06-08 VITALS
TEMPERATURE: 97.1 F | HEART RATE: 60 BPM | SYSTOLIC BLOOD PRESSURE: 122 MMHG | DIASTOLIC BLOOD PRESSURE: 70 MMHG | OXYGEN SATURATION: 96 % | RESPIRATION RATE: 16 BRPM

## 2024-06-10 ENCOUNTER — TELEPHONE (OUTPATIENT)
Age: 79
End: 2024-06-10

## 2024-06-10 NOTE — TELEPHONE ENCOUNTER
HUBERTVM to inform pt that her cardiologist approved Xarelto hold for Right cervical mbb #1 scheduled for Thursday 7/11/24. Requested CB to explain when to stop medication, which would be Monday 7/8/24.    Spoke to pt, explained medication hold and answered procedure questions. Pt understands and agrees.

## 2024-06-12 ENCOUNTER — HOME CARE VISIT (OUTPATIENT)
Dept: SCHEDULING | Facility: HOME HEALTH | Age: 79
End: 2024-06-12
Payer: MEDICARE

## 2024-06-12 VITALS
OXYGEN SATURATION: 93 % | DIASTOLIC BLOOD PRESSURE: 68 MMHG | SYSTOLIC BLOOD PRESSURE: 122 MMHG | RESPIRATION RATE: 16 BRPM | HEART RATE: 60 BPM | TEMPERATURE: 97 F

## 2024-06-12 PROCEDURE — G0157 HHC PT ASSISTANT EA 15: HCPCS

## 2024-06-12 ASSESSMENT — ENCOUNTER SYMPTOMS: PAIN LOCATION - PAIN QUALITY: SORE.STIFF

## 2024-06-14 ENCOUNTER — HOME CARE VISIT (OUTPATIENT)
Dept: SCHEDULING | Facility: HOME HEALTH | Age: 79
End: 2024-06-14
Payer: MEDICARE

## 2024-06-14 VITALS
RESPIRATION RATE: 16 BRPM | HEART RATE: 64 BPM | DIASTOLIC BLOOD PRESSURE: 66 MMHG | SYSTOLIC BLOOD PRESSURE: 122 MMHG | OXYGEN SATURATION: 96 % | TEMPERATURE: 97 F

## 2024-06-14 PROCEDURE — G0157 HHC PT ASSISTANT EA 15: HCPCS

## 2024-06-18 ENCOUNTER — HOME CARE VISIT (OUTPATIENT)
Dept: SCHEDULING | Facility: HOME HEALTH | Age: 79
End: 2024-06-18
Payer: MEDICARE

## 2024-06-18 VITALS
DIASTOLIC BLOOD PRESSURE: 86 MMHG | HEART RATE: 60 BPM | OXYGEN SATURATION: 94 % | TEMPERATURE: 98 F | RESPIRATION RATE: 16 BRPM | SYSTOLIC BLOOD PRESSURE: 138 MMHG

## 2024-06-18 PROCEDURE — G0151 HHCP-SERV OF PT,EA 15 MIN: HCPCS

## 2024-06-18 ASSESSMENT — ENCOUNTER SYMPTOMS: PAIN LOCATION - PAIN QUALITY: STRAIN

## 2024-06-20 ENCOUNTER — HOME CARE VISIT (OUTPATIENT)
Dept: SCHEDULING | Facility: HOME HEALTH | Age: 79
End: 2024-06-20
Payer: MEDICARE

## 2024-06-20 VITALS
HEART RATE: 60 BPM | OXYGEN SATURATION: 98 % | SYSTOLIC BLOOD PRESSURE: 132 MMHG | RESPIRATION RATE: 18 BRPM | DIASTOLIC BLOOD PRESSURE: 82 MMHG | TEMPERATURE: 98.5 F

## 2024-06-20 PROCEDURE — G0151 HHCP-SERV OF PT,EA 15 MIN: HCPCS

## 2024-06-20 ASSESSMENT — ENCOUNTER SYMPTOMS
DYSPNEA ACTIVITY LEVEL: AFTER AMBULATING MORE THAN 20 FT
PAIN LOCATION - PAIN QUALITY: STIFF

## 2024-06-26 ENCOUNTER — HOME CARE VISIT (OUTPATIENT)
Dept: SCHEDULING | Facility: HOME HEALTH | Age: 79
End: 2024-06-26
Payer: MEDICARE

## 2024-06-28 ENCOUNTER — HOME CARE VISIT (OUTPATIENT)
Dept: SCHEDULING | Facility: HOME HEALTH | Age: 79
End: 2024-06-28
Payer: MEDICARE

## 2024-06-28 VITALS
OXYGEN SATURATION: 96 % | RESPIRATION RATE: 16 BRPM | HEART RATE: 60 BPM | SYSTOLIC BLOOD PRESSURE: 136 MMHG | DIASTOLIC BLOOD PRESSURE: 80 MMHG | TEMPERATURE: 97 F

## 2024-06-28 PROCEDURE — G0157 HHC PT ASSISTANT EA 15: HCPCS

## 2024-07-03 ENCOUNTER — HOME CARE VISIT (OUTPATIENT)
Dept: SCHEDULING | Facility: HOME HEALTH | Age: 79
End: 2024-07-03
Payer: MEDICARE

## 2024-07-03 VITALS
DIASTOLIC BLOOD PRESSURE: 78 MMHG | OXYGEN SATURATION: 97 % | SYSTOLIC BLOOD PRESSURE: 138 MMHG | RESPIRATION RATE: 16 BRPM | TEMPERATURE: 97 F | HEART RATE: 76 BPM

## 2024-07-03 PROCEDURE — G0157 HHC PT ASSISTANT EA 15: HCPCS

## 2024-07-03 ASSESSMENT — ENCOUNTER SYMPTOMS: PAIN LOCATION - PAIN QUALITY: SORE.STIFF

## 2024-07-05 ENCOUNTER — HOME CARE VISIT (OUTPATIENT)
Dept: SCHEDULING | Facility: HOME HEALTH | Age: 79
End: 2024-07-05
Payer: MEDICARE

## 2024-07-05 PROCEDURE — G0157 HHC PT ASSISTANT EA 15: HCPCS

## 2024-07-06 VITALS
HEART RATE: 60 BPM | OXYGEN SATURATION: 95 % | RESPIRATION RATE: 16 BRPM | TEMPERATURE: 97.6 F | DIASTOLIC BLOOD PRESSURE: 80 MMHG | SYSTOLIC BLOOD PRESSURE: 124 MMHG

## 2024-07-09 ENCOUNTER — HOME CARE VISIT (OUTPATIENT)
Dept: SCHEDULING | Facility: HOME HEALTH | Age: 79
End: 2024-07-09
Payer: MEDICARE

## 2024-07-09 PROCEDURE — G0157 HHC PT ASSISTANT EA 15: HCPCS

## 2024-07-10 VITALS
DIASTOLIC BLOOD PRESSURE: 78 MMHG | OXYGEN SATURATION: 99 % | TEMPERATURE: 97.7 F | RESPIRATION RATE: 16 BRPM | SYSTOLIC BLOOD PRESSURE: 132 MMHG | HEART RATE: 60 BPM

## 2024-07-12 ENCOUNTER — HOME CARE VISIT (OUTPATIENT)
Dept: SCHEDULING | Facility: HOME HEALTH | Age: 79
End: 2024-07-12
Payer: MEDICARE

## 2024-07-12 PROCEDURE — G0157 HHC PT ASSISTANT EA 15: HCPCS

## 2024-07-13 ENCOUNTER — HOME CARE VISIT (OUTPATIENT)
Dept: HOME HEALTH SERVICES | Facility: HOME HEALTH | Age: 79
End: 2024-07-13
Payer: MEDICARE

## 2024-07-13 VITALS
OXYGEN SATURATION: 98 % | SYSTOLIC BLOOD PRESSURE: 120 MMHG | RESPIRATION RATE: 16 BRPM | TEMPERATURE: 97.3 F | HEART RATE: 60 BPM | DIASTOLIC BLOOD PRESSURE: 70 MMHG

## 2024-07-13 ASSESSMENT — ENCOUNTER SYMPTOMS: PAIN LOCATION - PAIN QUALITY: ACHES

## 2024-07-17 ENCOUNTER — HOME CARE VISIT (OUTPATIENT)
Dept: SCHEDULING | Facility: HOME HEALTH | Age: 79
End: 2024-07-17
Payer: MEDICARE

## 2024-07-17 VITALS
OXYGEN SATURATION: 95 % | DIASTOLIC BLOOD PRESSURE: 72 MMHG | SYSTOLIC BLOOD PRESSURE: 122 MMHG | TEMPERATURE: 97 F | RESPIRATION RATE: 16 BRPM | HEART RATE: 60 BPM

## 2024-07-17 PROCEDURE — G0157 HHC PT ASSISTANT EA 15: HCPCS

## 2024-07-17 ASSESSMENT — ENCOUNTER SYMPTOMS: PAIN LOCATION - PAIN QUALITY: SORE. ACHES

## 2024-07-19 ENCOUNTER — HOME CARE VISIT (OUTPATIENT)
Dept: SCHEDULING | Facility: HOME HEALTH | Age: 79
End: 2024-07-19
Payer: MEDICARE

## 2024-07-19 VITALS
SYSTOLIC BLOOD PRESSURE: 132 MMHG | DIASTOLIC BLOOD PRESSURE: 72 MMHG | TEMPERATURE: 97.3 F | HEART RATE: 60 BPM | RESPIRATION RATE: 18 BRPM | OXYGEN SATURATION: 95 %

## 2024-07-19 PROCEDURE — G0151 HHCP-SERV OF PT,EA 15 MIN: HCPCS

## 2024-07-19 ASSESSMENT — ENCOUNTER SYMPTOMS
PAIN LOCATION - PAIN QUALITY: HURTS
DYSPNEA ACTIVITY LEVEL: AFTER AMBULATING MORE THAN 20 FT

## 2024-07-25 ENCOUNTER — TRANSCRIBE ORDERS (OUTPATIENT)
Dept: SCHEDULING | Age: 79
End: 2024-07-25

## 2024-07-25 DIAGNOSIS — Z12.31 SCREENING MAMMOGRAM FOR HIGH-RISK PATIENT: Primary | ICD-10-CM

## 2024-10-14 ENCOUNTER — TELEPHONE (OUTPATIENT)
Age: 79
End: 2024-10-14

## 2024-11-13 ENCOUNTER — OFFICE VISIT (OUTPATIENT)
Dept: ORTHOPEDIC SURGERY | Age: 79
End: 2024-11-13
Payer: MEDICARE

## 2024-11-13 DIAGNOSIS — M54.50 CHRONIC BILATERAL LOW BACK PAIN, UNSPECIFIED WHETHER SCIATICA PRESENT: ICD-10-CM

## 2024-11-13 DIAGNOSIS — G89.29 CHRONIC BILATERAL LOW BACK PAIN, UNSPECIFIED WHETHER SCIATICA PRESENT: ICD-10-CM

## 2024-11-13 DIAGNOSIS — M17.11 PRIMARY OSTEOARTHRITIS OF RIGHT KNEE: Primary | ICD-10-CM

## 2024-11-13 PROCEDURE — 99215 OFFICE O/P EST HI 40 MIN: CPT | Performed by: ORTHOPAEDIC SURGERY

## 2024-11-13 PROCEDURE — 1123F ACP DISCUSS/DSCN MKR DOCD: CPT | Performed by: ORTHOPAEDIC SURGERY

## 2024-11-13 NOTE — PROGRESS NOTES
Name: Fidelia Marrufo  YOB: 1945  Gender: female  MRN: [id number]    CC: Right knee pain    HPI: Fidelia Marrufo is a 79 y.o. female who presents in follow-up for her longstanding severe DJD of the right knee.  We have discussed knee replacement and has they have had her on the schedule several times since prior to COVID and following COVID but she has been forced to cancel because of various issues.  These have been related to the COVID pandemic but also to significant medical concerns on her part.  Early last year she was on the schedule for surgery but she was found to have significant cardiac disease and required cardiac evaluation and a pacemaker placement.  She had an admission for hypokalemia around that same time.  She also has significant problems with peripheral edema all of which made surgery at the time contraindicated.    Since then her cardiac circumstances have certainly considerably improved followed by [facility] cardiology following her cardiac pacemaker placement.  She has seen outpatient physical therapy and worked on edema control and her fluid problem in combination of management of her heart and her electrolytes with PT has improved considerably.    Unfortunately her knee situation is worsened.  She has at times been able to ambulate with a walker or cane but is having episodes where she simply cannot ambulate to speak of is becoming more more dependent on a wheelchair for community ambulation.    She does have a history with a lower back and is known to have significant cervicothoracic lumbar degenerative disc disease which limits her posture but does not recreate a great deal direct pain.    With declining function and stabilization of significant chronic medical condition she comes in today for further recommendations and treatment.    History was obtained from patient and her son accompanies her    ROS/Meds/PSH/PMH/FH/SH: I personally reviewed the patients standard

## 2024-11-14 ENCOUNTER — TELEPHONE (OUTPATIENT)
Dept: ORTHOPEDIC SURGERY | Age: 79
End: 2024-11-14

## 2024-11-14 NOTE — TELEPHONE ENCOUNTER
Pt  was seen  on  11/13  and  offered  a cortisone injection    She  would like to  come on  wed 11/20  at [facility]     Please  call

## 2024-11-20 ENCOUNTER — OFFICE VISIT (OUTPATIENT)
Dept: ORTHOPEDIC SURGERY | Age: 79
End: 2024-11-20
Payer: MEDICARE

## 2024-11-20 DIAGNOSIS — M17.11 PRIMARY OSTEOARTHRITIS OF RIGHT KNEE: Primary | ICD-10-CM

## 2024-11-20 PROCEDURE — 20611 DRAIN/INJ JOINT/BURSA W/US: CPT | Performed by: PHYSICIAN ASSISTANT

## 2024-11-20 PROCEDURE — L1820 KO ELAS W/ CONDYLE PADS & JO: HCPCS | Performed by: ORTHOPAEDIC SURGERY

## 2024-11-20 RX ORDER — METHYLPREDNISOLONE ACETATE 40 MG/ML
40 INJECTION, SUSPENSION INTRA-ARTICULAR; INTRALESIONAL; INTRAMUSCULAR; SOFT TISSUE ONCE
Status: COMPLETED | OUTPATIENT
Start: 2024-11-20 | End: 2024-11-20

## 2024-11-20 RX ADMIN — METHYLPREDNISOLONE ACETATE 40 MG: 40 INJECTION, SUSPENSION INTRA-ARTICULAR; INTRALESIONAL; INTRAMUSCULAR; SOFT TISSUE at 14:26

## 2024-11-20 NOTE — PROGRESS NOTES
Reddie Hinge brace for patients right knee. I explained how the hinge on each side of the brace should line up with the patella. The patient was also instructed to tighten the strap distal to the patella first to insure the brace is anchored and prevents slipping, , then the top strap should be tightened.    Patient read and signed documenting they understand and agree to [facility]'s current DME return policy.

## 2024-11-20 NOTE — PROGRESS NOTES
Name: Fidelia Marrufo  YOB: 1945  Gender: female  MRN: [id number]        Current Outpatient Medications:     betamethasone valerate (VALISONE) 0.1 % cream, 1 APPLICATION TO AFFECTED AREA EXTERNALLY TWICE A DAY AS NEEDED FOR DERMATITIS, Disp: , Rfl:     dilTIAZem (CARDIZEM CD) 360 MG extended release capsule, TAKE 1 CAPSULE (360 MG) BY MOUTH ONCE DAILY AT 2:00 PM, Disp: , Rfl:     promethazine (PHENERGAN) 12.5 MG tablet, , Disp: , Rfl:     carbidopa-levodopa (SINEMET)  MG per tablet, Take 1 tablet by mouth in the morning and 1 tablet in the evening., Disp: , Rfl:     metoprolol succinate (TOPROL XL) 25 MG extended release tablet, Take 0.5 tablets by mouth daily as needed, Disp: , Rfl:     prochlorperazine (COMPAZINE) 5 MG tablet, TAKE 1-2 TABLETS BY MOUTH 3 (THREE) TIMES A DAY AS NEEDED FOR NAUSEA FOR UP TO 10 DAYS, Disp: , Rfl:     ondansetron (ZOFRAN-ODT) 4 MG disintegrating tablet, TAKE 1 TABLET (4 MG) BY MOUTH 3 (THREE) TIMES A DAY AS NEEDED FOR NAUSEA OR VOMITING, Disp: , Rfl:     flecainide (TAMBOCOR) 100 MG tablet, Take 1 tablet by mouth 2 times daily, Disp: , Rfl:     potassium chloride (K-TAB) 20 MEQ TBCR extended release tablet, Take 1 tablet by mouth daily, Disp: , Rfl:     busPIRone (BUSPAR) 10 MG tablet, Take 5 mg by mouth in the morning and 5 mg in the evening. take 1/2 tablet (5 mg) by mouth in the Am and 1/2 tablet (5 mg) by mouth in the evening. (Patient not taking: Reported on 4/16/2024), Disp: , Rfl:     vitamin E 400 UNIT capsule, Take 1 capsule by mouth daily, Disp: , Rfl:     esomeprazole Magnesium (NEXIUM) 20 MG PACK, Take 40 mg by mouth daily. (Patient not taking: Reported on 4/16/2024), Disp: , Rfl:     vitamin D (CHOLECALCIFEROL) 25 MCG (1000 UT) TABS tablet, Take 1 tablet by mouth daily, Disp: , Rfl:     Ergocalciferol (VITAMIN D2 PO), Take 1.25 mg by mouth daily., Disp: , Rfl:     lisinopril (PRINIVIL;ZESTRIL) 5 MG tablet, Take 1 tablet by mouth daily, Disp: ,

## 2025-02-19 ENCOUNTER — OFFICE VISIT (OUTPATIENT)
Dept: ORTHOPEDIC SURGERY | Age: 80
End: 2025-02-19
Payer: MEDICARE

## 2025-02-19 DIAGNOSIS — M17.11 PRIMARY OSTEOARTHRITIS OF RIGHT KNEE: Primary | ICD-10-CM

## 2025-02-19 PROCEDURE — 1123F ACP DISCUSS/DSCN MKR DOCD: CPT | Performed by: PHYSICIAN ASSISTANT

## 2025-02-19 PROCEDURE — 20611 DRAIN/INJ JOINT/BURSA W/US: CPT | Performed by: PHYSICIAN ASSISTANT

## 2025-02-19 PROCEDURE — 99214 OFFICE O/P EST MOD 30 MIN: CPT | Performed by: PHYSICIAN ASSISTANT

## 2025-02-19 PROCEDURE — 96372 THER/PROPH/DIAG INJ SC/IM: CPT | Performed by: PHYSICIAN ASSISTANT

## 2025-02-19 RX ORDER — METHYLPREDNISOLONE ACETATE 40 MG/ML
40 INJECTION, SUSPENSION INTRA-ARTICULAR; INTRALESIONAL; INTRAMUSCULAR; SOFT TISSUE ONCE
Status: COMPLETED | OUTPATIENT
Start: 2025-02-19 | End: 2025-02-19

## 2025-02-19 RX ADMIN — METHYLPREDNISOLONE ACETATE 40 MG: 40 INJECTION, SUSPENSION INTRA-ARTICULAR; INTRALESIONAL; INTRAMUSCULAR; SOFT TISSUE at 13:51

## 2025-02-19 NOTE — PROGRESS NOTES
Name: Fidelia Marrufo  YOB: 1945  Gender: female  MRN: [id number]      Chief complaint:  RIGHT KNEE PAIN    History of Present Illness:     Fidelia Marrufo is a 79 y.o. female  She returns today for recheck on her right knee.  She was last evaluated by Dr. Coronel in November where ultimately her Dr. Coronel did decide to proceed with an elective right TKA.  Today she states the pain still intermittent in the right knee.  Also notes that her gait has become more difficult because of her Parkinson's disease.  Her back is also an underlying factor and states that she is also having difficulty walking because of her back.  The patient presents in a wheelchair and is accompanied by her daughter again today.  At this time she is considering not having the surgery due to her numerous health concerns, including her gait due to Parkinson's and her back.    Past Medical History:    Allergies:  Allergies   Allergen Reactions    Betamethasone Anxiety     Jittery  Jittery    Hydrochlorothiazide Other (See Comments)     Interaction with Tikosyn    Sertraline Shortness Of Breath    Albuterol Other (See Comments)     tachycardia/palpitations    Cetirizine-Pseudoephedrine Er Other (See Comments)     Interaction with Tikosyn    Ciprofloxacin Other (See Comments)     Interaction with Tikosyn    Cortisone Other (See Comments)     Extreme redness to face    Desvenlafaxine Other (See Comments)     Affected her mental abilities    Diphenhydramine Other (See Comments)     'I get red in the face\"    Duloxetine Other (See Comments)     FOGGY    Escitalopram Nausea Only    Fexofenadine-Pseudoephed Er Other (See Comments)    Ramelteon Other (See Comments)     SPACEY    Sulfa Antibiotics Other (See Comments)     Interaction with Tikosyn    Venlafaxine Nausea Only       Medications:  Current Outpatient Medications   Medication Sig    betamethasone valerate (VALISONE) 0.1 % cream 1 APPLICATION TO AFFECTED AREA EXTERNALLY

## 2025-02-20 ENCOUNTER — TELEPHONE (OUTPATIENT)
Dept: ORTHOPEDIC SURGERY | Age: 80
End: 2025-02-20

## 2025-02-20 DIAGNOSIS — M17.11 PRIMARY OSTEOARTHRITIS OF RIGHT KNEE: Primary | ICD-10-CM

## 2025-03-12 ENCOUNTER — OFFICE VISIT (OUTPATIENT)
Dept: ORTHOPEDIC SURGERY | Age: 80
End: 2025-03-12
Payer: MEDICARE

## 2025-03-12 DIAGNOSIS — M17.11 PRIMARY OSTEOARTHRITIS OF RIGHT KNEE: Primary | ICD-10-CM

## 2025-03-12 PROCEDURE — 20611 DRAIN/INJ JOINT/BURSA W/US: CPT | Performed by: PHYSICIAN ASSISTANT

## 2025-03-12 RX ORDER — HYALURONATE SODIUM 10 MG/ML
20 SYRINGE (ML) INTRAARTICULAR ONCE
Status: COMPLETED | OUTPATIENT
Start: 2025-03-12 | End: 2025-03-12

## 2025-03-12 RX ADMIN — Medication 20 MG: at 14:57

## 2025-03-12 NOTE — PROGRESS NOTES
Name: Fidelia Marrufo  YOB: 1945  Gender: female  MRN: [id number]     CC: RIGHT Knee Osteoarthritis      PROCEDURE: #1 of 3 Hyaluronic Injection    The patient's name and date of birth were confirmed prior to the injection.  The injection site was also confirmed with the patient prior to the procedure. After discussion of risks and benefits including but not limited to pain, infection, skin discoloration, and injury to blood vessels or nerves the patient verbally consented to proceed with injection of the RIGHT.  The patient is to restrict their activity for 48 hours.    Radiology Report: US guidance was used to examine the joint, ensure adequate needle placement and to decrease the risk of joint aggravation. The intracondylar notch, retropatellar fat pad, patella tendon, patella and tibia were all visualized. Pre and post injection US still images were obtained and placed in the record. Image were obtained with a [employer] ultrasound transducer (model [id number]).    Procedure Note: After steriley prepping the RIGHT knee, it was injected with a 2mL of Euflexxa and the medication was observed going into the intra-articular space via US guidance.    The patient tolerated the procedure without difficulty.    KENDELL Meza

## 2025-03-19 ENCOUNTER — OFFICE VISIT (OUTPATIENT)
Dept: ORTHOPEDIC SURGERY | Age: 80
End: 2025-03-19
Payer: MEDICARE

## 2025-03-19 DIAGNOSIS — M17.11 PRIMARY OSTEOARTHRITIS OF RIGHT KNEE: Primary | ICD-10-CM

## 2025-03-19 PROCEDURE — 20611 DRAIN/INJ JOINT/BURSA W/US: CPT | Performed by: PHYSICIAN ASSISTANT

## 2025-03-19 NOTE — PROGRESS NOTES
Name: Fidelia Marrufo  YOB: 1945  Gender: female  MRN: [id number]     CC: RIGHT Knee Osteoarthritis      PROCEDURE: #2 of 3 Hyaluronic Injection    The patient's name and date of birth were confirmed prior to the injection.  The injection site was also confirmed with the patient prior to the procedure. After discussion of risks and benefits including but not limited to pain, infection, skin discoloration, and injury to blood vessels or nerves the patient verbally consented to proceed with injection of the RIGHT.  The patient is to restrict their activity for 48 hours.    Radiology Report: US guidance was used to examine the joint, ensure adequate needle placement and to decrease the risk of joint aggravation. The intracondylar notch, retropatellar fat pad, patella tendon, patella and tibia were all visualized. Pre and post injection US still images were obtained and placed in the record. Image were obtained with a [employer] ultrasound transducer (model [id number]).    Procedure Note: After steriley prepping the RIGHT knee, it was injected with a 2mL of Euflexxa and the medication was observed going into the intra-articular space via US guidance.    The patient tolerated the procedure without difficulty.    KENDELL Meza

## 2025-03-26 ENCOUNTER — OFFICE VISIT (OUTPATIENT)
Dept: ORTHOPEDIC SURGERY | Age: 80
End: 2025-03-26
Payer: MEDICARE

## 2025-03-26 DIAGNOSIS — M17.11 PRIMARY OSTEOARTHRITIS OF RIGHT KNEE: Primary | ICD-10-CM

## 2025-03-26 PROCEDURE — 20611 DRAIN/INJ JOINT/BURSA W/US: CPT | Performed by: PHYSICIAN ASSISTANT

## 2025-03-26 NOTE — PROGRESS NOTES
Name: Fidelia Marrufo  YOB: 1945  Gender: female  MRN: [id number]     CC: RIGHT Knee Osteoarthritis      PROCEDURE: #3 of 3 Hyaluronic Injection    The patient's name and date of birth were confirmed prior to the injection.  The injection site was also confirmed with the patient prior to the procedure. After discussion of risks and benefits including but not limited to pain, infection, skin discoloration, and injury to blood vessels or nerves the patient verbally consented to proceed with injection of the RIGHT.  The patient is to restrict their activity for 48 hours.    Radiology Report: US guidance was used to examine the joint, ensure adequate needle placement and to decrease the risk of joint aggravation. The intracondylar notch, retropatellar fat pad, patella tendon, patella and tibia were all visualized. Pre and post injection US still images were obtained and placed in the record. Image were obtained with a [employer] ultrasound transducer (model [id number]).    Procedure Note: After steriley prepping the RIGHT knee, it was injected with a 2mL of Euflexxa and the medication was observed going into the intra-articular space via US guidance.    The patient tolerated the procedure without difficulty.    KENDELL Meza

## 2025-04-09 ENCOUNTER — OFFICE VISIT (OUTPATIENT)
Age: 80
End: 2025-04-09
Payer: MEDICARE

## 2025-04-09 DIAGNOSIS — M41.9 SCOLIOSIS OF LUMBAR SPINE, UNSPECIFIED SCOLIOSIS TYPE: ICD-10-CM

## 2025-04-09 DIAGNOSIS — M47.816 LUMBAR FACET ARTHROPATHY: ICD-10-CM

## 2025-04-09 DIAGNOSIS — M40.14 OTHER SECONDARY KYPHOSIS, THORACIC REGION: ICD-10-CM

## 2025-04-09 DIAGNOSIS — M50.30 DEGENERATIVE DISC DISEASE, CERVICAL: ICD-10-CM

## 2025-04-09 DIAGNOSIS — M43.16 SPONDYLOLISTHESIS OF LUMBAR REGION: ICD-10-CM

## 2025-04-09 DIAGNOSIS — M54.50 LOW BACK PAIN, UNSPECIFIED BACK PAIN LATERALITY, UNSPECIFIED CHRONICITY, UNSPECIFIED WHETHER SCIATICA PRESENT: Primary | ICD-10-CM

## 2025-04-09 PROCEDURE — 99214 OFFICE O/P EST MOD 30 MIN: CPT | Performed by: NURSE PRACTITIONER

## 2025-04-09 PROCEDURE — 1123F ACP DISCUSS/DSCN MKR DOCD: CPT | Performed by: NURSE PRACTITIONER

## 2025-04-09 RX ORDER — FLUTICASONE PROPIONATE 50 MCG
2 SPRAY, SUSPENSION (ML) NASAL DAILY
COMMUNITY
Start: 2025-02-12

## 2025-04-09 RX ORDER — LIDOCAINE 50 MG/G
1 PATCH TOPICAL DAILY
Qty: 30 PATCH | Refills: 0 | Status: SHIPPED | OUTPATIENT
Start: 2025-04-09 | End: 2025-05-09

## 2025-04-09 NOTE — PROGRESS NOTES
established with Dr. Sebastian for her cervical spine.  She has known extensive lumbar spondylosis with spondylolisthesis.  Ultimately a lot of the time she is wheelchair-bound.  She does have good deal back pain.  She looks much better from a Parkinson's standpoint.  But I think she needs aggressive interventional management.  I think she would benefit from ablations of the lower lumbar spine.  That would help her with her back pain.  She may also benefit from cervical injections.  Will go ahead and set her up with home physical therapy.  I will refer her back to Dr. Sebastian for further evaluation and treatment of both the cervical and lumbar complaints.    - Physical Therapy was prescribed and will include stretching, strengthening and modalities to promote blood flow, flexibility and core strengthening.  - Referral to pain management. The patient's care would be best managed in a formal pain management setting and will be referred accordingly.      Orders Placed This Encounter   Medications    lidocaine (LIDODERM) 5 %     Sig: Place 1 patch onto the skin daily 12 hours on, 12 hours off.     Dispense:  30 patch     Refill:  0        Orders Placed This Encounter   Procedures    XR LUMBAR SPINE (2-3 VIEWS)    [facility]    External Referral to Home Health        4 This is a chronic illness/condition with exacerbation and progression      Return for refer to pain management.     EVELYNE Britton - CNP  04/10/25      Elements of this note were created using speech recognition software.  As such, errors of speech recognition may be present.

## 2025-05-28 ENCOUNTER — OFFICE VISIT (OUTPATIENT)
Dept: ORTHOPEDIC SURGERY | Age: 80
End: 2025-05-28
Payer: MEDICARE

## 2025-05-28 DIAGNOSIS — M17.11 PRIMARY OSTEOARTHRITIS OF RIGHT KNEE: Primary | ICD-10-CM

## 2025-05-28 PROCEDURE — 20611 DRAIN/INJ JOINT/BURSA W/US: CPT | Performed by: PHYSICIAN ASSISTANT

## 2025-05-28 RX ORDER — METHYLPREDNISOLONE ACETATE 40 MG/ML
40 INJECTION, SUSPENSION INTRA-ARTICULAR; INTRALESIONAL; INTRAMUSCULAR; SOFT TISSUE ONCE
Status: COMPLETED | OUTPATIENT
Start: 2025-05-28 | End: 2025-05-28

## 2025-05-28 RX ADMIN — METHYLPREDNISOLONE ACETATE 40 MG: 40 INJECTION, SUSPENSION INTRA-ARTICULAR; INTRALESIONAL; INTRAMUSCULAR; SOFT TISSUE at 14:42

## 2025-05-28 NOTE — PROGRESS NOTES
Name: Fidelia Marrufo  YOB: 1945  Gender: female  MRN: [id number]        Current Outpatient Medications:     fluticasone (FLONASE) 50 MCG/ACT nasal spray, 2 sprays by Each Nostril route daily, Disp: , Rfl:     betamethasone valerate (VALISONE) 0.1 % cream, 1 APPLICATION TO AFFECTED AREA EXTERNALLY TWICE A DAY AS NEEDED FOR DERMATITIS, Disp: , Rfl:     dilTIAZem (CARDIZEM CD) 360 MG extended release capsule, TAKE 1 CAPSULE (360 MG) BY MOUTH ONCE DAILY AT 2:00 PM, Disp: , Rfl:     promethazine (PHENERGAN) 12.5 MG tablet, , Disp: , Rfl:     carbidopa-levodopa (SINEMET)  MG per tablet, Take 1 tablet by mouth in the morning and 1 tablet in the evening., Disp: , Rfl:     metoprolol succinate (TOPROL XL) 25 MG extended release tablet, Take 0.5 tablets by mouth daily as needed, Disp: , Rfl:     prochlorperazine (COMPAZINE) 5 MG tablet, TAKE 1-2 TABLETS BY MOUTH 3 (THREE) TIMES A DAY AS NEEDED FOR NAUSEA FOR UP TO 10 DAYS, Disp: , Rfl:     ondansetron (ZOFRAN-ODT) 4 MG disintegrating tablet, TAKE 1 TABLET (4 MG) BY MOUTH 3 (THREE) TIMES A DAY AS NEEDED FOR NAUSEA OR VOMITING (Patient not taking: Reported on 4/9/2025), Disp: , Rfl:     flecainide (TAMBOCOR) 100 MG tablet, Take 1 tablet by mouth 2 times daily, Disp: , Rfl:     potassium chloride (K-TAB) 20 MEQ TBCR extended release tablet, Take 1 tablet by mouth daily, Disp: , Rfl:     busPIRone (BUSPAR) 10 MG tablet, Take 5 mg by mouth in the morning and 5 mg in the evening. take 1/2 tablet (5 mg) by mouth in the Am and 1/2 tablet (5 mg) by mouth in the evening. (Patient not taking: Reported on 4/16/2024), Disp: , Rfl:     vitamin E 400 UNIT capsule, Take 1 capsule by mouth daily, Disp: , Rfl:     esomeprazole Magnesium (NEXIUM) 20 MG PACK, Take 2 packets by mouth daily, Disp: , Rfl:     vitamin D (CHOLECALCIFEROL) 25 MCG (1000 UT) TABS tablet, Take 1 tablet by mouth daily, Disp: , Rfl:     Ergocalciferol (VITAMIN D2 PO), Take 1.25 mg by mouth

## (undated) DEVICE — SOLUTION IV 500ML 0.9% SOD CHL FLX CONT

## (undated) DEVICE — CLOSURE SKIN FACILITATES COMPATIBILITY W/ CERTAIN IS DSG

## (undated) DEVICE — TOTAL KNEE DR RIDGEWAY: Brand: MEDLINE INDUSTRIES, INC.

## (undated) DEVICE — SYR 50ML LR LCK 1ML GRAD NSAF --

## (undated) DEVICE — SUTURE VCRL SZ 1 L27IN ABSRB UD L36MM CP-1 1/2 CIR REV CUT J268H

## (undated) DEVICE — SOLUTION IRRIG 3000ML 0.9% SOD CHL FLX CONT 0797208] ICU MEDICAL INC]

## (undated) DEVICE — SUTURE STRATAFIX SYMMETRIC PDS + SZ 1 L18IN ABSRB VLT L48MM SXPP1A400

## (undated) DEVICE — SOLUTION IV 1000ML 0.9% SOD CHL

## (undated) DEVICE — Z DISCONTINUED USE 2744636  DRESSING AQUACEL 14 IN ALG W3.5XL14IN POLYUR FLM CVR W/ HYDRCOLL

## (undated) DEVICE — STERILE PRESSURE PROTECTOR PAD® FOR DE MAYO UNIVERSAL DISTRACTOR® (10/CASE): Brand: DE MAYO UNIVERSAL DISTRACTOR®

## (undated) DEVICE — BLADE SAW PAT RMR PILT H 41MM --

## (undated) DEVICE — DRAPE,TOP,102X53,STERILE: Brand: MEDLINE

## (undated) DEVICE — CLOSURE SKIN FLX NONINVASIVE PRELOC TECHNOLOGY FOR 24IN

## (undated) DEVICE — STERILE SLEEVE: Brand: CONVERTORS